# Patient Record
Sex: FEMALE | Race: WHITE | NOT HISPANIC OR LATINO | Employment: FULL TIME | URBAN - NONMETROPOLITAN AREA
[De-identification: names, ages, dates, MRNs, and addresses within clinical notes are randomized per-mention and may not be internally consistent; named-entity substitution may affect disease eponyms.]

---

## 2020-06-02 ENCOUNTER — TRANSCRIBE ORDERS (OUTPATIENT)
Dept: ADMINISTRATIVE | Facility: HOSPITAL | Age: 35
End: 2020-06-02

## 2020-06-02 DIAGNOSIS — Z01.818 PREOP TESTING: Primary | ICD-10-CM

## 2020-06-08 ENCOUNTER — LAB (OUTPATIENT)
Dept: LAB | Facility: HOSPITAL | Age: 35
End: 2020-06-08

## 2020-06-08 PROCEDURE — U0003 INFECTIOUS AGENT DETECTION BY NUCLEIC ACID (DNA OR RNA); SEVERE ACUTE RESPIRATORY SYNDROME CORONAVIRUS 2 (SARS-COV-2) (CORONAVIRUS DISEASE [COVID-19]), AMPLIFIED PROBE TECHNIQUE, MAKING USE OF HIGH THROUGHPUT TECHNOLOGIES AS DESCRIBED BY CMS-2020-01-R: HCPCS | Performed by: OTOLARYNGOLOGY

## 2020-06-09 LAB
COVID LABCORP PRIORITY: NORMAL
SARS-COV-2 RNA RESP QL NAA+PROBE: NOT DETECTED

## 2020-06-11 ENCOUNTER — OFFICE VISIT (OUTPATIENT)
Dept: OTOLARYNGOLOGY | Facility: CLINIC | Age: 35
End: 2020-06-11

## 2020-06-11 VITALS
TEMPERATURE: 98.1 F | HEART RATE: 101 BPM | WEIGHT: 134 LBS | SYSTOLIC BLOOD PRESSURE: 133 MMHG | DIASTOLIC BLOOD PRESSURE: 87 MMHG | HEIGHT: 66 IN | BODY MASS INDEX: 21.53 KG/M2

## 2020-06-11 DIAGNOSIS — J30.9 ALLERGIC RHINITIS, UNSPECIFIED SEASONALITY, UNSPECIFIED TRIGGER: ICD-10-CM

## 2020-06-11 DIAGNOSIS — K21.9 LARYNGOPHARYNGEAL REFLUX: ICD-10-CM

## 2020-06-11 DIAGNOSIS — J31.0 CHRONIC RHINITIS: ICD-10-CM

## 2020-06-11 DIAGNOSIS — J38.5 LARYNGEAL SPASM: ICD-10-CM

## 2020-06-11 DIAGNOSIS — R49.0 MUSCLE TENSION DYSPHONIA: ICD-10-CM

## 2020-06-11 DIAGNOSIS — J37.0 CHRONIC LARYNGITIS: Primary | ICD-10-CM

## 2020-06-11 PROCEDURE — 31575 DIAGNOSTIC LARYNGOSCOPY: CPT | Performed by: OTOLARYNGOLOGY

## 2020-06-11 PROCEDURE — 99204 OFFICE O/P NEW MOD 45 MIN: CPT | Performed by: OTOLARYNGOLOGY

## 2020-06-11 RX ORDER — CHOLESTYRAMINE 4 G/9G
4 POWDER, FOR SUSPENSION ORAL DAILY
COMMUNITY
End: 2021-06-17

## 2020-06-11 RX ORDER — FLUTICASONE PROPIONATE 50 MCG
2 SPRAY, SUSPENSION (ML) NASAL DAILY
Qty: 1 BOTTLE | Refills: 6 | Status: SHIPPED | OUTPATIENT
Start: 2020-06-11 | End: 2020-07-11

## 2020-06-11 RX ORDER — GUAIFENESIN 600 MG/1
1200 TABLET, EXTENDED RELEASE ORAL EVERY 12 HOURS SCHEDULED
Qty: 60 TABLET | Refills: 3 | Status: SHIPPED | OUTPATIENT
Start: 2020-06-11 | End: 2020-07-11

## 2020-06-11 RX ORDER — OMEPRAZOLE 40 MG/1
40 CAPSULE, DELAYED RELEASE ORAL DAILY
Qty: 30 CAPSULE | Refills: 3 | Status: SHIPPED | OUTPATIENT
Start: 2020-06-11 | End: 2020-07-11

## 2020-06-11 RX ORDER — ALPRAZOLAM 0.5 MG/1
0.05 TABLET ORAL 2 TIMES DAILY PRN
COMMUNITY
End: 2021-10-14

## 2020-06-11 NOTE — PROGRESS NOTES
Indra Rubio MD     Chief Complaint   Patient presents with   • Difficulty Swallowing     vocal cord dysfunction        HPI   I have reviewed the history as documented by the MA/REY/RN Indra Rubio MD  Mago Gamboa is a 34 y.o. female has had a chronic history of vocal cord dysfunction.  She has had an episode of severe throat pain like her larynx ripped when she was singing in the car.  This caused her to have chronic irritation.  She has seen several ENTs and has had speech therapy to try to improve her chronic laryngitis complaints.  She has seen a gastroenterologist who of performed EGD and ruled out King's esophagitis.  She has been on intermittent Valium to try to decrease vocal cord spasms.  She has not had recurring spasms.  She has not had ongoing hoarseness.  She does have a chronic globus sensation and a sensation that she is not swallowing well.  She has a good water intake and decreased caffeine intake.  She has had a lot of postnasal drip and chronic nasal congestion.    Review of Systems   I have reviewed the ROS as documented by the MA/REY/RN Indra Rubio MD      Past History:  Past Medical History:   Diagnosis Date   • Tachycardia      Past Surgical History:   Procedure Laterality Date   • ADENOIDECTOMY     • APPENDECTOMY     • CARDIAC SURGERY      loop recorder   • CHOLECYSTECTOMY     • ENDOMETRIAL ABLATION     • HYSTERECTOMY     • TONSILLECTOMY       History reviewed. No pertinent family history.  Social History     Tobacco Use   • Smoking status: Never Smoker   • Smokeless tobacco: Never Used   Substance Use Topics   • Alcohol use: Yes     Frequency: Monthly or less     Drinks per session: 1 or 2     Binge frequency: Never   • Drug use: Never     Outpatient Medications Marked as Taking for the 6/11/20 encounter (Office Visit) with Indra Rubio MD   Medication Sig Dispense Refill   • ALPRAZolam (XANAX) 0.5 MG tablet Take 0.05 mg by mouth 2 (Two) Times a Day  As Needed.     • cholestyramine (Questran) 4 g packet Take 4 g by mouth Daily.       Allergies:  Iodinated diagnostic agents and Sulfa antibiotics      Vital Signs:   Temp:  [98.1 °F (36.7 °C)] 98.1 °F (36.7 °C)  Heart Rate:  [101] 101  BP: (133)/(87) 133/87    Physical Exam  CONSTITUTIONAL: well nourished, well-developed, alert, oriented, in no acute distress   COMMUNICATION AND VOICE: able to communicate normally, normal voice quality  HEAD: normocephalic, no lesions, atraumatic, no tenderness, no masses   FACE: appearance normal, no lesions, no tenderness, no deformities, facial motion symmetric  SALIVARY GLANDS: parotid glands with no tenderness, no swelling, no masses, submandibular glands with normal size, nontender  EYES: ocular motility normal, eyelids normal, orbits normal, no proptosis, conjunctiva normal , pupils equal, round  HEARING: response to conversational voice normal bilaterally   EXTERNAL EARS: auricles without lesions  EXTERNAL EAR CANALS: normal ear canals without stenosis or significant cerumen  TYMPANIC MEMBRANES: tympanic membrane appearance normal, no lesions, no perforation, normal mobility, no fluid  EXTERNAL NOSE: structure normal, no tenderness on palpation, no nasal discharge, no lesions, no evidence of trauma, nostrils patent  INTRANASAL EXAM: nasal mucosa with mucosal congestion and erythema, nasal septum without overt anterior deviation  NASOPHARYNX: nasopharyngeal mucosa, adenoids within normal limits  LIPS: structure normal, no tenderness on palpation, no lesions, no evidence of trauma  TEETH: dentition within normal limits for age  GUMS: gingivae healthy  ORAL MUCOSA: oral mucosa normal, no mucosal lesions  FLOOR OF MOUTH: Warthin's duct patent, mucosa normal  TONGUE: lingual mucosa normal without lesions, normal tongue mobility  PALATE: soft and hard palates with normal mucosa and structure  OROPHARYNX: oropharyngeal mucosa normal, tonsil fossa normal in appearance  HYPOPHARYNX:  hypopharyngeal mucosa normal  LARYNX: diffuse inflammation and thick mucous present, epiglottis and arytenoid cartilage within normal limits, vocal cord with normal mobility   NECK: neck appearance normal, no masses or tenderness  THYROID: no overt thyromegaly, no tenderness, nodules or mass present on palpation, position midline   LYMPH NODES: no lymphadenopathy  CHEST/RESPIRATORY: respiratory effort normal, normal breath sounds  CARDIOVASCULAR: rate and rhythm normal, extremities without cyanosis or edema, no overt jugulovenous distension present  NEUROLOGIC/PSYCHIATRIC: oriented appropriately for age, mood normal, affect appropriate, cranial nerves intact grossly unless specifically mentioned above     Laryngoscopy  Date/Time: 6/11/2020 3:30 PM  Performed by: Indra Rubio MD  Authorized by: Indra Rubio MD   Comments: Procedure Note    Anesthesia: topical 4% tetracaine and oxymetazoline mix    Endoscopy Type: Flexible Laryngoscopy    Indications for Procedure: Patient unable to cooperate - preventing mirror examination  Hoarseness, dysphagia or aspiration - not able to be clearly evaluated by indirect laryngoscopy    Procedure Details:    The patient was placed in the sitting position.  The 4 mm laryngoscope was passed.  The nasal cavities, nasopharynx, oropharynx, hypopharynx, and larynx were all examined.  Vocal cords were examined during respiration and phonation.  The following findings were noted:    Findings: diffuse mucosal dryness and thick mucous present, diffuse mucosal inflammation present with posterior arytenoid edema and erythema present,    Condition:  Stable.  Patient tolerated procedure well.    Complications:  None            RESULTS REVIEW:    I have reviewed the patients old records in the chart.            Assessment:    1. Chronic laryngitis    2. Laryngopharyngeal reflux    3. Muscle tension dysphonia    4. Laryngeal spasm    5. Allergic rhinitis, unspecified seasonality,  unspecified trigger    6. Chronic rhinitis            Plan:    Patient Instructions    Call for problems, especially  hoarseness, voice change and trouble swallowing or noisy breathing.  Increase water/ non caffeinated drink intake to at least 6-8 glasses a day. Decrease caffeine intake. Plain Mucinex over the counter as needed to thin out secretions.  Limit voice use for 1 month. If talking is necessary, use a low volume voice rather than a whisper. Avoid coughing and throat clearing as much as possible.     New Medications Ordered This Visit   Medications   • guaiFENesin (MUCINEX) 600 MG 12 hr tablet     Sig: Take 2 tablets by mouth Every 12 (Twelve) Hours for 30 days.     Dispense:  60 tablet     Refill:  3   • omeprazole (priLOSEC) 40 MG capsule     Sig: Take 1 capsule by mouth Daily for 30 days. Take 40 mg by mouth 30 minute-1 hour before the last large meal you would eat before going to bed     Dispense:  30 capsule     Refill:  3   • fluticasone (Flonase) 50 MCG/ACT nasal spray     Si sprays into the nostril(s) as directed by provider Daily for 30 days. Administer 2 sprays in each nostril for each dose.     Dispense:  1 bottle     Refill:  6     Orders Placed This Encounter   Procedures   • Intradermal Allergy Testing   • Prick Testing (multi-Test)   • Laryngoscopy       Return in about 3 months (around 2020) for follow up with [facility].       Indra Rubio MD  20  15:33

## 2020-06-11 NOTE — PATIENT INSTRUCTIONS
Patient Instructions    Call for problems, especially  hoarseness, voice change and trouble swallowing or noisy breathing.  Increase water/ non caffeinated drink intake to at least 6-8 glasses a day. Decrease caffeine intake. Plain Mucinex over the counter as needed to thin out secretions.  Limit voice use for 1 month. If talking is necessary, use a low volume voice rather than a whisper. Avoid coughing and throat clearing as much as possible.      *IT IS THE RESPONSIBILITY OF THE PATIENT TO CONTACT YOUR INSURANCE COMPANY FOR INFORMATION REGARDING PRIOR AUTHORIZATION, PAYMENT PRACTICES, AND COVERAGES. *  (The following codes will help with questions when calling your insurance company)  05425 (Allergy Nurse/Tech Office Visit)  95004 x 23 units (Skin Prick Test)  95024 x 23 units (Intra-Dermal Test)   Diagnosis Code 477.9    INSTRUCTIONS FOR ALLERGY TESTING    If you take Beta Blockers (see attached list) you will not be able to have testing or any type of immunotherapy unless otherwise discussed with your prescribing physician and the physician ordering allergy testing.  Please discuss this with us beforehand.     We would like to review the follow rules for testing and instructions regarding any medications you may be taking.  Some medications are contraindicated when being tested and could potentially affect the results or pose an adverse effect.     *Please arrive 15 minutes prior to your scheduled testing appointment to fill out the required paper work relating to the testing.  If you are more than 15 minutes late for your scheduled testing time, you will be cancelled and rescheduled.  *Please wear a short sleeve shirt and no perfume, lotions, or cologne.  *Only the patient being testing will be allowed back to the testing area.  If the patient is under the age of 18, they must be accompanied by an adult.    Please do not take: Clarinex (Loratadine) , Claritin or Claritin-D for 7 days prior to testing.  Please  do not take these medications 7 days prior to testing:   *Antihistamines or anything containing one (see attached list)   *Muscle relaxers or tranquilizers   * Antidepressants (see attached list. Check with you prescribing                     physicians before stopping any medications such as these.)   *Sedatives   *Nasal Sprays (those containing Antihistamine: Please check list)   * Excedrin PM, or Rosa-Naknek   *Any supplement containing Vitamin C     Medications you may continue taking up until the time of testing:              *Asthma medications (try to avoid for 6 hours prior to testing)   *Tylenol (regular or extra strength)   *Birth Control Pills or Hormones   *Diuretics (water pills)   *Plain Decongestants (those without Antihistamines)   *Sinus Rinse     The testing will take approximately 1.5 to 2 hours.  Please eat before you come for testing. There is no fasting required.  If it is necessary to cancel your scheduled testing appointment, please do so within 24 hours at the phone numbers provided.   If we are not notified within this time period, there will be a $50.00 missed appointment charge.     Beta Blockers  If you are on a beta blocker medication, you will need to talk with your primary care physician about switching to another medication 6 weeks prior to testing or allergy immunotherapy.    Beta blockers are used to treat high blood pressure, heart disease, and headaches and are often used to treat glaucoma.  Beta blockers make it more difficult to reverse a systemic reaction to allergy injection and testing.  We do not test nor treat patients on beta blockers for this reason.    If you are scheduled for allergy testing or treatment and you have a change in medications, it is important that you inform the nurse prior to your appointment. Do not discontinue the use of your beta blocker medication on your own.  Your prescribing physician can evaluate your need for the beta blocker and can safely  discontinue your beta blocker or switch you to a different type of medication.  We require a written letter from your prescribing physician on his/her decision to discontinue your beta blocker before testing or treatment will be considered.     BETA BLOCKER MEDICATION LIST                      Brand Name                      Generic Name  Betapace Sotalol   Blocadren Timolol   Cartrol Carteolol   Coreg Carvedilol   Corgard Nadolol   Corzide Nadol/Bendroflunetazide   Inderal Propranolol   Inderide Propranolol/HCTZ   Kerlone Betaxolol   Lopressor Metoprolol   Normodyne Labetalol   Sectral Acebutolol   Tenoretic Atenolol/HCTZ   Tenormin Atenolol   Timolide Timolol/HCTZ   Toprol Metoprolol   Visken Pindolol   Zebeta Bisoprolol   Ziac Bisoprolol/HCTZ     EYE DROPS CONTAINING BETA BLOCKERS  Betagan Levobunolol   AK Beta Levobunolol   Betoptic Betaxolol   Optipranolol Metipranolol   Ocupress Carteolol   Timoptic Nadol/Bendroflunetazide         Antidepressants  The following antidepressants may sometimes affect the testing results and it is recommended that you discontinue those 3-4 days before testing:                              Brand Name                                                             Generic Name  Elavil Amitriptyline   Asendin Amoxapine   Anafranil Clomipramine   Norpramin Desipramine   Sinequan Doxepin   Tofranil Imipramine   Pamelor Nortriptyline   Vivactil Protriptyline   Surmontil Trimipramine   Maprotiline None Available   Remeron Mirtazapine     The following antidepressants have little or no affect on testing and you should be able to continue taking them up until the time of testing:  Desyrel Trazadone   Wellbutrin Bupropion   Effexor Venlafaxine   Serzone Nefazodone   Celexa Citalopram   Prozac Fluoxetine   Luvox Fluvoxamine   Paxil Paroxetine   Zoloft Sertraline   Nardil Phenelzine   Parnate Tranylcypromine     Antihistamines  Antihistamines should be discontinued 7 days prior to allergy  testing.  The more common antihistamines are listed below.  This is not a complete list of all medications containing antihistamines.  Many over-the-counter medications contain antihistamines.  If you are uncertain as to if the medication you are taking contains an antihistamine, please check with your physician or pharmacist.  Name Brand Antihistamines  Allegra/Allegra D Extendryl Rynatan Tylenol PM   Atarax Hycomine Compound Rynatuss Vistaril   Atrohist Kronofed Semprex Xyzal   Benedryl Nolamine Sinulin Zyrtec   Bromfed Nolohist Tavist/ Tavist D Midol PM   Clarinex Pataday Trinalin Ritalin   Claritin Patanase Tussionex Tagamet   Codimal DH Syrup Patanol Tylenol Allergy Zantac   Dimetane Periactin Tylenol Cold Antivert   Dura-Vent Phenergan Tylenol Flu Astelin/Astepro     Generic Antihistamines  Acrivastine Astemizole Meclizine   Azatadine Azelastine    Brompheniramine Cetirizine    Chlorpheniramine Cyproheptadine    Diphehydramine Fexofenadine    Hydroxyzine Loratidine    Methscopolamine Phenidamine    Promethazine Pyrilamine

## 2020-06-11 NOTE — PROGRESS NOTES
Fanny Langston LPN   Patient Intake Note  Mago Gamboa is a  34 y.o. female who complains of voice change and dysphagia. The symptoms are localized to the throat. The patient has had mild to moderate symptoms. The symptoms have been relatively constant for the last several years. The symptoms are aggravated by: no identifiable factors. The patient  reports that she has never smoked. She has never used smokeless tobacco.. She drinks 8 non caffeinated drinks a day and 3-4 caffeinated drinks a day. She has had a history of acid reflux symptoms. She is not being treated for acid reflux. The patient has been taking: none       Review of Systems  Review of Systems   Constitutional: Negative for chills and fever.   HENT: Positive for trouble swallowing and voice change.    Eyes: Negative for pain and redness.   Respiratory: Positive for shortness of breath. Negative for cough and choking.    Gastrointestinal: Negative for diarrhea, nausea and vomiting.   Musculoskeletal: Negative for neck pain and neck stiffness.   Neurological: Positive for headaches. Negative for dizziness and light-headedness.   Hematological: Does not bruise/bleed easily.   Psychiatric/Behavioral: Positive for sleep disturbance (wakes up gasping 2-3 times a week).   All other systems reviewed and are negative.      Tobacco Use: Screening and Cessation Intervention  Social History    Tobacco Use      Smoking status: Never Smoker      Smokeless tobacco: Never Used        Fanny Langston LPN  6/11/2020  14:46

## 2021-03-01 NOTE — PROGRESS NOTES
Chief Complaint   Patient presents with   • GI Problem     was ah[facility]erd in december got really sick went to [place] in [place] up       PCP: Maren Hsieh APRN  REFER: Maren Hsieh APRN    Subjective     HPI    Mago Gamboa presents to office with greater than 5 years difficulty with intermittent dysphagia, feelings of acid reflux, and nausea/vomitting.  No aggravating factors.  No abdominal pain.  Dec 2020 she developed worsening nausea/vomitting.  zofran provides relief.  No hematemesis.  No bright red blood per rectum, no melena.  She reports weight loss of 15-20 lbs with onset of nausea/vomitting.  Today she states her main complaint is her nausea/vomitting.   No ppi or H2 Blocker due to no improvement in symptoms in past.  She has undergone multiple EGD in past.  She has had negative Bravo, negative motility study in past.  She has undergone negative ENT evaluation.    She has been evaluated by ST whom has recommended physical therapy (appointment today) for evaluation of vagus nerve (per patient, no record to review).      Past Medical History:   Diagnosis Date   • Tachycardia        Past Surgical History:   Procedure Laterality Date   • ADENOIDECTOMY     • APPENDECTOMY     • CARDIAC SURGERY      loop recorder   • CHOLECYSTECTOMY     • ENDOMETRIAL ABLATION     • HYSTERECTOMY     • TONSILLECTOMY         Outpatient Medications Marked as Taking for the 3/3/21 encounter (Office Visit) with Kristopher Gandhi APRN   Medication Sig Dispense Refill   • ALPRAZolam (XANAX) 0.5 MG tablet Take 0.05 mg by mouth 2 (Two) Times a Day As Needed.     • ondansetron (ZOFRAN) 8 MG tablet Take  by mouth Every 8 (Eight) Hours As Needed for Nausea or Vomiting.         Allergies   Allergen Reactions   • Iodinated Diagnostic Agents Anaphylaxis   • Sulfa Antibiotics Angioedema, Other (See Comments) and Rash     breathing difficulty         Social History     Socioeconomic History   • Marital status:       "Spouse name: Not on file   • Number of children: Not on file   • Years of education: Not on file   • Highest education level: Not on file   Tobacco Use   • Smoking status: Never Smoker   • Smokeless tobacco: Never Used   Substance and Sexual Activity   • Alcohol use: Yes     Frequency: Monthly or less     Drinks per session: 1 or 2     Binge frequency: Never     Comment: rare   • Drug use: Never       Review of Systems   Constitutional: Positive for unexpected weight change.   Respiratory: Negative for shortness of breath.    Cardiovascular: Negative for chest pain.   Gastrointestinal: Positive for nausea and vomiting. Negative for abdominal pain and anal bleeding.       Objective     Vitals:    03/03/21 0833   BP: 130/84   Pulse: 105   Temp: 98.2 °F (36.8 °C)   SpO2: 99%   Weight: 57.2 kg (126 lb)   Height: 167.6 cm (66\")     Body mass index is 20.34 kg/m².    Physical Exam  Constitutional:       Appearance: Normal appearance. She is well-developed.   Eyes:      General: No scleral icterus.  Cardiovascular:      Rate and Rhythm: Regular rhythm.      Heart sounds: Normal heart sounds. No murmur.   Pulmonary:      Effort: Pulmonary effort is normal. No accessory muscle usage.      Breath sounds: Normal breath sounds.   Abdominal:      General: Bowel sounds are normal. There is no distension.      Palpations: Abdomen is soft. There is no mass.      Tenderness: There is no abdominal tenderness. There is no guarding or rebound.   Skin:     General: Skin is warm and dry.      Coloration: Skin is not jaundiced.   Neurological:      Mental Status: She is alert.   Psychiatric:         Behavior: Behavior is cooperative.         Imaging Results (Most Recent)     None          Body mass index is 20.34 kg/m².    Assessment/Plan     Diagnoses and all orders for this visit:    1. Nausea and vomiting, intractability of vomiting not specified, unspecified vomiting type (Primary)    2. Gastroesophageal reflux disease, unspecified " whether esophagitis present        * Surgery not found *     suggested EGD for further evaluation of nausea/vomitting.  Patient declined stating she has already had multiple EGD in past.    In regards to her periodic heartburn/dysphagia I recommend proceeding with physical therapy as recommended by ST.  If physical therapy does not provide relief further GI workup could be considered      Kristopher Gandhi, APRN  03/03/21          There are no Patient Instructions on file for this visit.

## 2021-03-03 ENCOUNTER — OFFICE VISIT (OUTPATIENT)
Dept: GASTROENTEROLOGY | Facility: CLINIC | Age: 36
End: 2021-03-03

## 2021-03-03 VITALS
HEIGHT: 66 IN | BODY MASS INDEX: 20.25 KG/M2 | OXYGEN SATURATION: 99 % | HEART RATE: 105 BPM | WEIGHT: 126 LBS | TEMPERATURE: 98.2 F | SYSTOLIC BLOOD PRESSURE: 130 MMHG | DIASTOLIC BLOOD PRESSURE: 84 MMHG

## 2021-03-03 DIAGNOSIS — R11.2 NAUSEA AND VOMITING, INTRACTABILITY OF VOMITING NOT SPECIFIED, UNSPECIFIED VOMITING TYPE: Primary | ICD-10-CM

## 2021-03-03 DIAGNOSIS — K21.9 GASTROESOPHAGEAL REFLUX DISEASE, UNSPECIFIED WHETHER ESOPHAGITIS PRESENT: ICD-10-CM

## 2021-03-03 PROCEDURE — 99203 OFFICE O/P NEW LOW 30 MIN: CPT | Performed by: NURSE PRACTITIONER

## 2021-03-03 RX ORDER — ONDANSETRON HYDROCHLORIDE 8 MG/1
TABLET, FILM COATED ORAL EVERY 8 HOURS PRN
COMMUNITY
End: 2021-09-03 | Stop reason: SDUPTHER

## 2021-06-15 PROBLEM — R11.2 INTRACTABLE NAUSEA AND VOMITING: Chronic | Status: ACTIVE | Noted: 2021-06-15

## 2021-06-15 PROBLEM — R09.89 GLOBUS SENSATION: Status: ACTIVE | Noted: 2021-06-15

## 2021-06-15 PROBLEM — R11.2 INTRACTABLE NAUSEA AND VOMITING: Status: ACTIVE | Noted: 2021-06-15

## 2021-06-15 PROBLEM — R09.A2 GLOBUS SENSATION: Chronic | Status: ACTIVE | Noted: 2021-06-15

## 2021-06-15 PROBLEM — J45.20 MILD INTERMITTENT ASTHMA WITHOUT COMPLICATION: Status: ACTIVE | Noted: 2021-06-15

## 2021-06-15 PROBLEM — R09.A2 GLOBUS SENSATION: Status: ACTIVE | Noted: 2021-06-15

## 2021-06-15 PROBLEM — J45.20 MILD INTERMITTENT ASTHMA WITHOUT COMPLICATION: Chronic | Status: ACTIVE | Noted: 2021-06-15

## 2021-06-15 PROBLEM — R09.89 GLOBUS SENSATION: Chronic | Status: ACTIVE | Noted: 2021-06-15

## 2021-06-15 NOTE — PROGRESS NOTES
Chief Complaint  Asthma and Shortness of Breath (started 5 years ago, been to speech, cardio, GI to see if those were the issues)    Subjective    History of Present Illness {CC  Problem List  Visit Diagnosis   Encounters  Notes  Medications  Labs  Result Review Imaging  Media     Mago Gamboa presents to [facility]ULMON[facility]IC[facility] for:    Management of shortness of breath and has been present for 5 years.  She has a lifelong history of asthma although it has been well controlled for many years.  She has never seen a pulmonologist.  She did have PFTs approximately 2 to 3 years ago at [facility].  She indicates her symptoms began very suddenly while riding in her car singing out loud 5 years ago.  She experienced a very severe pain in her right side of her throat followed by trouble swallowing and shortness of breath.  She is seen multiple providers throughout multiple specialties and multiple areas and states over the last 5 years.  She reports nothing is been beneficial.  She denies any family history to speak of.  No occupational risk factors.  She does have dogs in the home which she has had for over 13 years.  She reports she has tried Spiriva, Combivent, proair and Advair, none were beneficial.  She has had multiple EGDs, H. pylori testing, pH testing and a swallow study.  She reports all of these to be negative.  She reports she had a endoscopy a few years ago and was told she had ulcers.  She was treated for this and referred to a specialist in [place].  She reports she was told by them that she did not have ulcers and he thought she may have vocal cord dysfunction and recommended she see ENT.  She has since relocated from [facility] to our area.  It appears she was evaluated by ENT last year.  They did not report any vocal cord dysfunction although they did report some muscle dystonia and laryngeal spasms.  They also indicated notation of reflux and  rhinitis.  They recommended Mucinex, fluticasone and an H2 blocker.  She reports she has been off and on reflux therapy throughout the 5 years however none of it helped.  It appears she has tried Protonix, Carafate, Dexilant, Prilosec and Pepcid.  I also recommended allergy testing which she did not complete.  She was referred to speech for treatment of the vocal cord dysfunction.  She thinks that that may have helped some.  Speech recommended physical therapy.  She is still participating in physical therapy every 2 weeks.  She does not think that is benefiting her at all.  She has had her thyroid checked.  She has had a loop recorder in for 1.5 years from a cardiologist in [place].  She indicates she will be following with them ongoing.  She was diagnosed with Covid in December.  She reports her symptoms were worse during that time however they have returned to her baseline chronic state recently.  She reports over the last month waking up out of sleep with shortness of breath.  She has daytime symptoms that occur with no triggers other than they do occur sometimes with eating.  She has a rescue inhaler and a nebulizer available but has not tried that during these episodes.  She indicates she has gotten a worse over the last 5 years however she has not gotten any better either.  She indicates her shortness of breath specifically with inspiration resulting in pain in her throat and tightness in her chest.  No issues with expiration.  It is not worsened by activity       Prior to Admission medications    Medication Sig Start Date End Date Taking? Authorizing Provider   ALPRAZolam (XANAX) 0.5 MG tablet Take 0.05 mg by mouth 2 (Two) Times a Day As Needed.    ProviderAlbaro MD   cholestyramine (Questran) 4 g packet Take 4 g by mouth Daily.    Albaro Kenyon MD   diazePAM (VALIUM) 1 MG/ML solution Take 10 mL by mouth As Needed.    Albaro Kenyon MD   ondansetron (ZOFRAN) 8 MG tablet Take  by mouth  "Every 8 (Eight) Hours As Needed for Nausea or Vomiting.    Provider, Albaro, MD       Social History     Socioeconomic History   • Marital status:      Spouse name: Not on file   • Number of children: Not on file   • Years of education: Not on file   • Highest education level: Not on file   Tobacco Use   • Smoking status: Never Smoker   • Smokeless tobacco: Never Used   Substance and Sexual Activity   • Alcohol use: Yes     Comment: rare   • Drug use: Never   • Sexual activity: Yes     Partners: Male     Birth control/protection: Surgical       Objective   Vital Signs:   /78   Pulse 111   Ht 167.6 cm (66\")   Wt 57.2 kg (126 lb)   SpO2 99%   BMI 20.34 kg/m²     Physical Exam  Constitutional:       Interventions: Face mask in place.   Cardiovascular:      Rate and Rhythm: Normal rate and regular rhythm.      Heart sounds: No murmur heard.     Pulmonary:      Effort: Pulmonary effort is normal.      Breath sounds: Normal breath sounds.   Musculoskeletal:      Right lower leg: No edema.      Left lower leg: No edema.   Neurological:      Mental Status: She is alert and oriented to person, place, and time.        Result Review :{ Labs  Result Review  Imaging  Med Tab  Media :      No PFTs for this visit    No results found for this or any previous visit.      My interpretation of imaging: None for this visit    My interpretation of labs: None for this visit      Assessment and Plan {CC Problem List  Visit Diagnosis  ROS  Review (Popup)  Health Maintenance  Quality  BestPractice  Medications  SmartSets  SnapShot Encounters  Media      Diagnoses and all orders for this visit:    1. Mild intermittent asthma without complication (Primary)  Comments:  Recommend using albuterol during episodes of shortness of breath.  If it helps I recommended maintenance inhaler.  Will order updated PFTs as well    2. Globus sensation  Comments:  Ongoing speech and physical therapy treatments.  " Defer    3. Intractable nausea and vomiting  Comments:  GI recommended an EGD.  I recommend she pursue this.  She initially declined    4. Shortness of breath  Comments:  Chronic.  Uncertain of relation to vocal cord/swallowing issues.  Will order overnight oximetry since she is having nocturnal symptoms and PFTs  Orders:  -     Full Pulmonary Function Test With Bronchodilator; Future  -     Overnight Sleep Oximetry Study; Future        Patient's Body mass index is 20.34 kg/m². indicating that she is within normal range (BMI 18.5-24.9). No BMI management plan needed..      Have asked patient to sign records release to allow us to get PFTs and records from previous treating providers in [facility] and [place].  We also discussed possible referral to the voice center at [place].  Reassessment after tests completed and records reviewed.    Princess Rico, MCKAYLA  6/17/2021  16:19 CDT    Follow Up {Instructions Charge Capture  Follow-up Communications   Return in about 3 months (around 9/17/2021).    Patient was given instructions and counseling regarding her condition or for health maintenance advice. Please see specific information pulled into the AVS if appropriate.

## 2021-06-17 ENCOUNTER — OFFICE VISIT (OUTPATIENT)
Dept: PULMONOLOGY | Facility: CLINIC | Age: 36
End: 2021-06-17

## 2021-06-17 VITALS
HEART RATE: 111 BPM | SYSTOLIC BLOOD PRESSURE: 124 MMHG | HEIGHT: 66 IN | DIASTOLIC BLOOD PRESSURE: 78 MMHG | OXYGEN SATURATION: 99 % | BODY MASS INDEX: 20.25 KG/M2 | WEIGHT: 126 LBS

## 2021-06-17 DIAGNOSIS — J45.20 MILD INTERMITTENT ASTHMA WITHOUT COMPLICATION: Primary | Chronic | ICD-10-CM

## 2021-06-17 DIAGNOSIS — R09.89 GLOBUS SENSATION: Chronic | ICD-10-CM

## 2021-06-17 DIAGNOSIS — R06.02 SHORTNESS OF BREATH: ICD-10-CM

## 2021-06-17 DIAGNOSIS — R11.2 INTRACTABLE NAUSEA AND VOMITING: Chronic | ICD-10-CM

## 2021-06-17 PROCEDURE — 99214 OFFICE O/P EST MOD 30 MIN: CPT | Performed by: NURSE PRACTITIONER

## 2021-06-17 RX ORDER — ALBUTEROL SULFATE 90 UG/1
2 AEROSOL, METERED RESPIRATORY (INHALATION) EVERY 4 HOURS PRN
COMMUNITY
End: 2021-09-03 | Stop reason: SDUPTHER

## 2021-06-17 RX ORDER — ONDANSETRON 8 MG/1
TABLET, ORALLY DISINTEGRATING ORAL
COMMUNITY
Start: 2021-05-20 | End: 2021-12-02 | Stop reason: SDUPTHER

## 2021-06-17 RX ORDER — ALBUTEROL SULFATE 2.5 MG/3ML
SOLUTION RESPIRATORY (INHALATION)
COMMUNITY

## 2021-06-17 RX ORDER — MULTIPLE VITAMINS W/ MINERALS TAB 9MG-400MCG
1 TAB ORAL DAILY
COMMUNITY
End: 2021-11-30 | Stop reason: ALTCHOICE

## 2021-06-22 DIAGNOSIS — R06.02 SHORTNESS OF BREATH: ICD-10-CM

## 2021-07-07 ENCOUNTER — TELEPHONE (OUTPATIENT)
Dept: GASTROENTEROLOGY | Facility: CLINIC | Age: 36
End: 2021-07-07

## 2021-07-07 NOTE — TELEPHONE ENCOUNTER
03/03/2021 OV with Kristopher suggested EGD - patient declined at that time. Patient called back today and schedule EGD with Dr. Pope for 07/26/2021 at 6:30am     Need case request.     TY

## 2021-07-16 DIAGNOSIS — K21.9 GASTROESOPHAGEAL REFLUX DISEASE, UNSPECIFIED WHETHER ESOPHAGITIS PRESENT: Primary | ICD-10-CM

## 2021-07-19 ENCOUNTER — TRANSCRIBE ORDERS (OUTPATIENT)
Dept: LAB | Facility: HOSPITAL | Age: 36
End: 2021-07-19

## 2021-07-19 DIAGNOSIS — Z01.818 PREOPERATIVE TESTING: Primary | ICD-10-CM

## 2021-07-22 ENCOUNTER — OFFICE VISIT (OUTPATIENT)
Dept: INTERNAL MEDICINE | Facility: CLINIC | Age: 36
End: 2021-07-22

## 2021-07-22 VITALS
WEIGHT: 126 LBS | SYSTOLIC BLOOD PRESSURE: 129 MMHG | OXYGEN SATURATION: 99 % | BODY MASS INDEX: 20.25 KG/M2 | HEIGHT: 66 IN | RESPIRATION RATE: 18 BRPM | TEMPERATURE: 97.8 F | HEART RATE: 70 BPM | DIASTOLIC BLOOD PRESSURE: 76 MMHG

## 2021-07-22 DIAGNOSIS — R11.2 INTRACTABLE VOMITING WITH NAUSEA, UNSPECIFIED VOMITING TYPE: Primary | ICD-10-CM

## 2021-07-22 PROBLEM — R07.0 THROAT PAIN IN ADULT: Status: ACTIVE | Noted: 2017-06-06

## 2021-07-22 PROBLEM — R06.00 DYSPNEA: Status: ACTIVE | Noted: 2021-07-22

## 2021-07-22 PROBLEM — J38.3 VOCAL CORD DYSFUNCTION: Status: ACTIVE | Noted: 2020-05-04

## 2021-07-22 PROBLEM — N60.09 CYST OF BREAST: Status: ACTIVE | Noted: 2021-07-22

## 2021-07-22 PROBLEM — R49.0 HOARSE: Status: ACTIVE | Noted: 2021-07-22

## 2021-07-22 PROBLEM — G47.00 INSOMNIA: Status: ACTIVE | Noted: 2021-07-22

## 2021-07-22 PROBLEM — F32.A DEPRESSIVE DISORDER: Status: ACTIVE | Noted: 2021-07-22

## 2021-07-22 PROBLEM — J45.909 ASTHMA: Status: ACTIVE | Noted: 2017-07-20

## 2021-07-22 PROBLEM — M19.90 OSTEOARTHRITIS: Status: ACTIVE | Noted: 2021-07-22

## 2021-07-22 PROCEDURE — 99203 OFFICE O/P NEW LOW 30 MIN: CPT | Performed by: NURSE PRACTITIONER

## 2021-07-22 RX ORDER — CHOLESTYRAMINE 4 G/9G
1 POWDER, FOR SUSPENSION ORAL 3 TIMES DAILY
COMMUNITY
End: 2021-09-17

## 2021-07-22 NOTE — PROGRESS NOTES
Subjective     Chief Complaint   Patient presents with   • Anxiety       History of Present Illness  Pt comes in today to establish care. She carries a history of asthma, recurrent nausea/vomiting, allergic rhinitis. She has been having recurrent vomiting with associated weight loss. She has had multiple studies including esophageal manometry, Bravo capsule, EGD's. She is scheduled for EGD next week with Dr. Pope. She continues with epigastric pain. She is using Zofran in order to function. The nausea is worse in the am. She has not had a CT of the head or gastric emptying scan. She has already had COVID in December. States after she recovered from COVID, she started having nausea and vomiting. She does have a loop recorder for tachycardia.     Anxiety  She has had anxiety for years. She has taken Xanax for years. States she has agoraphobia. She last filled it 11/2020.     Review of Systems   Constitutional: Negative for fatigue.   Eyes: Negative for visual disturbance.   Respiratory: Negative for cough and shortness of breath.    Cardiovascular: Positive for palpitations. Negative for chest pain.   Gastrointestinal: Positive for constipation ( uses miralax).   Endocrine: Negative for polydipsia, polyphagia and polyuria.   Genitourinary: Negative for enuresis and frequency.   Musculoskeletal: Negative for joint swelling and neck pain.   Skin: Negative.    Neurological: Negative for seizures, light-headedness and headaches.   Hematological: Does not bruise/bleed easily.   Psychiatric/Behavioral: The patient is nervous/anxious.       Otherwise complete ROS reviewed and negative except as mentioned in the HPI.     Past Medical History:   Past Medical History:   Diagnosis Date   • Allergic 2005   • Anxiety 2010   • Asthma, extrinsic    • GERD (gastroesophageal reflux disease)    • Globus sensation 6/15/2021   • Headache 2002   • Intractable nausea and vomiting 6/15/2021   • Mild intermittent asthma without  complication 6/15/2021   • Tachycardia      Past Surgical History:  Past Surgical History:   Procedure Laterality Date   • ADENOIDECTOMY     • APPENDECTOMY     • CARDIAC SURGERY      loop recorder   • CHOLECYSTECTOMY     • COLONOSCOPY  2015   • ENDOMETRIAL ABLATION     • HYSTERECTOMY     • SUBTOTAL HYSTERECTOMY  2016   • TONSILLECTOMY       Social History:  reports that she has never smoked. She has never used smokeless tobacco. She reports current alcohol use. She reports that she does not use drugs.    Family History: family history includes COPD in her maternal grandmother; Cancer in her maternal grandfather; Heart disease in her maternal grandmother; Stroke in her maternal grandfather. She was adopted.       Allergies:  Allergies   Allergen Reactions   • Compazine [Prochlorperazine] Palpitations   • Iodinated Diagnostic Agents Anaphylaxis   • Sulfa Antibiotics Angioedema, Other (See Comments) and Rash     breathing difficulty       Medications:  Prior to Admission medications    Medication Sig Start Date End Date Taking? Authorizing Provider   albuterol (PROVENTIL) (2.5 MG/3ML) 0.083% nebulizer solution INHALE 3 ML PO Q 4 - 6 H PRM    Albaro Kenyon MD   albuterol sulfate  (90 Base) MCG/ACT inhaler Inhale 2 puffs Every 4 (Four) Hours As Needed for Wheezing.    Albaro Kenyon MD   ALPRAZolam (XANAX) 0.5 MG tablet Take 0.05 mg by mouth 2 (Two) Times a Day As Needed.    Albaro Kenyon MD   cholestyramine (QUESTRAN) 4 g packet Take 1 packet by mouth 3 (Three) Times a Day.    Albaro Kenyon MD   multivitamin with minerals tablet tablet Take 1 tablet by mouth Daily.    Albaro Kenyon MD   ondansetron (ZOFRAN) 8 MG tablet Take  by mouth Every 8 (Eight) Hours As Needed for Nausea or Vomiting.    Albaro Kenyon MD   ondansetron ODT (ZOFRAN-ODT) 8 MG disintegrating tablet Place 1 tablet every 8 hours by translingual route as needed. 5/20/21   Albaro Kenyon MD  "      Objective     Vital Signs: /76   Pulse 70   Temp 97.8 °F (36.6 °C) (Skin)   Resp 18   Ht 167.6 cm (66\")   Wt 57.2 kg (126 lb)   SpO2 99%   BMI 20.34 kg/m²   Physical Exam  Vitals reviewed.   Constitutional:       Appearance: She is well-developed.   HENT:      Head: Normocephalic and atraumatic.   Eyes:      Pupils: Pupils are equal, round, and reactive to light.   Neck:      Vascular: No JVD.   Cardiovascular:      Rate and Rhythm: Normal rate. Rhythm irregular.   Pulmonary:      Effort: Pulmonary effort is normal.      Breath sounds: Normal breath sounds.   Abdominal:      General: Bowel sounds are normal.      Palpations: Abdomen is soft.   Musculoskeletal:         General: No deformity.      Cervical back: Normal range of motion and neck supple.   Lymphadenopathy:      Cervical: No cervical adenopathy.   Skin:     General: Skin is warm and dry.   Neurological:      Mental Status: She is alert and oriented to person, place, and time.   Psychiatric:         Behavior: Behavior normal.         Thought Content: Thought content normal.         Judgment: Judgment normal.       Patient's Body mass index is 20.34 kg/m². indicating that she is within normal range (BMI 18.5-24.9). No BMI management plan needed..    Results Reviewed:  WBC   Date Value Ref Range Status   05/24/2018 5.3 4.0 - 10.5 10*3/uL Final     Hematocrit   Date Value Ref Range Status   05/24/2018 40.0 37.0 - 47.0 % Final     Platelets   Date Value Ref Range Status   05/24/2018 295 150 - 450 10*3/uL Final         Assessment / Plan     Assessment/Plan:  Diagnoses and all orders for this visit:    1. Intractable vomiting with nausea, unspecified vomiting type (Primary)  -     CBC w AUTO Differential  -     Comprehensive Metabolic Panel  -     Sedimentation Rate  -     Amylase  -     TSH  -     T4, free      Return in about 3 months (around 10/22/2021). unless patient needs to be seen sooner or acute issues arise.    Code Status: Full. "     I have discussed the patient results/orders and and plan/recommendation with them at today's visit.      Jesusita Patterson, APRN   07/22/2021

## 2021-07-23 ENCOUNTER — PATIENT ROUNDING (BHMG ONLY) (OUTPATIENT)
Dept: INTERNAL MEDICINE | Facility: CLINIC | Age: 36
End: 2021-07-23

## 2021-07-23 ENCOUNTER — LAB (OUTPATIENT)
Dept: LAB | Facility: HOSPITAL | Age: 36
End: 2021-07-23

## 2021-07-23 LAB — SARS-COV-2 ORF1AB RESP QL NAA+PROBE: NOT DETECTED

## 2021-07-23 PROCEDURE — U0004 COV-19 TEST NON-CDC HGH THRU: HCPCS | Performed by: INTERNAL MEDICINE

## 2021-07-23 PROCEDURE — C9803 HOPD COVID-19 SPEC COLLECT: HCPCS | Performed by: INTERNAL MEDICINE

## 2021-07-23 NOTE — PROGRESS NOTES
"July 23, 2021    Hello, may I speak with Mago Gamboa?    My name is Huong Borrgeo      I am  with [facility]  [address] [place] [address] [phone].    Before we get started may I verify your date of birth? 1985    I am calling to officially welcome you to our practice and ask about your recent visit. Is this a good time to talk? yes    Tell me about your visit with us. What things went well?  \"It went very well. I use [facility] for everything and now we have gotten my primary care switched over, everything is now with you guys. I really liked the physician I saw. She was very knowledgable and seemed like she really cared.\"        We're always looking for ways to make our patients' experiences even better. Do you have recommendations on ways we may improve?  no    Overall were you satisfied with your first visit to our practice? yes       I appreciate you taking the time to speak with me today. Is there anything else I can do for you? \"Yes - I am needing my medical records from my previous physician of 20+ years.\" - I mailed her the release form with return postage.      Thank you, and have a great day.      "

## 2021-07-24 LAB
ALBUMIN SERPL-MCNC: 4.9 G/DL (ref 3.8–4.8)
ALBUMIN/GLOB SERPL: 2 {RATIO} (ref 1.2–2.2)
ALP SERPL-CCNC: 52 IU/L (ref 48–121)
ALT SERPL-CCNC: 6 IU/L (ref 0–32)
AMYLASE SERPL-CCNC: 96 U/L (ref 31–110)
AST SERPL-CCNC: 8 IU/L (ref 0–40)
BASOPHILS # BLD AUTO: 0 X10E3/UL (ref 0–0.2)
BASOPHILS NFR BLD AUTO: 1 %
BILIRUB SERPL-MCNC: 0.7 MG/DL (ref 0–1.2)
BUN SERPL-MCNC: 6 MG/DL (ref 6–20)
BUN/CREAT SERPL: 8 (ref 9–23)
CALCIUM SERPL-MCNC: 9.6 MG/DL (ref 8.7–10.2)
CHLORIDE SERPL-SCNC: 102 MMOL/L (ref 96–106)
CO2 SERPL-SCNC: 24 MMOL/L (ref 20–29)
CREAT SERPL-MCNC: 0.76 MG/DL (ref 0.57–1)
EOSINOPHIL # BLD AUTO: 0.1 X10E3/UL (ref 0–0.4)
EOSINOPHIL NFR BLD AUTO: 3 %
ERYTHROCYTE [DISTWIDTH] IN BLOOD BY AUTOMATED COUNT: 12.4 % (ref 11.7–15.4)
ERYTHROCYTE [SEDIMENTATION RATE] IN BLOOD BY WESTERGREN METHOD: NORMAL MM/HR
GLOBULIN SER CALC-MCNC: 2.4 G/DL (ref 1.5–4.5)
GLUCOSE SERPL-MCNC: 96 MG/DL (ref 65–99)
HCT VFR BLD AUTO: 43.2 % (ref 34–46.6)
HGB BLD-MCNC: 13.5 G/DL (ref 11.1–15.9)
IMM GRANULOCYTES # BLD AUTO: 0 X10E3/UL (ref 0–0.1)
IMM GRANULOCYTES NFR BLD AUTO: 0 %
LYMPHOCYTES # BLD AUTO: 1.4 X10E3/UL (ref 0.7–3.1)
LYMPHOCYTES NFR BLD AUTO: 30 %
MCH RBC QN AUTO: 29.3 PG (ref 26.6–33)
MCHC RBC AUTO-ENTMCNC: 31.3 G/DL (ref 31.5–35.7)
MCV RBC AUTO: 94 FL (ref 79–97)
MONOCYTES # BLD AUTO: 0.4 X10E3/UL (ref 0.1–0.9)
MONOCYTES NFR BLD AUTO: 8 %
NEUTROPHILS # BLD AUTO: 2.8 X10E3/UL (ref 1.4–7)
NEUTROPHILS NFR BLD AUTO: 58 %
PLATELET # BLD AUTO: 309 X10E3/UL (ref 150–450)
POTASSIUM SERPL-SCNC: 4.5 MMOL/L (ref 3.5–5.2)
PROT SERPL-MCNC: 7.3 G/DL (ref 6–8.5)
RBC # BLD AUTO: 4.61 X10E6/UL (ref 3.77–5.28)
SODIUM SERPL-SCNC: 139 MMOL/L (ref 134–144)
SPECIMEN STATUS: NORMAL
T4 FREE SERPL-MCNC: 1.41 NG/DL (ref 0.82–1.77)
TSH SERPL DL<=0.005 MIU/L-ACNC: 1.6 UIU/ML (ref 0.45–4.5)
WBC # BLD AUTO: 4.7 X10E3/UL (ref 3.4–10.8)

## 2021-07-26 ENCOUNTER — HOSPITAL ENCOUNTER (OUTPATIENT)
Facility: HOSPITAL | Age: 36
Setting detail: HOSPITAL OUTPATIENT SURGERY
Discharge: HOME | End: 2021-07-26
Attending: INTERNAL MEDICINE | Admitting: INTERNAL MEDICINE

## 2021-07-26 ENCOUNTER — ANESTHESIA EVENT (OUTPATIENT)
Dept: GASTROENTEROLOGY | Facility: HOSPITAL | Age: 36
End: 2021-07-26

## 2021-07-26 ENCOUNTER — ANESTHESIA (OUTPATIENT)
Dept: GASTROENTEROLOGY | Facility: HOSPITAL | Age: 36
End: 2021-07-26

## 2021-07-26 VITALS
TEMPERATURE: 97.1 F | HEIGHT: 66 IN | DIASTOLIC BLOOD PRESSURE: 78 MMHG | WEIGHT: 117 LBS | OXYGEN SATURATION: 98 % | BODY MASS INDEX: 18.8 KG/M2 | HEART RATE: 92 BPM | SYSTOLIC BLOOD PRESSURE: 121 MMHG | RESPIRATION RATE: 18 BRPM

## 2021-07-26 DIAGNOSIS — K21.9 GASTROESOPHAGEAL REFLUX DISEASE, UNSPECIFIED WHETHER ESOPHAGITIS PRESENT: ICD-10-CM

## 2021-07-26 PROCEDURE — 87081 CULTURE SCREEN ONLY: CPT | Performed by: INTERNAL MEDICINE

## 2021-07-26 PROCEDURE — 43239 EGD BIOPSY SINGLE/MULTIPLE: CPT | Performed by: INTERNAL MEDICINE

## 2021-07-26 PROCEDURE — 25010000002 PROPOFOL 10 MG/ML EMULSION: Performed by: NURSE ANESTHETIST, CERTIFIED REGISTERED

## 2021-07-26 RX ORDER — MIDAZOLAM HYDROCHLORIDE 1 MG/ML
1 INJECTION INTRAMUSCULAR; INTRAVENOUS
Status: CANCELLED | OUTPATIENT
Start: 2021-07-26

## 2021-07-26 RX ORDER — SODIUM CHLORIDE 0.9 % (FLUSH) 0.9 %
10 SYRINGE (ML) INJECTION AS NEEDED
Status: DISCONTINUED | OUTPATIENT
Start: 2021-07-26 | End: 2021-07-26 | Stop reason: HOSPADM

## 2021-07-26 RX ORDER — SODIUM CHLORIDE 9 MG/ML
500 INJECTION, SOLUTION INTRAVENOUS CONTINUOUS PRN
Status: DISCONTINUED | OUTPATIENT
Start: 2021-07-26 | End: 2021-07-26 | Stop reason: HOSPADM

## 2021-07-26 RX ORDER — PROPOFOL 10 MG/ML
VIAL (ML) INTRAVENOUS AS NEEDED
Status: DISCONTINUED | OUTPATIENT
Start: 2021-07-26 | End: 2021-07-26 | Stop reason: SURG

## 2021-07-26 RX ORDER — SODIUM CHLORIDE 9 MG/ML
100 INJECTION, SOLUTION INTRAVENOUS CONTINUOUS
Status: CANCELLED | OUTPATIENT
Start: 2021-07-26

## 2021-07-26 RX ORDER — SODIUM CHLORIDE 0.9 % (FLUSH) 0.9 %
10 SYRINGE (ML) INJECTION AS NEEDED
Status: CANCELLED | OUTPATIENT
Start: 2021-07-26

## 2021-07-26 RX ORDER — LIDOCAINE HYDROCHLORIDE 20 MG/ML
INJECTION, SOLUTION EPIDURAL; INFILTRATION; INTRACAUDAL; PERINEURAL AS NEEDED
Status: DISCONTINUED | OUTPATIENT
Start: 2021-07-26 | End: 2021-07-26 | Stop reason: SURG

## 2021-07-26 RX ORDER — SODIUM CHLORIDE 0.9 % (FLUSH) 0.9 %
10 SYRINGE (ML) INJECTION EVERY 12 HOURS SCHEDULED
Status: CANCELLED | OUTPATIENT
Start: 2021-07-26

## 2021-07-26 RX ADMIN — PROPOFOL 160 MG: 10 INJECTION, EMULSION INTRAVENOUS at 08:02

## 2021-07-26 RX ADMIN — SODIUM CHLORIDE 500 ML: 9 INJECTION, SOLUTION INTRAVENOUS at 07:23

## 2021-07-26 RX ADMIN — LIDOCAINE HYDROCHLORIDE 100 MG: 20 INJECTION, SOLUTION EPIDURAL; INFILTRATION; INTRACAUDAL; PERINEURAL at 08:02

## 2021-07-26 NOTE — ANESTHESIA POSTPROCEDURE EVALUATION
"Patient: Mago Gamboa    Procedure Summary     Date: 07/26/21 Room / Location: [facility] ENDOSCOPY 5 / [facility] ENDOSCOPY    Anesthesia Start: 0757 Anesthesia Stop: 0812    Procedure: ESOPHAGOGASTRODUODENOSCOPY WITH ANESTHESIA (N/A ) Diagnosis:       Gastroesophageal reflux disease, unspecified whether esophagitis present      (Gastroesophageal reflux disease, unspecified whether esophagitis present [K21.9])    Surgeons: Keegan Pope DO Provider: Kristopher Sanchez CRNA    Anesthesia Type: MAC ASA Status: 2          Anesthesia Type: MAC    Vitals  Vitals Value Taken Time   /78 07/26/21 0820   Temp     Pulse 92 07/26/21 0820   Resp 18 07/26/21 0820   SpO2 98 % 07/26/21 0820           Post Anesthesia Care and Evaluation    Patient location during evaluation: PACU  Patient participation: complete - patient participated  Level of consciousness: awake and alert  Pain management: adequate  Airway patency: patent  Anesthetic complications: No anesthetic complications    Cardiovascular status: acceptable  Respiratory status: acceptable  Hydration status: acceptable    Comments: Blood pressure 121/78, pulse 92, temperature 97.1 °F (36.2 °C), temperature source Tympanic, resp. rate 18, height 167.6 cm (66\"), weight 53.1 kg (117 lb), SpO2 98 %, not currently breastfeeding.    Pt discharged from PACU based on romero score >8      "

## 2021-07-26 NOTE — ANESTHESIA PREPROCEDURE EVALUATION
Anesthesia Evaluation     Patient summary reviewed   no history of anesthetic complications:  NPO Solid Status: > 8 hours  NPO Liquid Status: > 8 hours           Airway   Mallampati: II  Neck ROM: full  No difficulty expected  Dental      Pulmonary    (+) asthma,  (-) not a smoker  Cardiovascular   Exercise tolerance: good (4-7 METS)    (+) dysrhythmias Tachycardia,       Neuro/Psych  (+) headaches, psychiatric history,     GI/Hepatic/Renal/Endo    (+)  GERD,    (-) liver disease, no renal disease, diabetes, no thyroid disorder    Musculoskeletal     Abdominal    Substance History   (+) alcohol use (social ),      OB/GYN negative ob/gyn ROS         Other   arthritis,        Other Comment: Chronic nausea--zofran taken regularly       Phys Exam Other: High anxiety                 Anesthesia Plan    ASA 2     MAC     intravenous induction     Anesthetic plan, all risks, benefits, and alternatives have been provided, discussed and informed consent has been obtained with: patient.

## 2021-07-26 NOTE — H&P
[facility]  Pre Procedure History & Physical    Chief Complaint:   N/V    Subjective     HPI:   N/V    Past Medical History:   Past Medical History:   Diagnosis Date   • Allergic 2005   • Anxiety 2010   • Asthma, extrinsic    • GERD (gastroesophageal reflux disease)    • Globus sensation 6/15/2021   • Headache 2002   • Intractable nausea and vomiting 6/15/2021   • Mild intermittent asthma without complication 6/15/2021   • Tachycardia        Past Surgical History:  Past Surgical History:   Procedure Laterality Date   • ADENOIDECTOMY     • APPENDECTOMY     • CARDIAC SURGERY      loop recorder   • CHOLECYSTECTOMY     • COLONOSCOPY  2015   • ENDOMETRIAL ABLATION     • HYSTERECTOMY     • SUBTOTAL HYSTERECTOMY  2016   • TONSILLECTOMY         Family History:  Family History   Adopted: Yes   Problem Relation Age of Onset   • COPD Maternal Grandmother    • Heart disease Maternal Grandmother    • Cancer Maternal Grandfather    • Stroke Maternal Grandfather    • Colon cancer Neg Hx    • Esophageal cancer Neg Hx        Social History:   reports that she has never smoked. She has never used smokeless tobacco. She reports current alcohol use. She reports that she does not use drugs.    Medications:   Prior to Admission medications    Medication Sig Start Date End Date Taking? Authorizing Provider   multivitamin with minerals tablet tablet Take 1 tablet by mouth Daily.   Yes Albaro Kenyon MD   ondansetron ODT (ZOFRAN-ODT) 8 MG disintegrating tablet Place 1 tablet every 8 hours by translingual route as needed. 5/20/21  Yes Albaro Kenyon MD   albuterol (PROVENTIL) (2.5 MG/3ML) 0.083% nebulizer solution INHALE 3 ML PO Q 4 - 6 H PRM    Albaro Kenyon MD   albuterol sulfate  (90 Base) MCG/ACT inhaler Inhale 2 puffs Every 4 (Four) Hours As Needed for Wheezing.    Albaro Kenyon MD   ALPRAZolam (XANAX) 0.5 MG tablet Take 0.05 mg by mouth 2 (Two) Times a Day As Needed.    Provider  "MD Albaro   cholestyramine (QUESTRAN) 4 g packet Take 1 packet by mouth 3 (Three) Times a Day.    Provider, MD Albaro   ondansetron (ZOFRAN) 8 MG tablet Take  by mouth Every 8 (Eight) Hours As Needed for Nausea or Vomiting.    Provider, MD Albaro       Allergies:  Compazine [prochlorperazine], Iodinated diagnostic agents, and Sulfa antibiotics    ROS:    General: Weight stable  Resp: No SOA  Cardiovascular: No CP    Objective     Blood pressure 128/80, pulse 99, temperature 97.1 °F (36.2 °C), temperature source Tympanic, resp. rate 18, height 167.6 cm (66\"), weight 53.1 kg (117 lb), SpO2 100 %, not currently breastfeeding.    Physical Exam   Constitutional: Pt is oriented to person, place, and in no distress.   HENT: Mouth/Throat: Oropharynx is clear.   Cardiovascular: Normal rate, regular rhythm.    Pulmonary/Chest: Effort normal. No respiratory distress. No  wheezes.   Abdominal: Soft. Non-distended.  Skin: Skin is warm and dry.   Psychiatric: Mood, memory, affect and judgment appear normal.     Assessment/Plan     Diagnosis:  N/V    Anticipated Surgical Procedure:  EGD    The risks, benefits, and alternatives of this procedure have been discussed with the patient or the responsible party- the patient understands and agrees to proceed.        "

## 2021-07-27 LAB — UREASE TISS QL: NEGATIVE

## 2021-08-13 ENCOUNTER — OFFICE VISIT (OUTPATIENT)
Dept: INTERNAL MEDICINE | Facility: CLINIC | Age: 36
End: 2021-08-13

## 2021-08-13 VITALS
BODY MASS INDEX: 18.32 KG/M2 | TEMPERATURE: 98.5 F | WEIGHT: 114 LBS | DIASTOLIC BLOOD PRESSURE: 70 MMHG | SYSTOLIC BLOOD PRESSURE: 110 MMHG | HEIGHT: 66 IN | OXYGEN SATURATION: 98 % | HEART RATE: 127 BPM | RESPIRATION RATE: 18 BRPM

## 2021-08-13 DIAGNOSIS — R11.2 INTRACTABLE VOMITING WITH NAUSEA: ICD-10-CM

## 2021-08-13 DIAGNOSIS — E86.0 DEHYDRATION: Primary | ICD-10-CM

## 2021-08-13 DIAGNOSIS — Z86.69 HISTORY OF MIGRAINE HEADACHES: ICD-10-CM

## 2021-08-13 PROCEDURE — 99214 OFFICE O/P EST MOD 30 MIN: CPT | Performed by: NURSE PRACTITIONER

## 2021-08-13 PROCEDURE — 96360 HYDRATION IV INFUSION INIT: CPT | Performed by: NURSE PRACTITIONER

## 2021-08-13 RX ORDER — SODIUM CHLORIDE 9 MG/ML
999 INJECTION, SOLUTION INTRAVENOUS ONCE
Status: COMPLETED | OUTPATIENT
Start: 2021-08-13 | End: 2021-08-13

## 2021-08-13 RX ORDER — DICYCLOMINE HYDROCHLORIDE 10 MG/1
10 CAPSULE ORAL
Qty: 120 CAPSULE | Refills: 1 | Status: SHIPPED | OUTPATIENT
Start: 2021-08-13 | End: 2021-09-17

## 2021-08-13 RX ADMIN — SODIUM CHLORIDE 999 ML/HR: 9 INJECTION, SOLUTION INTRAVENOUS at 12:03

## 2021-08-13 NOTE — PROGRESS NOTES
Subjective     Chief Complaint   Patient presents with   • Nausea   • Dry Heaving   • Fatigue       History of Present Illness  Pt comes in today with ongoing nausea, vomiting, fatigue. She underwent EGD which was normal. Urea negative. States she felt bad for a week but then got better for a week. She started having dry heaves on Tuesday, but over the past 24 hours, she has had constant dry heaves and nausea. Bowels were normal yesterday. She has lost another 3 pounds. No hematemesis or hemoptysis.     She does carry a history of migraine headaches. Will have nausea with them as well.     Review of Systems   Otherwise complete ROS reviewed    Past Medical History:   Past Medical History:   Diagnosis Date   • Allergic 2005   • Anxiety 2010   • Asthma, extrinsic    • GERD (gastroesophageal reflux disease)    • Globus sensation 6/15/2021   • Headache 2002   • Intractable nausea and vomiting 6/15/2021   • Mild intermittent asthma without complication 6/15/2021   • Tachycardia      Past Surgical History:  Past Surgical History:   Procedure Laterality Date   • ADENOIDECTOMY     • APPENDECTOMY     • CARDIAC SURGERY      loop recorder   • CHOLECYSTECTOMY     • COLONOSCOPY  2015   • ENDOMETRIAL ABLATION     • ENDOSCOPY N/A 7/26/2021    Procedure: ESOPHAGOGASTRODUODENOSCOPY WITH ANESTHESIA;  Surgeon: Keegan Pope DO;  Location: [facility] ENDOSCOPY;  Service: Gastroenterology;  Laterality: N/A;  pre GERD  post  jesus BLOCK   • HYSTERECTOMY     • SUBTOTAL HYSTERECTOMY  2016   • TONSILLECTOMY       Social History:  reports that she has never smoked. She has never used smokeless tobacco. She reports current alcohol use. She reports that she does not use drugs.    Family History: family history includes COPD in her maternal grandmother; Cancer in her maternal grandfather; Heart disease in her maternal grandmother; Stroke in her maternal grandfather. She was adopted.      Allergies:  Allergies   Allergen  "Reactions   • Compazine [Prochlorperazine] Palpitations   • Iodinated Diagnostic Agents Anaphylaxis   • Sulfa Antibiotics Angioedema, Other (See Comments) and Rash     breathing difficulty       Medications:  Prior to Admission medications    Medication Sig Start Date End Date Taking? Authorizing Provider   albuterol (PROVENTIL) (2.5 MG/3ML) 0.083% nebulizer solution INHALE 3 ML PO Q 4 - 6 H PRM   Yes Albaro Kenyon MD   albuterol sulfate  (90 Base) MCG/ACT inhaler Inhale 2 puffs Every 4 (Four) Hours As Needed for Wheezing.   Yes Albaro Kenyon MD   ALPRAZolam (XANAX) 0.5 MG tablet Take 0.05 mg by mouth 2 (Two) Times a Day As Needed.   Yes Albaro Kenyon MD   multivitamin with minerals tablet tablet Take 1 tablet by mouth Daily.   Yes Albaro Kenyon MD   ondansetron (ZOFRAN) 8 MG tablet Take  by mouth Every 8 (Eight) Hours As Needed for Nausea or Vomiting.   Yes Albaro Kenyon MD   ondansetron ODT (ZOFRAN-ODT) 8 MG disintegrating tablet Place 1 tablet every 8 hours by translingual route as needed. 5/20/21  Yes Albaro Kenyon MD   cholestyramine (QUESTRAN) 4 g packet Take 1 packet by mouth 3 (Three) Times a Day.    Albaro Kenyon MD       Objective     Vital Signs: /70 (BP Location: Right arm, Patient Position: Sitting, Cuff Size: Adult)   Pulse (!) 127   Temp 98.5 °F (36.9 °C) (Skin)   Resp 18   Ht 167.6 cm (66\")   Wt 51.7 kg (114 lb)   SpO2 98%   BMI 18.40 kg/m²   Physical Exam  Vitals reviewed.   Constitutional:       Appearance: She is well-developed.      Comments: thin   HENT:      Head: Normocephalic and atraumatic.   Eyes:      Pupils: Pupils are equal, round, and reactive to light.   Neck:      Vascular: No JVD.   Cardiovascular:      Rate and Rhythm: Regular rhythm. Tachycardia present.   Pulmonary:      Effort: Pulmonary effort is normal.      Breath sounds: Normal breath sounds.   Abdominal:      General: Bowel sounds are normal.      " Palpations: Abdomen is soft.   Musculoskeletal:         General: No deformity.      Cervical back: Normal range of motion and neck supple.   Lymphadenopathy:      Cervical: No cervical adenopathy.   Skin:     General: Skin is warm and dry.   Neurological:      Mental Status: She is alert and oriented to person, place, and time.   Psychiatric:         Behavior: Behavior normal.         Thought Content: Thought content normal.         Judgment: Judgment normal.       Results Reviewed:  BUN   Date Value Ref Range Status   07/22/2021 6 6 - 20 mg/dL Final     Creatinine   Date Value Ref Range Status   07/22/2021 0.76 0.57 - 1.00 mg/dL Final     Sodium   Date Value Ref Range Status   07/22/2021 139 134 - 144 mmol/L Final     Potassium   Date Value Ref Range Status   07/22/2021 4.5 3.5 - 5.2 mmol/L Final     Chloride   Date Value Ref Range Status   07/22/2021 102 96 - 106 mmol/L Final     Total CO2   Date Value Ref Range Status   07/22/2021 24 20 - 29 mmol/L Final     Calcium   Date Value Ref Range Status   07/22/2021 9.6 8.7 - 10.2 mg/dL Final     ALT (SGPT)   Date Value Ref Range Status   07/22/2021 6 0 - 32 IU/L Final     AST (SGOT)   Date Value Ref Range Status   07/22/2021 8 0 - 40 IU/L Final     WBC   Date Value Ref Range Status   07/22/2021 4.7 3.4 - 10.8 x10E3/uL Final   05/24/2018 5.3 4.0 - 10.5 10*3/uL Final     Hematocrit   Date Value Ref Range Status   07/22/2021 43.2 34.0 - 46.6 % Final   05/24/2018 40.0 37.0 - 47.0 % Final     Platelets   Date Value Ref Range Status   07/22/2021 309 150 - 450 x10E3/uL Final   05/24/2018 295 150 - 450 10*3/uL Final         Assessment / Plan     Assessment/Plan:  Diagnoses and all orders for this visit:    1. Dehydration (Primary)  -     sodium chloride 0.9 % infusion    2. Intractable vomiting with nausea  -     dicyclomine (Bentyl) 10 MG capsule; Take 1 capsule by mouth 4 (Four) Times a Day Before Meals & at Bedtime.  Dispense: 120 capsule; Refill: 1  -     sodium chloride  0.9 % infusion    3. History of migraine headaches  -     CT Head Without Contrast; Future    we have infused 1 liter of saline as a bolus.   She is feeling some better after fluids.   Will see how she responds to bentyl. She is allergic to Compazine.       Return [facility] appointment in August.. unless patient needs to be seen sooner or acute issues arise.    Code Status: full.     I have discussed the patient results/orders and and plan/recommendation with them at today's visit.      Jesusita Patterson, MCKAYLA   08/13/2021

## 2021-08-13 NOTE — PROGRESS NOTES
Placement Date: 8/13/21  Placement Time:1150  Hand Hygiene Completed: Yes  Size (Gauge): 22 G  Orientation: R  Location: AC  Site Prep: Chloraprep  Local Anesthetic: No  Technique: Anatomical Landmarks  Inserted by: Rosemarie Borja RN  Total insertion attempts: 1  Patient tolerance: Tolerated well  Removal Date: 8/13/21  Removal Time: 1315    IV removed, tip intact. Site without signs and symptoms of complications. Dressing and pressure applied.     Intravenous fluids were administered 999 ML's  Start time:1155  End Time:1312    Patient monitored during and after fluid administration by RN. No signs and symptoms of complication noted.

## 2021-08-19 ENCOUNTER — OFFICE VISIT (OUTPATIENT)
Dept: INTERNAL MEDICINE | Facility: CLINIC | Age: 36
End: 2021-08-19

## 2021-08-19 VITALS
RESPIRATION RATE: 16 BRPM | HEART RATE: 104 BPM | DIASTOLIC BLOOD PRESSURE: 60 MMHG | BODY MASS INDEX: 18.32 KG/M2 | SYSTOLIC BLOOD PRESSURE: 106 MMHG | TEMPERATURE: 98.4 F | OXYGEN SATURATION: 98 % | WEIGHT: 114 LBS | HEIGHT: 66 IN

## 2021-08-19 DIAGNOSIS — R05.9 COUGH: Primary | ICD-10-CM

## 2021-08-19 PROCEDURE — U0004 COV-19 TEST NON-CDC HGH THRU: HCPCS | Performed by: NURSE PRACTITIONER

## 2021-08-19 PROCEDURE — 99213 OFFICE O/P EST LOW 20 MIN: CPT | Performed by: NURSE PRACTITIONER

## 2021-08-20 ENCOUNTER — HOSPITAL ENCOUNTER (OUTPATIENT)
Dept: CT IMAGING | Facility: HOSPITAL | Age: 36
Discharge: HOME | End: 2021-08-20
Admitting: NURSE PRACTITIONER

## 2021-08-20 DIAGNOSIS — Z86.69 HISTORY OF MIGRAINE HEADACHES: ICD-10-CM

## 2021-08-20 LAB — SARS-COV-2 ORF1AB RESP QL NAA+PROBE: NOT DETECTED

## 2021-08-20 PROCEDURE — 70450 CT HEAD/BRAIN W/O DYE: CPT

## 2021-08-20 NOTE — PROGRESS NOTES
Subjective     Chief Complaint   Patient presents with   • Cough       History of Present Illness  Pt co mes in today with complaints of a cough. Has been for a week. Non-productive. No fevers. No loss of taste or smell. Has been taking at home COVID tests for the past week and they have all been negative. Has not taken anything. Is not vaccinated against COVID. Son has similar symptoms.  No other aggravating or alleviating factors.      Review of Systems   Otherwise complete ROS reviewed and negative.    Past Medical History:   Past Medical History:   Diagnosis Date   • Allergic 2005   • Anxiety 2010   • Asthma, extrinsic    • GERD (gastroesophageal reflux disease)    • Globus sensation 6/15/2021   • Headache 2002   • Intractable nausea and vomiting 6/15/2021   • Mild intermittent asthma without complication 6/15/2021   • Tachycardia      Past Surgical History:  Past Surgical History:   Procedure Laterality Date   • ADENOIDECTOMY     • APPENDECTOMY     • CARDIAC SURGERY      loop recorder   • CHOLECYSTECTOMY     • COLONOSCOPY  2015   • ENDOMETRIAL ABLATION     • ENDOSCOPY N/A 7/26/2021    Procedure: ESOPHAGOGASTRODUODENOSCOPY WITH ANESTHESIA;  Surgeon: Keegan Pope DO;  Location: [facility] ENDOSCOPY;  Service: Gastroenterology;  Laterality: N/A;  pre GERD  post  jesus varela APRN   • HYSTERECTOMY     • SUBTOTAL HYSTERECTOMY  2016   • TONSILLECTOMY       Social History:  reports that she has never smoked. She has never used smokeless tobacco. She reports current alcohol use. She reports that she does not use drugs.    Family History: family history includes COPD in her maternal grandmother; Cancer in her maternal grandfather; Heart disease in her maternal grandmother; Stroke in her maternal grandfather. She was adopted.       Allergies:  Allergies   Allergen Reactions   • Compazine [Prochlorperazine] Palpitations   • Iodinated Diagnostic Agents Anaphylaxis   • Sulfa Antibiotics Angioedema, Other (See  "Comments) and Rash     breathing difficulty       Medications:  Prior to Admission medications    Medication Sig Start Date End Date Taking? Authorizing Provider   albuterol (PROVENTIL) (2.5 MG/3ML) 0.083% nebulizer solution INHALE 3 ML PO Q 4 - 6 H PRM   Yes Albaro Kenyon MD   albuterol sulfate  (90 Base) MCG/ACT inhaler Inhale 2 puffs Every 4 (Four) Hours As Needed for Wheezing.   Yes Albaro Kenyon MD   ALPRAZolam (XANAX) 0.5 MG tablet Take 0.05 mg by mouth 2 (Two) Times a Day As Needed.   Yes Albaro Kenyon MD   cholestyramine (QUESTRAN) 4 g packet Take 1 packet by mouth 3 (Three) Times a Day.   Yes Albaro Kenyon MD   dicyclomine (Bentyl) 10 MG capsule Take 1 capsule by mouth 4 (Four) Times a Day Before Meals & at Bedtime. 8/13/21  Yes Jesusita Patterson APRN   multivitamin with minerals tablet tablet Take 1 tablet by mouth Daily.   Yes Albaro Kenyon MD   ondansetron (ZOFRAN) 8 MG tablet Take  by mouth Every 8 (Eight) Hours As Needed for Nausea or Vomiting.   Yes Albaro Kenyon MD   ondansetron ODT (ZOFRAN-ODT) 8 MG disintegrating tablet Place 1 tablet every 8 hours by translingual route as needed. 5/20/21  Yes Albaro Kenyon MD       Objective     Vital Signs: /60   Pulse 104   Temp 98.4 °F (36.9 °C)   Resp 16   Ht 167.6 cm (66\")   Wt 51.7 kg (114 lb)   SpO2 98%   BMI 18.40 kg/m²   Physical Exam  Vitals reviewed.   Constitutional:       Appearance: She is well-developed.   HENT:      Head: Normocephalic and atraumatic.   Eyes:      Pupils: Pupils are equal, round, and reactive to light.   Neck:      Vascular: No JVD.   Cardiovascular:      Rate and Rhythm: Normal rate and regular rhythm.   Pulmonary:      Effort: Pulmonary effort is normal.      Breath sounds: Normal breath sounds.   Abdominal:      General: Bowel sounds are normal.      Palpations: Abdomen is soft.   Musculoskeletal:         General: No deformity.      Cervical back: " Normal range of motion and neck supple.   Lymphadenopathy:      Cervical: No cervical adenopathy.   Skin:     General: Skin is warm and dry.   Neurological:      Mental Status: She is alert and oriented to person, place, and time.   Psychiatric:         Behavior: Behavior normal.         Thought Content: Thought content normal.         Judgment: Judgment normal.       Results Reviewed:  BUN   Date Value Ref Range Status   07/22/2021 6 6 - 20 mg/dL Final     Creatinine   Date Value Ref Range Status   07/22/2021 0.76 0.57 - 1.00 mg/dL Final     Sodium   Date Value Ref Range Status   07/22/2021 139 134 - 144 mmol/L Final     Potassium   Date Value Ref Range Status   07/22/2021 4.5 3.5 - 5.2 mmol/L Final     Chloride   Date Value Ref Range Status   07/22/2021 102 96 - 106 mmol/L Final     Total CO2   Date Value Ref Range Status   07/22/2021 24 20 - 29 mmol/L Final     Calcium   Date Value Ref Range Status   07/22/2021 9.6 8.7 - 10.2 mg/dL Final     ALT (SGPT)   Date Value Ref Range Status   07/22/2021 6 0 - 32 IU/L Final     AST (SGOT)   Date Value Ref Range Status   07/22/2021 8 0 - 40 IU/L Final     WBC   Date Value Ref Range Status   07/22/2021 4.7 3.4 - 10.8 x10E3/uL Final   05/24/2018 5.3 4.0 - 10.5 10*3/uL Final     Hematocrit   Date Value Ref Range Status   07/22/2021 43.2 34.0 - 46.6 % Final   05/24/2018 40.0 37.0 - 47.0 % Final     Platelets   Date Value Ref Range Status   07/22/2021 309 150 - 450 x10E3/uL Final   05/24/2018 295 150 - 450 10*3/uL Final         Assessment / Plan     Assessment/Plan:  Diagnoses and all orders for this visit:    1. Cough (Primary)  -     COVID-19,APTIMA PANTHER,PAD IN-HOUSE,NP/OP/NASAL SWAB IN UTM/VTM/SALINE/LIQUID AMIES TRANSPORT MEDIA/NP WASH OR ASPIRATE, 24 HR TAT - Swab, Nasal Cavity      No follow-ups on file. unless patient needs to be seen sooner or acute issues arise.    Code Status: Full.     I have discussed the patient results/orders and and plan/recommendation with  them at today's visit.      Jesusita Patterson, APRN   08/19/2021

## 2021-09-02 ENCOUNTER — TELEPHONE (OUTPATIENT)
Dept: INTERNAL MEDICINE | Facility: CLINIC | Age: 36
End: 2021-09-02

## 2021-09-02 NOTE — TELEPHONE ENCOUNTER
Can you call this pt and get her an apt with Pinky and put UDS on note please? Thank you! Apt would be for medication refill

## 2021-09-02 NOTE — TELEPHONE ENCOUNTER
Incoming Refill Request      Medication requested (name and dose): ALPRAZolam (XANAX) 0.5 MG tablet  ondansetron ODT (ZOFRAN-ODT) 8 MG disintegrating tablet    Pharmacy where request should be sent: [facility]    Additional details provided by patient: Pt stated that Jesusita MCKAYLA Patterson has not prescribed the xanax before. She is wanting to know if she needs an office visit first.    Best call back number: [phone]    Does the patient have less than a 3 day supply:  [x] Yes  [] No    Nichelle Magallon, Reanna Rep  09/02/21, 07:24 CDT             Patient picked up: prescription.   Identity was verified: Yes.

## 2021-09-03 ENCOUNTER — OFFICE VISIT (OUTPATIENT)
Dept: INTERNAL MEDICINE | Facility: CLINIC | Age: 36
End: 2021-09-03

## 2021-09-03 VITALS
OXYGEN SATURATION: 98 % | RESPIRATION RATE: 17 BRPM | SYSTOLIC BLOOD PRESSURE: 120 MMHG | TEMPERATURE: 97.8 F | HEART RATE: 115 BPM | HEIGHT: 66 IN | DIASTOLIC BLOOD PRESSURE: 68 MMHG | WEIGHT: 116.2 LBS | BODY MASS INDEX: 18.68 KG/M2

## 2021-09-03 DIAGNOSIS — Z79.899 CHRONICALLY ON BENZODIAZEPINE THERAPY: ICD-10-CM

## 2021-09-03 DIAGNOSIS — F41.9 ANXIETY: Primary | ICD-10-CM

## 2021-09-03 DIAGNOSIS — R11.2 INTRACTABLE VOMITING WITH NAUSEA: ICD-10-CM

## 2021-09-03 DIAGNOSIS — R07.89 CHEST PRESSURE: ICD-10-CM

## 2021-09-03 DIAGNOSIS — R05.9 COUGH: ICD-10-CM

## 2021-09-03 PROCEDURE — 99214 OFFICE O/P EST MOD 30 MIN: CPT | Performed by: NURSE PRACTITIONER

## 2021-09-03 RX ORDER — ALBUTEROL SULFATE 90 UG/1
2 AEROSOL, METERED RESPIRATORY (INHALATION) EVERY 4 HOURS PRN
Qty: 18 G | Refills: 3 | Status: SHIPPED | OUTPATIENT
Start: 2021-09-03

## 2021-09-03 RX ORDER — ONDANSETRON HYDROCHLORIDE 8 MG/1
8 TABLET, FILM COATED ORAL EVERY 8 HOURS PRN
Qty: 30 TABLET | Refills: 6 | Status: SHIPPED | OUTPATIENT
Start: 2021-09-03 | End: 2021-09-17

## 2021-09-03 RX ORDER — ALBUTEROL SULFATE 2.5 MG/3ML
SOLUTION RESPIRATORY (INHALATION)
Status: CANCELLED | OUTPATIENT
Start: 2021-09-03

## 2021-09-03 RX ORDER — ALPRAZOLAM 0.5 MG/1
0.5 TABLET ORAL 2 TIMES DAILY PRN
Qty: 60 TABLET | Refills: 0 | Status: SHIPPED | OUTPATIENT
Start: 2021-09-03 | End: 2021-09-17

## 2021-09-03 NOTE — PROGRESS NOTES
Subjective     Chief Complaint   Patient presents with   • Post vaccine issues     Chest pressure. Appt scheduled with cardiologist on the 9th.    • Med Refill       History of Present Illness  Patient is here for medication management. She states she received her first Covid vaccine last week. Since having the vaccine she complains of chest pressure.  The patient states she is scheduled to see her Cardiologist 9/9.  She states she feels pressure but no pain.  States the episodes last an hour or less.She did an EKG at her office and was OK.  Is using less zofran since COVID vaccine injection #1.  Denies increased shortness of breath. She states she uses her albuterol inhaler twice a week.   Patient states she takes xanax twice daily as needed. Has been on it for the past 10 years.     Review of Systems   Constitutional: Negative for chills and fever.   HENT: Negative for congestion.    Respiratory: Positive for chest tightness. Negative for cough and shortness of breath.    Gastrointestinal: Positive for constipation and nausea. Negative for abdominal pain, diarrhea and vomiting.   Genitourinary: Negative for difficulty urinating.   Neurological: Negative for dizziness and headaches.      Otherwise complete ROS reviewed and negative except as mentioned in the HPI.    Past Medical History:   Past Medical History:   Diagnosis Date   • Allergic 2005   • Anxiety 2010   • Asthma, extrinsic    • GERD (gastroesophageal reflux disease)    • Globus sensation 6/15/2021   • Headache 2002   • Intractable nausea and vomiting 6/15/2021   • Mild intermittent asthma without complication 6/15/2021   • Tachycardia      Past Surgical History:  Past Surgical History:   Procedure Laterality Date   • ADENOIDECTOMY     • APPENDECTOMY     • CARDIAC SURGERY      loop recorder   • CHOLECYSTECTOMY     • COLONOSCOPY  2015   • ENDOMETRIAL ABLATION     • ENDOSCOPY N/A 7/26/2021    Procedure: ESOPHAGOGASTRODUODENOSCOPY WITH ANESTHESIA;   Surgeon: Keegan Pope DO;  Location: [facility] ENDOSCOPY;  Service: Gastroenterology;  Laterality: N/A;  pre GERD  post  jesusita BLOCK   • HYSTERECTOMY     • SUBTOTAL HYSTERECTOMY  2016   • TONSILLECTOMY       Social History:  reports that she has never smoked. She has never used smokeless tobacco. She reports current alcohol use. She reports that she does not use drugs.    Family History: family history includes COPD in her maternal grandmother; Cancer in her maternal grandfather; Heart disease in her maternal grandmother; No Known Problems in her father and mother; Stroke in her maternal grandfather. She was adopted.       Allergies:  Allergies   Allergen Reactions   • Compazine [Prochlorperazine] Palpitations   • Iodinated Diagnostic Agents Anaphylaxis   • Sulfa Antibiotics Angioedema, Other (See Comments) and Rash     breathing difficulty       Medications:  Prior to Admission medications    Medication Sig Start Date End Date Taking? Authorizing Provider   albuterol (PROVENTIL) (2.5 MG/3ML) 0.083% nebulizer solution INHALE 3 ML PO Q 4 - 6 H PRM   Yes Albaro Kenyon MD   albuterol sulfate  (90 Base) MCG/ACT inhaler Inhale 2 puffs Every 4 (Four) Hours As Needed for Wheezing.   Yes Albaro Kenyon MD   ALPRAZolam (XANAX) 0.5 MG tablet Take 0.05 mg by mouth 2 (Two) Times a Day As Needed.   Yes Albaro Kenyon MD   cholestyramine (QUESTRAN) 4 g packet Take 1 packet by mouth 3 (Three) Times a Day.   Yes Albaro Kenyon MD   dicyclomine (Bentyl) 10 MG capsule Take 1 capsule by mouth 4 (Four) Times a Day Before Meals & at Bedtime. 8/13/21  Yes Jesusita Patterson APRN   multivitamin with minerals tablet tablet Take 1 tablet by mouth Daily.   Yes Albaro Kenyon MD   ondansetron (ZOFRAN) 8 MG tablet Take  by mouth Every 8 (Eight) Hours As Needed for Nausea or Vomiting.   Yes Albaro Kenyon MD   ondansetron ODT (ZOFRAN-ODT) 8 MG disintegrating tablet Place 1  "tablet every 8 hours by translingual route as needed. 5/20/21  Yes Provider, MD Albaro       Objective     Vital Signs: /68 (BP Location: Left arm, Patient Position: Sitting, Cuff Size: Adult)   Pulse 115   Temp 97.8 °F (36.6 °C) (Skin)   Resp 17   Ht 167.6 cm (66\")   Wt 52.7 kg (116 lb 3.2 oz)   SpO2 98%   BMI 18.76 kg/m²   Physical Exam  Vitals reviewed.   Constitutional:       Appearance: She is well-developed.   HENT:      Head: Normocephalic and atraumatic.   Eyes:      Pupils: Pupils are equal, round, and reactive to light.   Neck:      Vascular: No JVD.   Cardiovascular:      Rate and Rhythm: Regular rhythm. Tachycardia present.      Pulses: Normal pulses.      Heart sounds: Normal heart sounds.   Pulmonary:      Effort: Pulmonary effort is normal.      Breath sounds: Normal breath sounds.   Musculoskeletal:         General: No deformity.      Cervical back: Normal range of motion and neck supple.   Lymphadenopathy:      Cervical: No cervical adenopathy.   Skin:     General: Skin is warm and dry.   Neurological:      Mental Status: She is alert and oriented to person, place, and time.   Psychiatric:         Behavior: Behavior normal.         Thought Content: Thought content normal.         Judgment: Judgment normal.         Patient's Body mass index is 18.76 kg/m². indicating that she is within normal range (BMI 18.5-24.9). No BMI management plan needed..      Results Reviewed:  BUN   Date Value Ref Range Status   07/22/2021 6 6 - 20 mg/dL Final     Creatinine   Date Value Ref Range Status   07/22/2021 0.76 0.57 - 1.00 mg/dL Final     Sodium   Date Value Ref Range Status   07/22/2021 139 134 - 144 mmol/L Final     Potassium   Date Value Ref Range Status   07/22/2021 4.5 3.5 - 5.2 mmol/L Final     Chloride   Date Value Ref Range Status   07/22/2021 102 96 - 106 mmol/L Final     Total CO2   Date Value Ref Range Status   07/22/2021 24 20 - 29 mmol/L Final     Calcium   Date Value Ref Range " Status   07/22/2021 9.6 8.7 - 10.2 mg/dL Final     ALT (SGPT)   Date Value Ref Range Status   07/22/2021 6 0 - 32 IU/L Final     AST (SGOT)   Date Value Ref Range Status   07/22/2021 8 0 - 40 IU/L Final     WBC   Date Value Ref Range Status   07/22/2021 4.7 3.4 - 10.8 x10E3/uL Final   05/24/2018 5.3 4.0 - 10.5 10*3/uL Final     Hematocrit   Date Value Ref Range Status   07/22/2021 43.2 34.0 - 46.6 % Final   05/24/2018 40.0 37.0 - 47.0 % Final     Platelets   Date Value Ref Range Status   07/22/2021 309 150 - 450 x10E3/uL Final   05/24/2018 295 150 - 450 10*3/uL Final         Assessment / Plan     Assessment/Plan:  Diagnoses and all orders for this visit:    1. Anxiety (Primary)  -     ALPRAZolam (Xanax) 0.5 MG tablet; Take 1 tablet by mouth 2 (Two) Times a Day As Needed for Anxiety.  Dispense: 60 tablet; Refill: 0    2. Chest pressure   -Follow up with Cardiology as scheduled. Go to an ER for new or worsening symptoms.  3.. Chronically on benzodiazepine therapy  -     ToxASSURE Select 13 (MW) - Urine, Clean Catch    3. Intractable vomiting with nausea  -     ondansetron (ZOFRAN) 8 MG tablet; Take 1 tablet by mouth Every 8 (Eight) Hours As Needed for Nausea or Vomiting.  Dispense: 30 tablet; Refill: 6    4. Cough  -     albuterol sulfate  (90 Base) MCG/ACT inhaler; Inhale 2 puffs Every 4 (Four) Hours As Needed for Wheezing.  Dispense: 18 g; Refill: 3      Return in about 3 months (around 12/3/2021). unless patient needs to be seen sooner or acute issues arise.    Code Status: full    I have discussed the patient results/orders and and plan/recommendation with them at today's visit.      Jesusita Patterson, APRN   09/03/2021

## 2021-09-11 LAB — DRUGS UR: NORMAL

## 2021-09-15 PROBLEM — J45.909 ASTHMA: Status: RESOLVED | Noted: 2017-07-20 | Resolved: 2021-09-15

## 2021-09-15 PROBLEM — R49.0 HOARSE: Chronic | Status: ACTIVE | Noted: 2021-07-22

## 2021-09-17 ENCOUNTER — HOSPITAL ENCOUNTER (EMERGENCY)
Facility: HOSPITAL | Age: 36
Discharge: HOME | End: 2021-09-17
Admitting: FAMILY MEDICINE

## 2021-09-17 ENCOUNTER — APPOINTMENT (OUTPATIENT)
Dept: GENERAL RADIOLOGY | Facility: HOSPITAL | Age: 36
End: 2021-09-17

## 2021-09-17 VITALS
BODY MASS INDEX: 17.27 KG/M2 | DIASTOLIC BLOOD PRESSURE: 81 MMHG | HEIGHT: 67 IN | WEIGHT: 110 LBS | OXYGEN SATURATION: 100 % | RESPIRATION RATE: 16 BRPM | TEMPERATURE: 98.4 F | HEART RATE: 79 BPM | SYSTOLIC BLOOD PRESSURE: 136 MMHG

## 2021-09-17 DIAGNOSIS — J45.20 MILD INTERMITTENT ASTHMA WITHOUT COMPLICATION: Primary | Chronic | ICD-10-CM

## 2021-09-17 DIAGNOSIS — R00.2 PALPITATIONS: ICD-10-CM

## 2021-09-17 LAB
ALBUMIN SERPL-MCNC: 4.6 G/DL (ref 3.5–5.2)
ALBUMIN/GLOB SERPL: 1.6 G/DL
ALP SERPL-CCNC: 66 U/L (ref 39–117)
ALT SERPL W P-5'-P-CCNC: 32 U/L (ref 1–33)
ANION GAP SERPL CALCULATED.3IONS-SCNC: 10 MMOL/L (ref 5–15)
AST SERPL-CCNC: 30 U/L (ref 1–32)
BASOPHILS # BLD AUTO: 0.03 10*3/MM3 (ref 0–0.2)
BASOPHILS NFR BLD AUTO: 0.7 % (ref 0–1.5)
BILIRUB SERPL-MCNC: 0.7 MG/DL (ref 0–1.2)
BUN SERPL-MCNC: 5 MG/DL (ref 6–20)
BUN/CREAT SERPL: 8.9 (ref 7–25)
CALCIUM SPEC-SCNC: 9.3 MG/DL (ref 8.6–10.5)
CHLORIDE SERPL-SCNC: 102 MMOL/L (ref 98–107)
CO2 SERPL-SCNC: 27 MMOL/L (ref 22–29)
CREAT SERPL-MCNC: 0.56 MG/DL (ref 0.57–1)
D DIMER PPP FEU-MCNC: 0.39 MG/L (FEU) (ref 0–0.5)
D-LACTATE SERPL-SCNC: 1 MMOL/L (ref 0.5–2)
DEPRECATED RDW RBC AUTO: 39.5 FL (ref 37–54)
EOSINOPHIL # BLD AUTO: 0.02 10*3/MM3 (ref 0–0.4)
EOSINOPHIL NFR BLD AUTO: 0.5 % (ref 0.3–6.2)
ERYTHROCYTE [DISTWIDTH] IN BLOOD BY AUTOMATED COUNT: 12.2 % (ref 12.3–15.4)
GFR SERPL CREATININE-BSD FRML MDRD: 122 ML/MIN/1.73
GLOBULIN UR ELPH-MCNC: 2.8 GM/DL
GLUCOSE SERPL-MCNC: 96 MG/DL (ref 65–99)
HCT VFR BLD AUTO: 41.6 % (ref 34–46.6)
HGB BLD-MCNC: 14 G/DL (ref 12–15.9)
IMM GRANULOCYTES # BLD AUTO: 0.01 10*3/MM3 (ref 0–0.05)
IMM GRANULOCYTES NFR BLD AUTO: 0.2 % (ref 0–0.5)
LYMPHOCYTES # BLD AUTO: 0.85 10*3/MM3 (ref 0.7–3.1)
LYMPHOCYTES NFR BLD AUTO: 19.1 % (ref 19.6–45.3)
MCH RBC QN AUTO: 29.9 PG (ref 26.6–33)
MCHC RBC AUTO-ENTMCNC: 33.7 G/DL (ref 31.5–35.7)
MCV RBC AUTO: 88.7 FL (ref 79–97)
MONOCYTES # BLD AUTO: 0.28 10*3/MM3 (ref 0.1–0.9)
MONOCYTES NFR BLD AUTO: 6.3 % (ref 5–12)
NEUTROPHILS NFR BLD AUTO: 3.25 10*3/MM3 (ref 1.7–7)
NEUTROPHILS NFR BLD AUTO: 73.2 % (ref 42.7–76)
NRBC BLD AUTO-RTO: 0 /100 WBC (ref 0–0.2)
PLATELET # BLD AUTO: 255 10*3/MM3 (ref 140–450)
PMV BLD AUTO: 10.5 FL (ref 6–12)
POTASSIUM SERPL-SCNC: 3.9 MMOL/L (ref 3.5–5.2)
PROCALCITONIN SERPL-MCNC: 0.07 NG/ML (ref 0–0.25)
PROT SERPL-MCNC: 7.4 G/DL (ref 6–8.5)
RBC # BLD AUTO: 4.69 10*6/MM3 (ref 3.77–5.28)
SARS-COV-2 RNA PNL SPEC NAA+PROBE: NOT DETECTED
SODIUM SERPL-SCNC: 139 MMOL/L (ref 136–145)
TROPONIN T SERPL-MCNC: <0.01 NG/ML (ref 0–0.03)
TSH SERPL DL<=0.05 MIU/L-ACNC: 0.71 UIU/ML (ref 0.27–4.2)
WBC # BLD AUTO: 4.44 10*3/MM3 (ref 3.4–10.8)

## 2021-09-17 PROCEDURE — 84145 PROCALCITONIN (PCT): CPT | Performed by: NURSE PRACTITIONER

## 2021-09-17 PROCEDURE — 84443 ASSAY THYROID STIM HORMONE: CPT | Performed by: NURSE PRACTITIONER

## 2021-09-17 PROCEDURE — 93005 ELECTROCARDIOGRAM TRACING: CPT | Performed by: NURSE PRACTITIONER

## 2021-09-17 PROCEDURE — 93010 ELECTROCARDIOGRAM REPORT: CPT | Performed by: INTERNAL MEDICINE

## 2021-09-17 PROCEDURE — 85379 FIBRIN DEGRADATION QUANT: CPT | Performed by: NURSE PRACTITIONER

## 2021-09-17 PROCEDURE — 87635 SARS-COV-2 COVID-19 AMP PRB: CPT | Performed by: NURSE PRACTITIONER

## 2021-09-17 PROCEDURE — 85025 COMPLETE CBC W/AUTO DIFF WBC: CPT | Performed by: NURSE PRACTITIONER

## 2021-09-17 PROCEDURE — 99283 EMERGENCY DEPT VISIT LOW MDM: CPT

## 2021-09-17 PROCEDURE — 71045 X-RAY EXAM CHEST 1 VIEW: CPT

## 2021-09-17 PROCEDURE — 87040 BLOOD CULTURE FOR BACTERIA: CPT | Performed by: NURSE PRACTITIONER

## 2021-09-17 PROCEDURE — 84484 ASSAY OF TROPONIN QUANT: CPT | Performed by: NURSE PRACTITIONER

## 2021-09-17 PROCEDURE — 83605 ASSAY OF LACTIC ACID: CPT | Performed by: NURSE PRACTITIONER

## 2021-09-17 PROCEDURE — 80053 COMPREHEN METABOLIC PANEL: CPT | Performed by: NURSE PRACTITIONER

## 2021-09-17 RX ORDER — PREDNISONE 10 MG/1
10 TABLET ORAL 2 TIMES DAILY
Qty: 10 TABLET | Refills: 0 | Status: SHIPPED | OUTPATIENT
Start: 2021-09-17 | End: 2021-09-23 | Stop reason: ALTCHOICE

## 2021-09-17 RX ORDER — SODIUM CHLORIDE 0.9 % (FLUSH) 0.9 %
10 SYRINGE (ML) INJECTION AS NEEDED
Status: DISCONTINUED | OUTPATIENT
Start: 2021-09-17 | End: 2021-09-17 | Stop reason: HOSPADM

## 2021-09-17 RX ADMIN — SODIUM CHLORIDE, POTASSIUM CHLORIDE, SODIUM LACTATE AND CALCIUM CHLORIDE 1000 ML: 600; 310; 30; 20 INJECTION, SOLUTION INTRAVENOUS at 10:10

## 2021-09-18 LAB
QT INTERVAL: 364 MS
QTC INTERVAL: 438 MS

## 2021-09-22 LAB
BACTERIA SPEC AEROBE CULT: NORMAL
BACTERIA SPEC AEROBE CULT: NORMAL

## 2021-09-23 ENCOUNTER — OFFICE VISIT (OUTPATIENT)
Dept: PULMONOLOGY | Facility: CLINIC | Age: 36
End: 2021-09-23

## 2021-09-23 VITALS
OXYGEN SATURATION: 99 % | SYSTOLIC BLOOD PRESSURE: 110 MMHG | DIASTOLIC BLOOD PRESSURE: 60 MMHG | WEIGHT: 118 LBS | HEART RATE: 86 BPM | BODY MASS INDEX: 18.52 KG/M2 | HEIGHT: 67 IN

## 2021-09-23 DIAGNOSIS — R09.89 GLOBUS SENSATION: Chronic | ICD-10-CM

## 2021-09-23 DIAGNOSIS — K21.00 GASTROESOPHAGEAL REFLUX DISEASE WITH ESOPHAGITIS WITHOUT HEMORRHAGE: Chronic | ICD-10-CM

## 2021-09-23 DIAGNOSIS — J45.20 MILD INTERMITTENT ASTHMA WITHOUT COMPLICATION: Primary | Chronic | ICD-10-CM

## 2021-09-23 DIAGNOSIS — J30.1 SEASONAL ALLERGIC RHINITIS DUE TO POLLEN: Chronic | ICD-10-CM

## 2021-09-23 PROCEDURE — 99214 OFFICE O/P EST MOD 30 MIN: CPT | Performed by: NURSE PRACTITIONER

## 2021-09-23 PROCEDURE — 36415 COLL VENOUS BLD VENIPUNCTURE: CPT | Performed by: NURSE PRACTITIONER

## 2021-09-23 RX ORDER — AZELASTINE 1 MG/ML
2 SPRAY, METERED NASAL 2 TIMES DAILY
Qty: 1 EACH | Refills: 5 | Status: SHIPPED | OUTPATIENT
Start: 2021-09-23 | End: 2021-10-23

## 2021-09-28 LAB
A ALTERNATA IGE QN: <0.1 KU/L
A FUMIGATUS IGE QN: <0.1 KU/L
BERMUDA GRASS IGE QN: <0.1 KU/L
BOXELDER IGE QN: <0.1 KU/L
C HERBARUM IGE QN: <0.1 KU/L
CALIF WALNUT POLN IGE QN: <0.1 KU/L
CAT DANDER IGE QN: <0.1 KU/L
CMN PIGWEED IGE QN: <0.1 KU/L
COMMON RAGWEED IGE QN: <0.1 KU/L
CONV CLASS DESCRIPTION: NORMAL
COTTONWOOD IGE QN: <0.1 KU/L
D FARINAE IGE QN: <0.1 KU/L
D PTERONYSS IGE QN: <0.1 KU/L
DOG DANDER IGE QN: <0.1 KU/L
IGE SERPL-ACNC: 8 IU/ML (ref 6–495)
LONDON PLANE IGE QN: <0.1 KU/L
MOUSE URINE PROT IGE QN: <0.1 KU/L
MT JUNIPER IGE QN: <0.1 KU/L
P NOTATUM IGE QN: <0.1 KU/L
PECAN/HICK TREE IGE QN: <0.1 KU/L
ROACH IGE QN: <0.1 KU/L
SALTWORT IGE QN: <0.1 KU/L
SHEEP SORREL IGE QN: <0.1 KU/L
SILVER BIRCH IGE QN: <0.1 KU/L
TIMOTHY IGE QN: <0.1 KU/L
WHITE ASH IGE QN: <0.1 KU/L
WHITE ELM IGE QN: <0.1 KU/L
WHITE MULBERRY IGE QN: <0.1 KU/L
WHITE OAK IGE QN: <0.1 KU/L

## 2021-10-07 RX ORDER — PREDNISONE 20 MG/1
TABLET ORAL
Qty: 11 TABLET | Refills: 0 | Status: SHIPPED | OUTPATIENT
Start: 2021-10-07 | End: 2021-11-30

## 2021-10-13 DIAGNOSIS — F41.9 ANXIETY: Primary | ICD-10-CM

## 2021-10-13 NOTE — TELEPHONE ENCOUNTER
Rx Refill Note  Requested Prescriptions     Pending Prescriptions Disp Refills   • ALPRAZolam (XANAX) 0.5 MG tablet [Pharmacy Med Name: ALPRAZolam 0.5 MG Oral Tablet] 60 tablet 0     Sig: Take 1 tablet by mouth twice daily as needed for anxiety     Med last filled:  Last filled by historical provider.   Last office visit with prescribing clinician: 9/3/2021      Next office visit with prescribing clinician: 12/2/2021     ToxASSURE Select 13 (MW) - Urine, Clean Catch (09/03/2021 15:44)               Rosemarie Borja RN  10/13/21, 14:10 CDT

## 2021-10-14 RX ORDER — ALPRAZOLAM 0.5 MG/1
TABLET ORAL
Qty: 60 TABLET | Refills: 0 | Status: SHIPPED | OUTPATIENT
Start: 2021-10-14 | End: 2021-12-02 | Stop reason: SDUPTHER

## 2021-11-23 DIAGNOSIS — R00.0 TACHYCARDIA: Primary | ICD-10-CM

## 2021-11-30 ENCOUNTER — OFFICE VISIT (OUTPATIENT)
Dept: CARDIOLOGY | Facility: CLINIC | Age: 36
End: 2021-11-30

## 2021-11-30 VITALS
HEIGHT: 67 IN | OXYGEN SATURATION: 99 % | WEIGHT: 120 LBS | HEART RATE: 87 BPM | DIASTOLIC BLOOD PRESSURE: 65 MMHG | BODY MASS INDEX: 18.83 KG/M2 | SYSTOLIC BLOOD PRESSURE: 108 MMHG

## 2021-11-30 DIAGNOSIS — I47.1 PSVT (PAROXYSMAL SUPRAVENTRICULAR TACHYCARDIA) (HCC): ICD-10-CM

## 2021-11-30 DIAGNOSIS — R07.89 CHEST PRESSURE: ICD-10-CM

## 2021-11-30 DIAGNOSIS — R00.2 PALPITATIONS: ICD-10-CM

## 2021-11-30 DIAGNOSIS — R00.0 TACHYCARDIA: Primary | ICD-10-CM

## 2021-11-30 PROBLEM — I47.10 PSVT (PAROXYSMAL SUPRAVENTRICULAR TACHYCARDIA): Status: ACTIVE | Noted: 2021-11-30

## 2021-11-30 PROCEDURE — 93000 ELECTROCARDIOGRAM COMPLETE: CPT | Performed by: INTERNAL MEDICINE

## 2021-11-30 PROCEDURE — 99204 OFFICE O/P NEW MOD 45 MIN: CPT | Performed by: INTERNAL MEDICINE

## 2021-11-30 RX ORDER — ACETAMINOPHEN 160 MG
2000 TABLET,DISINTEGRATING ORAL DAILY
COMMUNITY

## 2021-11-30 NOTE — PROGRESS NOTES
"    [facility] - CARDIOLOGY  New Patient Initial Outpatient Evaluation    Primary Care Physician: Jesusita Patterson APRN    Subjective     Chief Complaint: Chest pressure    History of Present Illness    Ms. Gamboa is a 35-year-old female referred to me by MCKAYLA Roberts, for chest pressure. The note with Ms. Patterson from 09/03/2021 mentions the patient has been feeling chest pressure, but no pain intermittently since receiving her first COVID vaccine the last week of 08/2021. She denied any change in breathing, and reported she had been taking Xanax twice a day as needed for the past 10 years. She also previously followed with a cardiologist in [place], [place], (Dr. Bentley). I did review his last visit note with her, which was a telemedicine visit from 09/13/2021, in which he describes the same chest pressure mentioned in the Pinky Patterson' note. He also mentions that the patient was followed by him for palpitations with a Holter monitor that was \"noncontributory,\" and also then had a [employer] loop recorder implanted in 08/2019. He mentioned in his plan that the loop recorder had simply shown sinus tachycardia with occasional PVCs, and that the battery in the loop recorder was dead, so was going to discuss potential explant in the future. There were no other specific mentions of any plans regarding her chest pressure.    Ms. Gamboa reports that her symptoms started with fast racing of her heart in which she could feel the palpitations. She had been wearing a Holter monitor, and after it was removed, a few days later stating she felt like she was having a heart attack with a fast heart rate of 140 to 150, accompanied with diaphoresis, and nausea. This episode lasted about 10 minutes. Once arriving to the hospital, she was informed that everything was fine, and her heart rate had normalized by then (she figured as much, as her symptoms had resolved prior to arrival).    She tells me that she was told by her " prior cardiologist that her loop recorder was showing short bursts of SVT. She denies ever having another episode like the one that took her to the emergency room in terms of severity, but did continue to have shorter lived episodes of palpitations that were otherwise similar. She reports that she has had episodes of chest pressure, palpitations, and not feeling well. She adds that she had episodes of diaphoresis waking up in the middle of the night with her heart pounding. She then would hit the monitor button, send it, and then call the office the next day, and would be informed that they did not see anything during that period of time on the monitor. These symptoms that she was experiencing were similar to the symptoms that brought her to the ER.     She adds that she went back for her regular checkup a couple of months ago, and the week or two prior, she had been having chest discomfort. She informed her physician who said he was not worried about it. He instructed her to come back to have the recorder removed, leave it out for 6 months, put another one back in, and watch her for another couple of years.     She adds that this past week, she has had chest discomfort, and it feels like an achy burning with 1 episode of sharp, shooting pain in the center of her chest that does not radiate. She states that the chest discomfort lasts for minutes, and then she rests, and sometimes it resolves. She adds that the chest pain is intermittent, and is not predictable. The chest pain occurs when she is active or at rest. The episode of SVT that brought her to the ER lasted about 10 minutes. She endorses that the chest pain has been ongoing since her COVID-19 vaccine in 08/2021 until now. The chest discomfort occurs about 4 days per week, and is becoming more frequent. The episodes themselves are not worsening or lasting longer. These symptoms are not associated with nausea. She adds that she has had shortness of breath  before the episodes began. She reports that she has underlying GI issues with GERD. Her chest pain symptoms, and palpitations do not occur within an hour of eating.     The last visit with her previous cardiologist he informed her that she was having episodes of SVT, and he did not know why she was having these episodes.    She has not been started on any medication for the SVT, and is not interested in starting a medication.    Her last echocardiogram was 2 years ago, so she estimates.    Review of Systems   Constitutional: Negative for malaise/fatigue.   Cardiovascular: Positive for palpitations. Negative for chest pain, claudication, dyspnea on exertion, leg swelling, near-syncope, orthopnea, paroxysmal nocturnal dyspnea and syncope.        Chest pressure.   Respiratory: Positive for shortness of breath.    Hematologic/Lymphatic: Does not bruise/bleed easily.        Otherwise complete ROS reviewed and negative except as mentioned in the HPI.      Past Medical History:   Past Medical History:   Diagnosis Date   • Abnormal ECG    • Allergic 2005   • Anxiety 2010   • Arrhythmia    • Asthma, extrinsic    • GERD (gastroesophageal reflux disease)    • Globus sensation 6/15/2021   • Headache 2002   • Intractable nausea and vomiting 6/15/2021   • Mild intermittent asthma without complication 6/15/2021   • Tachycardia        Past Surgical History:  Past Surgical History:   Procedure Laterality Date   • ADENOIDECTOMY     • APPENDECTOMY     • CARDIAC SURGERY      loop recorder   • CHOLECYSTECTOMY     • COLONOSCOPY  2015   • ENDOMETRIAL ABLATION     • ENDOSCOPY N/A 7/26/2021    Procedure: ESOPHAGOGASTRODUODENOSCOPY WITH ANESTHESIA;  Surgeon: Keegan Pope DO;  Location: [facility] ENDOSCOPY;  Service: Gastroenterology;  Laterality: N/A;  pre GERD  post  jesus BLOCK   • HYSTERECTOMY     • SUBTOTAL HYSTERECTOMY  2016   • TONSILLECTOMY         Family History: family history includes COPD in her maternal grandmother;  Cancer in her maternal grandfather; Heart disease in her maternal grandmother; No Known Problems in her father and mother; Stroke in her maternal grandfather. She was adopted.    Social History:  reports that she has never smoked. She has never used smokeless tobacco. She reports current alcohol use. She reports that she does not use drugs.    Medications:  Prior to Admission medications    Medication Sig Start Date End Date Taking? Authorizing Provider   albuterol (PROVENTIL) (2.5 MG/3ML) 0.083% nebulizer solution INHALE 3 ML PO Q 4 - 6 H PRM   Yes Albaro Kenyon MD   albuterol sulfate  (90 Base) MCG/ACT inhaler Inhale 2 puffs Every 4 (Four) Hours As Needed for Wheezing. 9/3/21  Yes Jesusita Patterson APRN   ALPRAZolam (XANAX) 0.5 MG tablet Take 1 tablet by mouth twice daily as needed for anxiety 10/14/21  Yes Jesusita Patterson APRN   Ascorbic Acid (DEANN-C PO) Take  by mouth.   Yes Albaro Kenyon MD   Cholecalciferol (Vitamin D3) 50 MCG (2000 UT) capsule Take 2,000 Units by mouth Daily.   Yes Albaro Kenyon MD   fluticasone-salmeterol (ADVAIR HFA) 115-21 MCG/ACT inhaler Inhale 2 puffs 2 (Two) Times a Day for 30 days. 9/23/21 11/30/21 Yes Princess Rico APRN   Multiple Vitamins-Minerals (ZINC PO) Take  by mouth.   Yes ProviderAlbaro MD   ondansetron ODT (ZOFRAN-ODT) 8 MG disintegrating tablet Place 1 tablet every 8 hours by translingual route as needed. 5/20/21  Yes Albaro Kenyon MD   multivitamin with minerals tablet tablet Take 1 tablet by mouth Daily.  11/30/21 Yes Albaro Kenyon MD   predniSONE (DELTASONE) 20 MG tablet 2 pills daily for 3 days then 1 pill daily for 3 days then 1/2 pill daily for 4 days then stop. 10/7/21 11/30/21  Jesusita Patterson APRN     Allergies:  Allergies   Allergen Reactions   • Compazine [Prochlorperazine] Palpitations   • Iodinated Diagnostic Agents Anaphylaxis   • Sulfa Antibiotics Angioedema, Other (See  "Comments) and Rash     breathing difficulty         Objective     Vital Signs: /65   Pulse 87   Ht 170.2 cm (67\")   Wt 54.4 kg (120 lb)   SpO2 99%   BMI 18.79 kg/m²     Vitals and nursing note reviewed.   Constitutional:       General: Not in acute distress.     Appearance: Not in distress.   Neck:      Vascular: No JVD or JVR. JVD normal.   Pulmonary:      Effort: Pulmonary effort is normal.      Breath sounds: Normal breath sounds.   Cardiovascular:      Normal rate. Regular rhythm.      Murmurs: There is no murmur.      No gallop. No rub.   Pulses:     Intact distal pulses.   Skin:     General: Skin is warm and dry.   Neurological:      Mental Status: Alert, oriented to person, place, and time and oriented to person, place and time.         Results Reviewed:      ECG 12 Lead    Date/Time: 11/30/2021 3:33 PM  Performed by: Roel Fall MD  Authorized by: Roel Fall MD   Comparison: compared with previous ECG from 9/17/2021  Comparison to previous ECG: PVC is no longer noted.  Rhythm: sinus rhythm  BPM: 87    Clinical impression: normal ECG              External records reviewed:  -Progress note from last visit with Dr. Bentley on 9/13/2021, in which I noted the findings reported above in HPI (namely, that he labeled her as having sinus tachycardia and occasional PVCs), but there is no mention of SVT.      Assessment / Plan        Problem List Items Addressed This Visit        Cardiac and Vasculature    Tachycardia - Primary    Overview     Sinus tachycardia and occasional PVCs noted on loop records implanted by cardiologist in [place], [place], Aug '19         Relevant Orders    Adult Transthoracic Echo Complete W/ Cont if Necessary Per Protocol    PSVT (paroxysmal supraventricular tachycardia) (HCC)       Symptoms and Signs    Chest pressure    Relevant Orders    Treadmill Stress Test      Other Visit Diagnoses     Palpitations        Relevant Orders    Adult Transthoracic Echo Complete W/ Cont if " Necessary Per Protocol        1. Palpitations: The patient reports a long-standing history of intermittent palpitations for which she has previously been evaluated with a Holter monitor, and then a loop monitor. Full records are not available today for my review, but the patient does use the term SVT, and says that her prior cardiologist had told her that this was what was shown on her monitor (though the one note I have from him today does not reflect that). She certainly does describe symptoms that would be compatible with this. However, she was never offered any sort of suppressive or abortive therapies.  - I did review the pathophysiology of SVT, and treatment strategies.  - I provided her educational materials, and specifically emphasized vagal maneuvers.  - We discussed daily beta-blocker suppressive therapy, EP referral for ablation, and conservative measures. For now, we will continue to employ conservative measures as she is certainly not interested in a daily suppressive therapy like a beta-blocker. However, if symptoms continue in a lifestyle-limiting way, then may consider EP referral of an ablation.  - We will also need to check an echocardiogram to ensure the heart is structurally normal.  -There are no obvious identified triggers, but did discuss trigger avoidance in general    2. Chest pressure. The patient is low risk for obstructive coronary disease, but is having chest pressure more days than not over the last several weeks that seems to be worsening.  - Baseline 12 lead EKG is completely normal today, so we will perform a treadmill stress test for further investigation, and risk factor stratification.    3. Presence of implantable loop recorder: She has a [employer] Confirm Rx that has no battery life remaining, and requests this be removed. I told her we would address this once we have her other tests back.     Follow-up to be determined after the above.     Thank you for the  referral.      Transcribed from ambient dictation for Roel Fall MD by Lorelei Kilgore.  11/30/21   16:44 CST    Patient verbalized consent to the visit recording.   I Roel Fall MD have personally performed the services described in this document as scribed by the above individual, and it is both accurate and complete.   I have edited each component as needed.    Roel Fall MD  11/30/2021  20:33 CST    Addendum: By mail, we received a large stack of papers from [facility] including serial transmissions received from her [employer] confirm loop monitor.  Salient findings per my independent review of this data outlined below:  -On October 16, 2020 there was a 10-second episode of tachycardia that appears to be a regular narrow complex tachycardia with a rate of approximately 150 bpm.  -The monitor reported an episode that it labeled his atrial fibrillation for 8 minutes on October 16, 2020 but there are clearly P waves so this was not actually atrial fibrillation  -Transmission from September 3, 2020 shows 13 occurrences of tachycardia since the prior transmission.  These ranged in duration from 3 seconds up to 36 seconds, with heart rate usually always right around 160 bpm.  -Transition from August 21, 2020 showed 7 episodes of short-lived tachycardia, up to 37 seconds, again with rates usually in the 160s-170s.  -Similar findings with 3 occurrences on the July 26, 2020 reading.  Symptoms were reported then and a tracing was provided that appears to have regular narrow complex tachycardia with a rate of approximate 160 bpm.  -Most of these tachycardia episodes do appear to be sinus tachycardia, though I cannot exclude an atrial tachycardia as a source for some as the monitor is not sensitive enough to detect changes in morphology of P waves    Roel Fall MD  15:18 CST  12/23/21

## 2021-11-30 NOTE — PATIENT INSTRUCTIONS
Supraventricular Tachycardia, Adult  Supraventricular tachycardia (SVT) is a type of abnormal heart rhythm. It causes the heart to beat very quickly and then return to a normal speed.  A normal resting heart rate is  beats per minute. During an episode of SVT, your heart rate may be higher than 150 beats per minute. Episodes of SVT can be frightening, but they are usually not dangerous. However, if episodes happen several times per day or last longer than a few seconds, they may lead to heart failure.  What are the causes?    Usually, a normal heartbeat starts when an area called the sinoatrial node releases an electrical signal. In SVT, other areas of the heart send out electrical signals that interfere with the signal from the sinoatrial node. It is not known why some people get SVT and others do not.  What increases the risk?  You are more likely to develop this condition if you are:  · 12-30 years old.  · A woman.  The following factors may make you more likely to develop this condition:  · Stress.  · Tiredness.  · Smoking.  · Stimulant drugs, such as cocaine and methamphetamine.  · Alcohol.  · Caffeine.  · Pregnancy.  · Anxiety.  What are the signs or symptoms?  Symptoms of this condition include:  · A pounding heart.  · A feeling that the heart is skipping beats (palpitations).  · Weakness.  · Shortness of breath.  · Tightness or pain in your chest.  · Light-headedness.  · Anxiety.  · Dizziness.  · Sweating.  · Nausea.  · Fainting.  · Fatigue or tiredness.  A mild episode may not cause symptoms.  How is this diagnosed?  This condition may be diagnosed based on:  · Your symptoms.  · A physical exam.  ? If you have an episode of SVT during the exam, the health care provider may be able to diagnose SVT by listening to your heart and feeling your pulse.  · Tests. These may include:  ? An electrocardiogram (ECG). This test is done to check for problems with electrical activity in the heart.  ? A Holter  monitor or event monitor test. This test involves wearing a portable device that monitors your heart rate over time.  ? An echocardiogram. This test involves taking an image of your heart using sound waves. It is done to rule out other causes of a fast heart rate.  ? Blood tests.  How is this treated?  This condition may be treated with:  · Vagal nerve stimulation. The treatment involves stimulating your vagus nerve, which slows down the heart. It is often the first and only treatment that is needed for this condition. Work with your health care provider to find which one works best for you. Ways to do this treatment include:  ? Holding your breath and pushing, as though you are having a bowel movement.  ? Massaging an area on one side of your neck, below your jaw. Do not try this yourself. Only a health care provider should do this. If done the wrong way, it can lead to a stroke.  ? Bending forward with your head between your legs.  ? Coughing while bending forward with your head between your legs.  ? Closing your eyes and massaging your eyeballs. A health care provider should guide you through this method before you try it on your own.  · Medicines that prevent attacks.  · Medicine to stop an attack. The medicine is given through an IV at the hospital.  · A small electric shock (cardioversion) that stops an attack. Before you get the shock, you will get medicine to make you fall asleep.  · Radiofrequency ablation. In this procedure, a small, thin tube (catheter) is used to send radiofrequency energy to the area of tissue that is causing the rapid heartbeats. The energy kills the cells and helps your heart keep a normal rhythm. You may have this treatment if you have symptoms of SVT often.  If you do not have symptoms, you may not need treatment.  Follow these instructions at home:  Stress  · Avoid stressful situations when possible.  · Find healthy ways of managing stress that work for you. Some healthy ways to  manage stress include:  ? Taking part in relaxing activities, such as yoga, meditation, or being out in nature.  ? Listening to relaxing music.  ? Practicing relaxation techniques, such as deep breathing.  ? Leading a healthy lifestyle. This involves getting plenty of sleep, exercising, and eating a balanced diet.  ? Attending counseling or talk therapy with a mental health professional.  Lifestyle    · Try to get at least 7 hours of sleep each night.  · Do not use any products that contain nicotine or tobacco, such as cigarettes, e-cigarettes, and chewing tobacco. If you need help quitting, ask your health care provider.  · Be aware of how alcohol affects your condition. If alcohol:  ? Triggers episodes of SVT, do not drink alcohol.  ? Does not seem to trigger episodes, limit alcohol intake to no more than 1 drink a day for nonpregnant women and 2 drinks a day for men. Be aware of how much alcohol is in your drink. In the [place] one drink equals one 12 oz bottle of beer (355 mL), one 5 oz glass of wine (148 mL), or one 1½ oz glass of hard liquor (44 mL).  · Be aware of how caffeine affects your condition. If caffeine:  ? Triggers episodes of SVT, do not eat, drink, or use anything with caffeine in it.  ? Does not seem to trigger episodes, consume caffeine in moderation.  · Do not use stimulant drugs. If you need help quitting, talk with your health care provider.    General instructions  · Maintain a healthy weight.  · Exercise regularly. Ask your health care provider to suggest some good activities for you. Aim for one or a combination of the following:  ? 150 minutes per week of moderate exercise, such as walking or yoga.  ? 75 minutes per week of vigorous exercise, such as running or swimming.  · Perform vagus nerve stimulation as directed by your health care provider.  · Take over-the-counter and prescription medicines only as told by your health care provider.  · Keep all follow-up visits as told by your health  care provider. This is important.  Contact a health care provider if:  · You have episodes of SVT more often than before.  · Episodes of SVT last longer than before.  · Vagus nerve stimulation is no longer helping.  · You have new symptoms.  Get help right away if:  · You have chest pain.  · Your symptoms get worse.  · You have trouble breathing.  · You have an episode of SVT that lasts longer than 20 minutes.  · You faint.  These symptoms may represent a serious problem that is an emergency. Do not wait to see if the symptoms will go away. Get medical help right away. Call your local emergency services (911 in the [place] Do not drive yourself to the hospital.  Summary  · Supraventricular tachycardia (SVT) is a type of abnormal heart rhythm.  · During an episode of SVT, your heart rate may be higher than 150 beats per minute.  · Treatment depends on frequency of occurrence and symptoms experienced.  This information is not intended to replace advice given to you by your health care provider. Make sure you discuss any questions you have with your health care provider.  Document Revised: 11/05/2019 Document Reviewed: 11/05/2019  [employer] Patient Education © 2021 [employer]

## 2021-12-02 ENCOUNTER — OFFICE VISIT (OUTPATIENT)
Dept: INTERNAL MEDICINE | Facility: CLINIC | Age: 36
End: 2021-12-02

## 2021-12-02 ENCOUNTER — TELEPHONE (OUTPATIENT)
Dept: GASTROENTEROLOGY | Facility: CLINIC | Age: 36
End: 2021-12-02

## 2021-12-02 VITALS
WEIGHT: 117 LBS | SYSTOLIC BLOOD PRESSURE: 111 MMHG | RESPIRATION RATE: 18 BRPM | HEIGHT: 67 IN | BODY MASS INDEX: 18.36 KG/M2 | DIASTOLIC BLOOD PRESSURE: 77 MMHG | TEMPERATURE: 97.6 F | OXYGEN SATURATION: 99 % | HEART RATE: 92 BPM

## 2021-12-02 DIAGNOSIS — Z00.00 ANNUAL PHYSICAL EXAM: Primary | ICD-10-CM

## 2021-12-02 DIAGNOSIS — R13.14 PHARYNGOESOPHAGEAL DYSPHAGIA: ICD-10-CM

## 2021-12-02 DIAGNOSIS — F41.9 ANXIETY: ICD-10-CM

## 2021-12-02 DIAGNOSIS — Z11.59 ENCOUNTER FOR HEPATITIS C SCREENING TEST FOR LOW RISK PATIENT: ICD-10-CM

## 2021-12-02 DIAGNOSIS — R11.2 INTRACTABLE VOMITING WITH NAUSEA: ICD-10-CM

## 2021-12-02 DIAGNOSIS — R07.89 CHEST PRESSURE: ICD-10-CM

## 2021-12-02 PROCEDURE — 99395 PREV VISIT EST AGE 18-39: CPT | Performed by: NURSE PRACTITIONER

## 2021-12-02 RX ORDER — ALPRAZOLAM 0.5 MG/1
0.5 TABLET ORAL 2 TIMES DAILY PRN
Qty: 60 TABLET | Refills: 0 | Status: SHIPPED | OUTPATIENT
Start: 2021-12-02 | End: 2022-03-03 | Stop reason: SDUPTHER

## 2021-12-02 RX ORDER — ONDANSETRON 8 MG/1
8 TABLET, ORALLY DISINTEGRATING ORAL EVERY 8 HOURS PRN
Qty: 90 TABLET | Refills: 5 | Status: SHIPPED | OUTPATIENT
Start: 2021-12-02

## 2021-12-02 NOTE — PROGRESS NOTES
Answers for HPI/ROS submitted by the patient on 12/2/2021  Please describe your symptoms.: Follow up appt  Have you had these symptoms before?: Yes  How long have you been having these symptoms?: Greater than 2 weeks  What is the primary reason for your visit?: Other            Subjective     Chief Complaint   Patient presents with   • Annual Exam       History of Present Illness  Pt comes in today for her annual exam. States she is still having some GI issues. She continues with dysphagia and has had an extensive workup at [facility] including esophageal manometry and EGD. She has had an EGD with Dr. Pope recently. States she has been having dysphagia for the past 5-6 years. She states she feels like she is suffocating with eating. She continues to lose weight. She continues to vomit post prandially. No diarrhea. She takes Zofran frequently. She has tried Reglan in the past without improvement. Extensive record review shows she was seen by Dr. Kwon in 2017 at [facility]. 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 He actually recommended Effexor for the visceral hypersensitivity, but she states it did not help. Reviewed esophageal manometry as well.     She has been seen by Dr. Fall for tachycardia. His notes were reviewed. She is having a stress test Monday.     Review of Systems   Constitutional: Positive for appetite change and fatigue.   Eyes: Negative for visual disturbance.   Respiratory: Positive for cough.    Cardiovascular: Positive for chest pain and palpitations.   Gastrointestinal: Positive for abdominal pain and constipation.   Neurological: Negative for headaches.      Otherwise complete ROS reviewed and negative except as mentioned in the HPI.    Past Medical History:   Past Medical History:   Diagnosis Date   • Abnormal ECG    • Allergic 2005   • Anxiety 2010   • Arrhythmia    • Asthma, extrinsic    • GERD (gastroesophageal reflux  disease)    • Globus sensation 6/15/2021   • Headache 2002   • Intractable nausea and vomiting 6/15/2021   • Mild intermittent asthma without complication 6/15/2021   • Tachycardia      Past Surgical History:  Past Surgical History:   Procedure Laterality Date   • ADENOIDECTOMY     • APPENDECTOMY     • CARDIAC SURGERY      loop recorder   • CHOLECYSTECTOMY     • COLONOSCOPY  2015   • ENDOMETRIAL ABLATION     • ENDOSCOPY N/A 7/26/2021    Procedure: ESOPHAGOGASTRODUODENOSCOPY WITH ANESTHESIA;  Surgeon: Keegan Pope DO;  Location: [facility] ENDOSCOPY;  Service: Gastroenterology;  Laterality: N/A;  pre GERD  post  jesusita BLOCK   • HYSTERECTOMY     • SUBTOTAL HYSTERECTOMY  2016   • TONSILLECTOMY       Social History:  reports that she has never smoked. She has never used smokeless tobacco. She reports current alcohol use. She reports that she does not use drugs.    Family History: family history includes COPD in her maternal grandmother; Cancer in her maternal grandfather; Heart disease in her maternal grandmother; No Known Problems in her father and mother; Stroke in her maternal grandfather. She was adopted.       Allergies:  Allergies   Allergen Reactions   • Compazine [Prochlorperazine] Palpitations   • Iodinated Diagnostic Agents Anaphylaxis   • Sulfa Antibiotics Angioedema, Other (See Comments) and Rash     breathing difficulty       Medications:  Prior to Admission medications    Medication Sig Start Date End Date Taking? Authorizing Provider   albuterol (PROVENTIL) (2.5 MG/3ML) 0.083% nebulizer solution INHALE 3 ML PO Q 4 - 6 H PRM    Provider, MD Albaro   albuterol sulfate  (90 Base) MCG/ACT inhaler Inhale 2 puffs Every 4 (Four) Hours As Needed for Wheezing. 9/3/21   Jesusita Patterson APRN   ALPRAZolam (XANAX) 0.5 MG tablet Take 1 tablet by mouth twice daily as needed for anxiety 10/14/21   Jesusita Patterson APRN   Ascorbic Acid (DEANN-C PO) Take  by mouth.    Provider,  "MD Albaro   Cholecalciferol (Vitamin D3) 50 MCG (2000 UT) capsule Take 2,000 Units by mouth Daily.    Provider, MD Albaro   fluticasone-salmeterol (ADVAIR HFA) 115-21 MCG/ACT inhaler Inhale 2 puffs 2 (Two) Times a Day for 30 days. 9/23/21 11/30/21  Princess Rico APRN   Multiple Vitamins-Minerals (ZINC PO) Take  by mouth.    Provider, MD Albaro   ondansetron ODT (ZOFRAN-ODT) 8 MG disintegrating tablet Place 1 tablet every 8 hours by translingual route as needed. 5/20/21   ProviderAlbaro MD       Objective     Vital Signs: /77   Pulse 92   Temp 97.6 °F (36.4 °C) (Skin)   Resp 18   Ht 170.2 cm (67\")   Wt 53.1 kg (117 lb)   SpO2 99%   BMI 18.32 kg/m²   Physical Exam  Vitals reviewed.   Constitutional:       Appearance: She is well-developed.      Comments: thin   HENT:      Head: Normocephalic and atraumatic.   Eyes:      Pupils: Pupils are equal, round, and reactive to light.   Neck:      Vascular: No JVD.   Cardiovascular:      Rate and Rhythm: Normal rate and regular rhythm.   Pulmonary:      Effort: Pulmonary effort is normal.      Breath sounds: Normal breath sounds.   Abdominal:      General: Bowel sounds are normal.      Palpations: Abdomen is soft.      Tenderness: There is abdominal tenderness ( epigastric).   Musculoskeletal:         General: No deformity.      Cervical back: Normal range of motion and neck supple.   Lymphadenopathy:      Cervical: No cervical adenopathy.   Skin:     General: Skin is warm and dry.   Neurological:      Mental Status: She is alert and oriented to person, place, and time.   Psychiatric:         Behavior: Behavior normal.         Thought Content: Thought content normal.         Judgment: Judgment normal.         Patient's Body mass index is 18.32 kg/m². indicating that she is within normal range (BMI 18.5-24.9). No BMI management plan needed..      Results Reviewed:  Glucose   Date Value Ref Range Status   09/17/2021 96 65 - 99 mg/dL Final "     BUN   Date Value Ref Range Status   09/17/2021 5 (L) 6 - 20 mg/dL Final     Creatinine   Date Value Ref Range Status   09/17/2021 0.56 (L) 0.57 - 1.00 mg/dL Final     Sodium   Date Value Ref Range Status   09/17/2021 139 136 - 145 mmol/L Final     Potassium   Date Value Ref Range Status   09/17/2021 3.9 3.5 - 5.2 mmol/L Final     Chloride   Date Value Ref Range Status   09/17/2021 102 98 - 107 mmol/L Final     CO2   Date Value Ref Range Status   09/17/2021 27.0 22.0 - 29.0 mmol/L Final     Calcium   Date Value Ref Range Status   09/17/2021 9.3 8.6 - 10.5 mg/dL Final     ALT (SGPT)   Date Value Ref Range Status   09/17/2021 32 1 - 33 U/L Final     AST (SGOT)   Date Value Ref Range Status   09/17/2021 30 1 - 32 U/L Final     WBC   Date Value Ref Range Status   09/17/2021 4.44 3.40 - 10.80 10*3/mm3 Final   07/22/2021 4.7 3.4 - 10.8 x10E3/uL Final   05/24/2018 5.3 4.0 - 10.5 10*3/uL Final     Hematocrit   Date Value Ref Range Status   09/17/2021 41.6 34.0 - 46.6 % Final   05/24/2018 40.0 37.0 - 47.0 % Final     Platelets   Date Value Ref Range Status   09/17/2021 255 140 - 450 10*3/mm3 Final   05/24/2018 295 150 - 450 10*3/uL Final     Triglycerides   Date Value Ref Range Status   12/02/2021 57 0 - 149 mg/dL Final     HDL Cholesterol   Date Value Ref Range Status   12/02/2021 55 >39 mg/dL Final     LDL Chol Calc (NIH)   Date Value Ref Range Status   12/02/2021 70 0 - 99 mg/dL Final         Assessment / Plan     Assessment/Plan:  Diagnoses and all orders for this visit:    1. Annual physical exam (Primary)  -     Lipid Panel    2. Intractable vomiting with nausea  -     FL Upper GI With Air Contrast & KUB; Future  -     Vitamin D 25 Hydroxy  -     ondansetron ODT (ZOFRAN-ODT) 8 MG disintegrating tablet; Place 1 tablet on the tongue Every 8 (Eight) Hours As Needed for Nausea or Vomiting.  Dispense: 90 tablet; Refill: 5    3. Chest pressure  -     FL Upper GI With Air Contrast & KUB; Future  -     Vitamin D 25  Hydroxy    4. Pharyngoesophageal dysphagia  -     FL Upper GI With Air Contrast & KUB; Future    5. Encounter for hepatitis C screening test for low risk patient  -     Hepatitis C Antibody    6. Anxiety  -     ALPRAZolam (XANAX) 0.5 MG tablet; Take 1 tablet by mouth 2 (Two) Times a Day As Needed for Anxiety. for anxiety  Dispense: 60 tablet; Refill: 0    Dicussed with DR. Pope. He will investigate further.       Return in about 3 months (around 3/2/2022). unless patient needs to be seen sooner or acute issues arise.    Code Status: Full.    I have discussed the patient results/orders and and plan/recommendation with them at today's visit.      Jesusita Patterson, APRN   12/02/2021

## 2021-12-04 LAB
25(OH)D3+25(OH)D2 SERPL-MCNC: 36.1 NG/ML (ref 30–100)
CHOLEST SERPL-MCNC: 137 MG/DL (ref 100–199)
HCV AB S/CO SERPL IA: <0.1 S/CO RATIO (ref 0–0.9)
HDLC SERPL-MCNC: 55 MG/DL
LDLC SERPL CALC-MCNC: 70 MG/DL (ref 0–99)
TRIGL SERPL-MCNC: 57 MG/DL (ref 0–149)
VLDLC SERPL CALC-MCNC: 12 MG/DL (ref 5–40)

## 2021-12-06 ENCOUNTER — HOSPITAL ENCOUNTER (OUTPATIENT)
Dept: CARDIOLOGY | Facility: HOSPITAL | Age: 36
Discharge: HOME | End: 2021-12-06
Admitting: INTERNAL MEDICINE

## 2021-12-06 VITALS
WEIGHT: 117.06 LBS | HEART RATE: 111 BPM | SYSTOLIC BLOOD PRESSURE: 103 MMHG | DIASTOLIC BLOOD PRESSURE: 75 MMHG | BODY MASS INDEX: 18.37 KG/M2 | HEIGHT: 67 IN

## 2021-12-06 DIAGNOSIS — R07.89 CHEST PRESSURE: ICD-10-CM

## 2021-12-06 LAB
BH CV STRESS BP STAGE 1: NORMAL
BH CV STRESS BP STAGE 2: NORMAL
BH CV STRESS BP STAGE 3: NORMAL
BH CV STRESS BP STAGE 4: NORMAL
BH CV STRESS DURATION MIN STAGE 1: 3
BH CV STRESS DURATION MIN STAGE 2: 3
BH CV STRESS DURATION MIN STAGE 3: 3
BH CV STRESS DURATION MIN STAGE 4: 0
BH CV STRESS DURATION SEC STAGE 1: 0
BH CV STRESS DURATION SEC STAGE 2: 0
BH CV STRESS DURATION SEC STAGE 3: 0
BH CV STRESS DURATION SEC STAGE 4: 25
BH CV STRESS GRADE STAGE 1: 10
BH CV STRESS GRADE STAGE 2: 12
BH CV STRESS GRADE STAGE 3: 14
BH CV STRESS GRADE STAGE 4: 16
BH CV STRESS HR STAGE 1: 141
BH CV STRESS HR STAGE 2: 166
BH CV STRESS HR STAGE 3: 176
BH CV STRESS HR STAGE 4: 181
BH CV STRESS METS STAGE 1: 5
BH CV STRESS METS STAGE 2: 7.5
BH CV STRESS METS STAGE 3: 10
BH CV STRESS METS STAGE 4: 13.5
BH CV STRESS PROTOCOL 1: NORMAL
BH CV STRESS RECOVERY BP: NORMAL MMHG
BH CV STRESS RECOVERY HR: 118 BPM
BH CV STRESS SPEED STAGE 1: 1.7
BH CV STRESS SPEED STAGE 2: 2.5
BH CV STRESS SPEED STAGE 3: 3.4
BH CV STRESS SPEED STAGE 4: 4.2
BH CV STRESS STAGE 1: 1
BH CV STRESS STAGE 2: 2
BH CV STRESS STAGE 3: 3
BH CV STRESS STAGE 4: 4
MAXIMAL PREDICTED HEART RATE: 183 BPM
PERCENT MAX PREDICTED HR: 98.91 %
STRESS BASELINE BP: NORMAL MMHG
STRESS BASELINE HR: 111 BPM
STRESS PERCENT HR: 116 %
STRESS POST ESTIMATED WORKLOAD: 13.5 METS
STRESS POST EXERCISE DUR MIN: 9 MIN
STRESS POST EXERCISE DUR SEC: 25 SEC
STRESS POST PEAK BP: NORMAL MMHG
STRESS POST PEAK HR: 181 BPM
STRESS TARGET HR: 156 BPM

## 2021-12-06 PROCEDURE — 93018 CV STRESS TEST I&R ONLY: CPT | Performed by: INTERNAL MEDICINE

## 2021-12-06 PROCEDURE — 93017 CV STRESS TEST TRACING ONLY: CPT

## 2021-12-08 PROBLEM — K21.9 GASTROESOPHAGEAL REFLUX DISEASE: Chronic | Status: ACTIVE | Noted: 2021-07-16

## 2021-12-08 NOTE — PROGRESS NOTES
Chief Complaint  MILD INTERMITTENT ASTHMA and Shortness of Breath    Subjective    History of Present Illness {CC  Problem List  Visit Diagnosis   Encounters  Notes  Medications  Labs  Result Review Imaging  Media     Mago Gamboa presents to [facility] for:    Management of asthma.  She is a never smoker.  She has dogs in the home.  PFTs in March 2020 normal.  She has frequent shortness of breath.  She has tried Spiriva, Combivent, ProAir and Advair.  Most recently she is on Advair 115.  She did not think that was helping much either.  She reduced it to once a day and then ultimately off.  She cannot tell any difference in her breathing symptoms since discontinuing it.  She keeps an albuterol in her nebulizer available to use although she does not think those helped much either. She requested overnight oximetry for some nocturnal complaints.  It was essentially normal.      She had some elevated heart rate issues noted.  This is been ongoing issue for her.  She [place] specialist [place].  She is followed by cardiology locally for this as well.  She was seen by Dr. Fall most recently.  He offered a beta-blocker which she declined.  She indicates he is awaiting records from her previous cardiologist before removing her loop recorder.  She had a stress test which was normal.  She is awaiting an echo.  She indicates they are scheduling is out until January.  She said she anticipates if the echo is okay they will remove the current loop recorder and will not place an additional recorder unless symptoms warrant.  She indicates there was some discussion about seeing an electrophysiologist although she is not certain about that.  Symptoms have been a little bit better.      She has allergy issues.  Last office visit she was taking fluticasone and Zyrtec for these with persistent complaints.  I added azelastine.  I ordered an allergy  "panel.  She requested a referral to Dr. Emmanuel which I ordered as well.  Allergen panel was negative.  She has not seen him yet.  She is no longer taking any of the allergy medications.  Currently she is having none of the previous allergy complaints.      She has been evaluated by ENT in the past for globulus sensation and pain in her throat for greater than 5 years.  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 They recommended treating reflux, fluticasone, Mucinex and assistance from speech and physical therapy.  She indicates the speech therapist indicated she thought she may have a problem with her vagus nerve which could have been injured during her accident.  She has not gone back to see them yet.  She has had multiple EGDs, H. pylori testing, pH testing and a swallow study.  She reports all of these to be negative in the past.  Old records obtained and reviewed actually identified an endoscopy pathology to report acute esophagitis as well as signs of chronic gastritis.  That provider recommended she take a PPI twice a day indefinitely.  At her most recent appointment she was not taking this due to cost and lack of improvement.  Since her evaluation with me she was seen by Dr. Pope.  She underwent an EGD and was told \"it was normal\"  They recommended she resume the PPI twice per day and MiraLAX she reported only having a bowel movement every 2 to 3 days.  They also recommended she discontinue the Zofran as this could be affecting her movements.  She followed up with her primary care provider who also ordered a barium swallow.  Barium swallow identified esophagitis with reflux as well as gastroparesis.  She is uncertain what to do about the inconsistencies.  Currently she is taking a PPI once a day in the morning and an H2 blocker at bedtime.  She did discontinue the Zofran.  She started taking the MiraLAX.  Unfortunately she had too numerous to count bowel movements and discontinued it.  "     She believes her primary issue right now is her GI complaints.  She has been waking up in the night with burning in her throat and vomiting bile.  Following this she has breathing complications which lasts a couple of days.  It is not improved with inhalers.  She does not believe her breathing is related to her asthma.  She is having a hoarse voice as well.  She is considering requesting a referral to a tertiary care facility that can offer treatment for gastroparesis or testing of her vagus nerve.  She is going to discuss this with her PCP.         Prior to Admission medications    Medication Sig Start Date End Date Taking? Authorizing Provider   albuterol (PROVENTIL) (2.5 MG/3ML) 0.083% nebulizer solution INHALE 3 ML PO Q 4 - 6 H PRM    ProviderAlbaro MD   albuterol sulfate  (90 Base) MCG/ACT inhaler Inhale 2 puffs Every 4 (Four) Hours As Needed for Wheezing. 9/3/21   Jesusita Patterson APRN   ALPRAZolam (XANAX) 0.5 MG tablet Take 1 tablet by mouth 2 (Two) Times a Day As Needed for Anxiety. for anxiety 12/2/21   Jesusita Patterson APRN   Ascorbic Acid (DEANN-C PO) Take  by mouth.    ProviderAlbaro MD   Cholecalciferol (Vitamin D3) 50 MCG (2000 UT) capsule Take 2,000 Units by mouth Daily.    ProviderAlbaro MD   fluticasone-salmeterol (ADVAIR HFA) 115-21 MCG/ACT inhaler Inhale 2 puffs 2 (Two) Times a Day for 30 days. 9/23/21 11/30/21  Princess Rico APRN   Multiple Vitamins-Minerals (ZINC PO) Take  by mouth.    ProviderAlbaro MD   ondansetron ODT (ZOFRAN-ODT) 8 MG disintegrating tablet Place 1 tablet on the tongue Every 8 (Eight) Hours As Needed for Nausea or Vomiting. 12/2/21   Jesusita Patterson APRN       Social History     Socioeconomic History   • Marital status:    Tobacco Use   • Smoking status: Never Smoker   • Smokeless tobacco: Never Used   Vaping Use   • Vaping Use: Never used   Substance and Sexual Activity   • Alcohol use: Yes      "Alcohol/week: 0.0 standard drinks     Comment: occasionally   • Drug use: Never   • Sexual activity: Yes     Partners: Male     Birth control/protection: Surgical       Objective   Vital Signs:   /60   Pulse 97   Ht 170.2 cm (67\")   Wt 54.7 kg (120 lb 9.6 oz)   SpO2 99% Comment: ra  BMI 18.89 kg/m²     Physical Exam  Constitutional:       Interventions: Face mask in place.   Cardiovascular:      Rate and Rhythm: Normal rate and regular rhythm.      Heart sounds: No murmur heard.      Pulmonary:      Effort: Pulmonary effort is normal.      Breath sounds: Normal breath sounds.   Musculoskeletal:      Right lower leg: No edema.      Left lower leg: No edema.   Neurological:      Mental Status: She is alert and oriented to person, place, and time.        Result Review :{ Labs  Result Review  Imaging  Med Tab  Media :      My interpretation of the PFT: none for this visit    No results found for this or any previous visit.      My interpretation of imaging:  None for this visit     My interpretation of labs: none for this visit      Assessment and Plan {CC Problem List  Visit Diagnosis  ROS  Review (Popup)  Health Maintenance  Quality  BestPractice  Medications  SmartSets  SnapShot Encounters  Media      Diagnoses and all orders for this visit:    1. Mild intermittent asthma without complication (Primary)  Comments:  Better.  Currently taking no bronchodilators.  Believes her current breathing complaints are secondary to reflux esophagitis    2. Tachycardia  Comments:  Improved.  Awaiting echocardiogram testing.  Keep follow-up with cardiology    3. Gastroesophageal reflux disease without esophagitis  Comments:  Continue PPI in the morning and H2 blocker at bedtime.  If no better or worse increase PPI to twice daily as recommended by GI    4. Gastroparesis  Comments:  Did not like side effect profile of Reglan.  Considering going to tertiary care facility.  Will defer to PCP.  Education " material provided        Patient's Body mass index is 18.89 kg/m². indicating that she is within normal range (BMI 18.5-24.9). No BMI management plan needed..    Princess Rico, APRN  12/23/2021  16:13 CST    Follow Up {Instructions Charge Capture  Follow-up Communications   Return in about 6 months (around 6/23/2022) for FVL with diffusion.    Patient was given instructions and counseling regarding her condition or for health maintenance advice. Please see specific information pulled into the AVS if appropriate.

## 2021-12-10 ENCOUNTER — HOSPITAL ENCOUNTER (OUTPATIENT)
Dept: GENERAL RADIOLOGY | Facility: HOSPITAL | Age: 36
Discharge: HOME | End: 2021-12-10
Admitting: NURSE PRACTITIONER

## 2021-12-10 DIAGNOSIS — R13.14 PHARYNGOESOPHAGEAL DYSPHAGIA: ICD-10-CM

## 2021-12-10 DIAGNOSIS — R07.89 CHEST PRESSURE: ICD-10-CM

## 2021-12-10 DIAGNOSIS — R11.2 INTRACTABLE VOMITING WITH NAUSEA: ICD-10-CM

## 2021-12-10 PROCEDURE — 74246 X-RAY XM UPR GI TRC 2CNTRST: CPT

## 2021-12-10 RX ADMIN — BARIUM SULFATE 240 ML: 960 POWDER, FOR SUSPENSION ORAL at 08:24

## 2021-12-10 RX ADMIN — ANTACID/ANTIFLATULENT 1 PACKET: 380; 550; 10; 10 GRANULE, EFFERVESCENT ORAL at 08:24

## 2021-12-10 RX ADMIN — BARIUM SULFATE 700 MG: 700 TABLET ORAL at 08:24

## 2021-12-10 RX ADMIN — BARIUM SULFATE 120 ML: 980 POWDER, FOR SUSPENSION ORAL at 08:24

## 2021-12-23 ENCOUNTER — OFFICE VISIT (OUTPATIENT)
Dept: PULMONOLOGY | Facility: CLINIC | Age: 36
End: 2021-12-23

## 2021-12-23 VITALS
HEART RATE: 97 BPM | SYSTOLIC BLOOD PRESSURE: 104 MMHG | DIASTOLIC BLOOD PRESSURE: 60 MMHG | OXYGEN SATURATION: 99 % | HEIGHT: 67 IN | WEIGHT: 120.6 LBS | BODY MASS INDEX: 18.93 KG/M2

## 2021-12-23 DIAGNOSIS — K21.9 GASTROESOPHAGEAL REFLUX DISEASE WITHOUT ESOPHAGITIS: Chronic | ICD-10-CM

## 2021-12-23 DIAGNOSIS — K31.84 GASTROPARESIS: ICD-10-CM

## 2021-12-23 DIAGNOSIS — J45.20 MILD INTERMITTENT ASTHMA WITHOUT COMPLICATION: Primary | Chronic | ICD-10-CM

## 2021-12-23 DIAGNOSIS — R00.0 TACHYCARDIA: Chronic | ICD-10-CM

## 2021-12-23 PROCEDURE — 99214 OFFICE O/P EST MOD 30 MIN: CPT | Performed by: NURSE PRACTITIONER

## 2021-12-23 RX ORDER — FAMOTIDINE 20 MG/1
20 TABLET, FILM COATED ORAL NIGHTLY PRN
COMMUNITY
End: 2022-06-23 | Stop reason: ALTCHOICE

## 2021-12-23 NOTE — PATIENT INSTRUCTIONS
Gastric Electrical Stimulation  Gastric electrical stimulation (GES) is a treatment for a condition that affects the muscles in the stomach (gastroparesis). In gastroparesis, the muscles move food from the stomach to the small intestine too slowly or not at all. GES may be needed if medicines and diet changes have not relieved symptoms, especially if nausea and vomiting are severe or happen again and again.  For GES, a small device called a stimulator is implanted through surgery beneath the skin over your abdomen. The device sends out high-frequency, low-energy electrical pulses to stimulate the muscles of your stomach wall. This is done to help improve stomach emptying and reduce your symptoms. For this procedure, the device is implanted through small incisions using a thin telescope (laparoscopic surgery). Two insulated wires from the device (leads) are placed into the muscle wall of your stomach. The ends of the wires, also called electrodes, send the electrical pulses from the device to your muscles. Most people do not feel these pulses.  You may still need to follow a special diet and take medicines after you have a GES device implanted. The device is not implanted forever. It can be removed if it is not working or replaced when the battery wears out in about 5-10 years. Your health care provider will check the device at regular visits and adjust the settings as needed.  Tell your health care provider about:  · Any allergies you have.  · All medicines you are taking, including vitamins, herbs, eye drops, creams, and over-the-counter medicines.  · Any problems you or family members have had with anesthetic medicines.  · Any blood disorders you have.  · Any surgeries you have had.  · Any medical conditions you have.  · Whether you are pregnant or may be pregnant.  What are the risks?  Generally, this is a safe procedure. However, problems may occur, including:  · Infection. The device may need to be  removed.  · Bleeding or a blood clot.  · Allergic reactions to medicines.  · Damage to the stomach.  · Damage to nearby structures or organs.  · Problems from the device, such as:  ? Electrode movement into the stomach. The electrodes may have to be moved.  ? Skin breakdown over the device. The device may have to be removed.  ? Movement of the device beneath the skin. The device may need to be removed or moved.  ? Breakdown of the wires in the device, which may cause electric shock or muscle cramps. The device may need to be removed.  · Failure of the device to reduce your symptoms.  What happens before the procedure?  You may need imaging studies of your stomach to confirm gastroparesis.  Staying hydrated  Follow instructions from your health care provider about hydration, which may include:  · Up to 2 hours before the procedure - you may continue to drink clear liquids, such as water, clear fruit juice, black coffee, and plain tea.    Eating and drinking restrictions  Follow instructions from your health care provider about eating and drinking, which may include:  · 8 hours before the procedure - stop eating heavy meals or foods, such as meat, fried foods, or fatty foods.  · 6 hours before the procedure - stop eating light meals or foods, such as toast or cereal.  · 6 hours before the procedure - stop drinking milk or drinks that contain milk.  · 2 hours before the procedure - stop drinking clear liquids.  Medicines  Ask your health care provider about:  · Changing or stopping your regular medicines. This is especially important if you are taking diabetes medicines or blood thinners.  · Taking medicines such as aspirin and ibuprofen. These medicines can thin your blood. Do not take these medicines unless your health care provider tells you to take them.  · Taking over-the-counter medicines, vitamins, herbs, and supplements.  General instructions  · Do not use any products that contain nicotine or tobacco for at  least 4 weeks before the procedure. These products include cigarettes, e-cigarettes, and chewing tobacco. If you need help quitting, ask your health care provider.  · Plan to have a responsible adult take you home from the hospital or clinic.  · Plan to have a responsible adult care for you for the time you are told after you leave the hospital or clinic. This is important.  · Ask your health care provider what steps will be taken to help prevent infection. These may include:  ? Removing hair at the surgery site.  ? Washing skin with a germ-killing soap.  ? Receiving antibiotic medicine.  What happens during the procedure?    · An IV will be inserted into one of your veins.  · You will be given one or more of the following:  ? A medicine to help you relax (sedative).  ? A medicine to numb the area (local anesthetic).  ? A medicine to make you fall asleep (general anesthetic).  · Your surgeon will make a few small incisions near your stomach. The electrodes will be placed into the stomach wall using the telescope and instruments.  · Your surgeon may place a long, thin, lighted tube with a camera on it down your throat and into your stomach (endoscopy). This is done to check that the electrodes have not gone through your stomach.  · When the electrodes are placed, the leads will be tunneled under the skin of your abdomen to the spot where the stimulator will be implanted.  · A small incision will be made through the skin and soft tissues of your abdomen, and a pocket will be made for the device.  · The device will be inserted into the pocket, and the leads will be attached.  · The device will be stitched (sutured) to the muscles of the wall of your abdomen to prevent movement after surgery.  · Your surgeon will check the device to make sure it is working properly.  · The incision in your abdomen will be closed with sutures or staples.  · A bandage (dressing) will be placed over each incision.  The procedure may vary  among health care providers and hospitals.  What happens after the procedure?  · Your blood pressure, heart rate, breathing rate, and blood oxygen level will be monitored until you leave the hospital or clinic.  · During your hospital stay, the GES device will be checked to make sure it is working and properly placed.  · You will get fluids through your IV until you can slowly return to your normal diet.  · When it is safe to go home, you will be sent home with home care instructions.  Summary  · Gastric electrical stimulation (GES) is a treatment for gastroparesis.  · You may need GES if medicines and diet changes have not relieved symptoms.  · During the procedure, your surgeon will place a small device called a stimulator beneath the skin over your abdomen.  · The device sends high-frequency, low-energy electrical pulses to help improve stomach emptying and reduce your symptoms.  · After the procedure, you will stay in the hospital while the GES device is checked and you return to your normal diet.  This information is not intended to replace advice given to you by your health care provider. Make sure you discuss any questions you have with your health care provider.  Document Revised: 04/26/2021 Document Reviewed: 04/26/2021  [employer] Patient Education © 2021 [employer]  Gastroparesis    Gastroparesis is a condition in which food takes longer than normal to empty from the stomach. This condition is also known as delayed gastric emptying. It is usually a long-term (chronic) condition.  There is no cure, but there are treatments and things that you can do at home to help relieve symptoms. Treating the underlying condition that causes gastroparesis can also help relieve symptoms.  What are the causes?  In many cases, the cause of this condition is not known. Possible causes include:  · A hormone (endocrine) disorder, such as hypothyroidism or diabetes.  · A nervous system disease, such as Parkinson's disease or  multiple sclerosis.  · Cancer, infection, or surgery that affects the stomach or vagus nerve. The vagus nerve runs from your chest, through your neck, and to the lower part of your brain.  · A connective tissue disorder, such as scleroderma.  · Certain medicines.  What increases the risk?  You are more likely to develop this condition if:  · You have certain disorders or diseases. These may include:  ? An endocrine disorder.  ? An eating disorder.  ? Amyloidosis.  ? Scleroderma.  ? Parkinson's disease.  ? Multiple sclerosis.  ? Cancer or infection of the stomach or the vagus nerve.  · You have had surgery on your stomach or vagus nerve.  · You take certain medicines.  · You are female.  What are the signs or symptoms?  Symptoms of this condition include:  · Feeling full after eating very little or a loss of appetite.  · Nausea, vomiting, or heartburn.  · Bloating of your abdomen.  · Inconsistent blood sugar (glucose) levels on blood tests.  · Unexplained weight loss.  · Acid from the stomach coming up into the esophagus (gastroesophageal reflux).  · Sudden tightening (spasm) of the stomach, which can be painful.  Symptoms may come and go. Some people may not notice any symptoms.  How is this diagnosed?  This condition is diagnosed with tests, such as:  · Tests that check how long it takes food to move through the stomach and intestines. These tests include:  ? Upper gastrointestinal (GI) series. For this test, you drink a liquid that shows up well on X-rays, and then X-rays are taken of your intestines.  ? Gastric emptying scintigraphy. For this test, you eat food that contains a small amount of radioactive material, and then scans are taken.  ? Wireless capsule GI monitoring system. For this test, you swallow a pill (capsule) that records information about how foods and fluid move through your stomach.  · Gastric manometry. For this test, a tube is passed down your throat and into your stomach to measure electrical  and muscular activity.  · Endoscopy. For this test, a long, thin tube with a camera and light on the end is passed down your throat and into your stomach to check for problems in your stomach lining.  · Ultrasound. This test uses sound waves to create images of the inside of your body. This can help rule out gallbladder disease or pancreatitis as a cause of your symptoms.  How is this treated?  There is no cure for this condition, but treatment and home care may relieve symptoms. Treatment may include:  · Treating the underlying cause.  · Managing your symptoms by making changes to your diet and exercise habits.  · Taking medicines to control nausea and vomiting and to stimulate stomach muscles.  · Getting food through a feeding tube in the hospital. This may be done in severe cases.  · Having surgery to insert a device called a gastric electrical stimulator into your body. This device helps improve stomach emptying and control nausea and vomiting.  Follow these instructions at home:  · Take over-the-counter and prescription medicines only as told by your health care provider.  · Follow instructions from your health care provider about eating or drinking restrictions. Your health care provider may recommend that you:  ? Eat smaller meals more often.  ? Eat low-fat foods.  ? Eat low-fiber forms of high-fiber foods. For example, eat cooked vegetables instead of raw vegetables.  ? Have only liquid foods instead of solid foods. Liquid foods are easier to digest.  · Drink enough fluid to keep your urine pale yellow.  · Exercise as often as told by your health care provider.  · Keep all follow-up visits. This is important.  Contact a health care provider if you:  · Notice that your symptoms do not improve with treatment.  · Have new symptoms.  Get help right away if you:  · Have severe pain in your abdomen that does not improve with treatment.  · Have nausea that is severe or does not go away.  · Vomit every time you  drink fluids.  Summary  · Gastroparesis is a long-term (chronic) condition in which food takes longer than normal to empty from the stomach.  · Symptoms include nausea, vomiting, heartburn, bloating of your abdomen, and loss of appetite.  · Eating smaller portions, low-fat foods, and low-fiber forms of high-fiber foods may help you manage your symptoms.  · Get help right away if you have severe pain in your abdomen.  This information is not intended to replace advice given to you by your health care provider. Make sure you discuss any questions you have with your health care provider.  Document Revised: 04/26/2021 Document Reviewed: 04/26/2021  [employer] Patient Education © 2021 [employer]

## 2021-12-29 ENCOUNTER — OFFICE VISIT (OUTPATIENT)
Dept: INTERNAL MEDICINE | Facility: CLINIC | Age: 36
End: 2021-12-29

## 2021-12-29 VITALS
WEIGHT: 121 LBS | RESPIRATION RATE: 18 BRPM | SYSTOLIC BLOOD PRESSURE: 110 MMHG | TEMPERATURE: 97.4 F | OXYGEN SATURATION: 100 % | HEART RATE: 81 BPM | HEIGHT: 67 IN | BODY MASS INDEX: 18.99 KG/M2 | DIASTOLIC BLOOD PRESSURE: 79 MMHG

## 2021-12-29 DIAGNOSIS — R23.8 SKIN PIMPLE: ICD-10-CM

## 2021-12-29 DIAGNOSIS — I88.9 LYMPHADENITIS: Primary | ICD-10-CM

## 2021-12-29 PROCEDURE — 99213 OFFICE O/P EST LOW 20 MIN: CPT | Performed by: FAMILY MEDICINE

## 2022-01-03 DIAGNOSIS — R13.14 PHARYNGOESOPHAGEAL DYSPHAGIA: ICD-10-CM

## 2022-01-03 DIAGNOSIS — R11.2 INTRACTABLE VOMITING WITH NAUSEA: Primary | ICD-10-CM

## 2022-01-03 RX ORDER — ESOMEPRAZOLE MAGNESIUM 40 MG/1
40 CAPSULE, DELAYED RELEASE ORAL 2 TIMES DAILY
Qty: 60 CAPSULE | Refills: 3 | Status: SHIPPED | OUTPATIENT
Start: 2022-01-03 | End: 2022-06-23

## 2022-01-17 ENCOUNTER — HOSPITAL ENCOUNTER (OUTPATIENT)
Dept: CARDIOLOGY | Facility: HOSPITAL | Age: 37
Discharge: HOME | End: 2022-01-17
Admitting: INTERNAL MEDICINE

## 2022-01-17 VITALS
WEIGHT: 121 LBS | HEIGHT: 67 IN | SYSTOLIC BLOOD PRESSURE: 124 MMHG | DIASTOLIC BLOOD PRESSURE: 82 MMHG | BODY MASS INDEX: 18.99 KG/M2

## 2022-01-17 DIAGNOSIS — R00.0 TACHYCARDIA: ICD-10-CM

## 2022-01-17 DIAGNOSIS — R00.2 PALPITATIONS: ICD-10-CM

## 2022-01-17 PROCEDURE — 93306 TTE W/DOPPLER COMPLETE: CPT | Performed by: INTERNAL MEDICINE

## 2022-01-17 PROCEDURE — 93306 TTE W/DOPPLER COMPLETE: CPT

## 2022-01-18 LAB
BH CV ECHO MEAS - AO MAX PG (FULL): 1.2 MMHG
BH CV ECHO MEAS - AO MAX PG: 3.8 MMHG
BH CV ECHO MEAS - AO MEAN PG (FULL): 1 MMHG
BH CV ECHO MEAS - AO MEAN PG: 2 MMHG
BH CV ECHO MEAS - AO ROOT AREA (BSA CORRECTED): 1.7
BH CV ECHO MEAS - AO ROOT AREA: 5.7 CM^2
BH CV ECHO MEAS - AO ROOT DIAM: 2.7 CM
BH CV ECHO MEAS - AO V2 MAX: 97.6 CM/SEC
BH CV ECHO MEAS - AO V2 MEAN: 67.5 CM/SEC
BH CV ECHO MEAS - AO V2 VTI: 19.8 CM
BH CV ECHO MEAS - AVA(I,A): 3 CM^2
BH CV ECHO MEAS - AVA(I,D): 3 CM^2
BH CV ECHO MEAS - AVA(V,A): 2.9 CM^2
BH CV ECHO MEAS - AVA(V,D): 2.9 CM^2
BH CV ECHO MEAS - BSA(HAYCOCK): 1.6 M^2
BH CV ECHO MEAS - BSA: 1.6 M^2
BH CV ECHO MEAS - BZI_BMI: 19 KILOGRAMS/M^2
BH CV ECHO MEAS - BZI_METRIC_HEIGHT: 170.2 CM
BH CV ECHO MEAS - BZI_METRIC_WEIGHT: 54.9 KG
BH CV ECHO MEAS - EDV(CUBED): 71.5 ML
BH CV ECHO MEAS - EDV(MOD-SP4): 60 ML
BH CV ECHO MEAS - EDV(TEICH): 76.4 ML
BH CV ECHO MEAS - EF(CUBED): 82.8 %
BH CV ECHO MEAS - EF(MOD-SP4): 59.2 %
BH CV ECHO MEAS - EF(TEICH): 76 %
BH CV ECHO MEAS - ESV(CUBED): 12.3 ML
BH CV ECHO MEAS - ESV(MOD-SP4): 24.5 ML
BH CV ECHO MEAS - ESV(TEICH): 18.3 ML
BH CV ECHO MEAS - FS: 44.3 %
BH CV ECHO MEAS - IVS/LVPW: 0.9
BH CV ECHO MEAS - IVSD: 0.7 CM
BH CV ECHO MEAS - LA DIMENSION: 2.5 CM
BH CV ECHO MEAS - LA/AO: 0.93
BH CV ECHO MEAS - LAT PEAK E' VEL: 15.8 CM/SEC
BH CV ECHO MEAS - LV DIASTOLIC VOL/BSA (35-75): 36.7 ML/M^2
BH CV ECHO MEAS - LV MASS(C)D: 88.7 GRAMS
BH CV ECHO MEAS - LV MASS(C)DI: 54.3 GRAMS/M^2
BH CV ECHO MEAS - LV MAX PG: 2.6 MMHG
BH CV ECHO MEAS - LV MEAN PG: 1 MMHG
BH CV ECHO MEAS - LV SYSTOLIC VOL/BSA (12-30): 15 ML/M^2
BH CV ECHO MEAS - LV V1 MAX: 80.5 CM/SEC
BH CV ECHO MEAS - LV V1 MEAN: 54.8 CM/SEC
BH CV ECHO MEAS - LV V1 VTI: 17.3 CM
BH CV ECHO MEAS - LVIDD: 4.2 CM
BH CV ECHO MEAS - LVIDS: 2.3 CM
BH CV ECHO MEAS - LVLD AP4: 8.3 CM
BH CV ECHO MEAS - LVLS AP4: 6.7 CM
BH CV ECHO MEAS - LVOT AREA (M): 3.5 CM^2
BH CV ECHO MEAS - LVOT AREA: 3.5 CM^2
BH CV ECHO MEAS - LVOT DIAM: 2.1 CM
BH CV ECHO MEAS - LVPWD: 0.77 CM
BH CV ECHO MEAS - MED PEAK E' VEL: 11.2 CM/SEC
BH CV ECHO MEAS - MV A MAX VEL: 54.8 CM/SEC
BH CV ECHO MEAS - MV DEC TIME: 0.2 SEC
BH CV ECHO MEAS - MV E MAX VEL: 67.9 CM/SEC
BH CV ECHO MEAS - MV E/A: 1.2
BH CV ECHO MEAS - RAP SYSTOLE: 5 MMHG
BH CV ECHO MEAS - RVSP: 20.2 MMHG
BH CV ECHO MEAS - SI(AO): 69.4 ML/M^2
BH CV ECHO MEAS - SI(CUBED): 36.2 ML/M^2
BH CV ECHO MEAS - SI(LVOT): 36.7 ML/M^2
BH CV ECHO MEAS - SI(MOD-SP4): 21.7 ML/M^2
BH CV ECHO MEAS - SI(TEICH): 35.6 ML/M^2
BH CV ECHO MEAS - SV(AO): 113.4 ML
BH CV ECHO MEAS - SV(CUBED): 59.1 ML
BH CV ECHO MEAS - SV(LVOT): 59.9 ML
BH CV ECHO MEAS - SV(MOD-SP4): 35.5 ML
BH CV ECHO MEAS - SV(TEICH): 58.1 ML
BH CV ECHO MEAS - TR MAX VEL: 195 CM/SEC
BH CV ECHO MEASUREMENTS AVERAGE E/E' RATIO: 5.03
LEFT ATRIUM VOLUME INDEX: 18.8 ML/M2
LEFT ATRIUM VOLUME: 30.6 CM3
MAXIMAL PREDICTED HEART RATE: 182 BPM
STRESS TARGET HR: 155 BPM

## 2022-01-20 ENCOUNTER — TELEPHONE (OUTPATIENT)
Dept: CARDIOLOGY | Facility: CLINIC | Age: 37
End: 2022-01-20

## 2022-01-20 NOTE — TELEPHONE ENCOUNTER
Patient is coming for follow up visit on 3/3 and would like to discuss removal of loop recorder. Mohini Her MA

## 2022-01-21 NOTE — PROGRESS NOTES
Subjective     Chief Complaint   Patient presents with   • Mass     On back of neck. Noticed yesterday.        History of Present Illness  Patient presents with mass on back of neck.  New onset   Noted yesterday.  Sore.  Patient's PMR from outside medical facility reviewed and noted.    Review of Systems     Otherwise complete ROS reviewed and negative except as mentioned in the HPI.    Past Medical History:   Past Medical History:   Diagnosis Date   • Abnormal ECG    • Allergic 2005   • Anxiety 2010   • Arrhythmia    • Asthma, extrinsic    • Gastroparesis 12/23/2021   • GERD (gastroesophageal reflux disease)    • Globus sensation 6/15/2021   • Headache 2002   • Intractable nausea and vomiting 6/15/2021   • Mild intermittent asthma without complication 6/15/2021   • Tachycardia      Past Surgical History:  Past Surgical History:   Procedure Laterality Date   • ADENOIDECTOMY     • APPENDECTOMY     • CARDIAC SURGERY      loop recorder   • CHOLECYSTECTOMY     • COLONOSCOPY  2015   • ENDOMETRIAL ABLATION     • ENDOSCOPY N/A 7/26/2021    Procedure: ESOPHAGOGASTRODUODENOSCOPY WITH ANESTHESIA;  Surgeon: Keegan Pope DO;  Location: [facility] ENDOSCOPY;  Service: Gastroenterology;  Laterality: N/A;  pre GERD  post  jesus BLOCK   • HYSTERECTOMY     • SUBTOTAL HYSTERECTOMY  2016   • TONSILLECTOMY       Social History:  reports that she has never smoked. She has never used smokeless tobacco. She reports current alcohol use. She reports that she does not use drugs.    Family History: family history includes COPD in her maternal grandmother; Cancer in her maternal grandfather; Heart disease in her maternal grandmother; No Known Problems in her father and mother; Stroke in her maternal grandfather. She was adopted.       Allergies:  Allergies   Allergen Reactions   • Compazine [Prochlorperazine] Palpitations   • Iodinated Diagnostic Agents Anaphylaxis   • Sulfa Antibiotics Angioedema, Other (See Comments) and  "Rash     breathing difficulty       Medications:  Prior to Admission medications    Medication Sig Start Date End Date Taking? Authorizing Provider   albuterol (PROVENTIL) (2.5 MG/3ML) 0.083% nebulizer solution INHALE 3 ML PO Q 4 - 6 H PRM   Yes Albaro Kenyon MD   albuterol sulfate  (90 Base) MCG/ACT inhaler Inhale 2 puffs Every 4 (Four) Hours As Needed for Wheezing. 9/3/21  Yes Jesusita Patterson APRN   ALPRAZolam (XANAX) 0.5 MG tablet Take 1 tablet by mouth 2 (Two) Times a Day As Needed for Anxiety. for anxiety 12/2/21  Yes Jesusita Patterson APRN   Ascorbic Acid (DEANN-C PO) Take  by mouth.   Yes ProviderAlbaro MD   Cholecalciferol (Vitamin D3) 50 MCG (2000 UT) capsule Take 2,000 Units by mouth Daily.   Yes ProviderAlbaro MD   famotidine (PEPCID) 20 MG tablet Take 20 mg by mouth At Night As Needed for Heartburn.   Yes ProviderAlbaro MD   Multiple Vitamins-Minerals (ZINC PO) Take  by mouth.   Yes ProviderAlbaro MD   ondansetron ODT (ZOFRAN-ODT) 8 MG disintegrating tablet Place 1 tablet on the tongue Every 8 (Eight) Hours As Needed for Nausea or Vomiting. 12/2/21  Yes Jesusita Patterson APRN   esomeprazole (nexIUM) 40 MG capsule Take 1 capsule by mouth 2 (Two) Times a Day. 1/3/22   Jesusita Patterson APRN   fluticasone-salmeterol (ADVAIR HFA) 115-21 MCG/ACT inhaler Inhale 2 puffs 2 (Two) Times a Day for 30 days. 9/23/21 11/30/21  Princess Rico APRN   mupirocin (BACTROBAN) 2 % ointment Apply 1 application topically to the appropriate area as directed 3 (Three) Times a Day. 12/29/21   Leonarda Schafer DO       Objective     Vital Signs: /79 (BP Location: Left arm, Patient Position: Sitting, Cuff Size: Adult)   Pulse 81   Temp 97.4 °F (36.3 °C) (Skin)   Resp 18   Ht 170.2 cm (67\")   Wt 54.9 kg (121 lb)   SpO2 100%   BMI 18.95 kg/m²   Physical Exam  Vitals and nursing note reviewed.   Constitutional:       Appearance: She is " well-developed.   HENT:      Head: Normocephalic and atraumatic.      Right Ear: External ear normal.      Left Ear: External ear normal.      Nose: Nose normal.   Eyes:      General: No scleral icterus.        Right eye: No discharge.         Left eye: No discharge.      Conjunctiva/sclera: Conjunctivae normal.      Pupils: Pupils are equal, round, and reactive to light.   Neck:      Thyroid: No thyromegaly.      Vascular: No JVD.      Trachea: No tracheal deviation.   Cardiovascular:      Rate and Rhythm: Normal rate and regular rhythm.      Heart sounds: Normal heart sounds. No murmur heard.  No friction rub. No gallop.    Pulmonary:      Effort: Pulmonary effort is normal.      Breath sounds: Normal breath sounds.   Abdominal:      General: Bowel sounds are normal. There is no distension.      Palpations: Abdomen is soft.      Tenderness: There is no abdominal tenderness.   Musculoskeletal:         General: Normal range of motion.      Cervical back: Normal range of motion and neck supple.   Lymphadenopathy:      Cervical: No cervical adenopathy.   Skin:     General: Skin is warm and dry.      Capillary Refill: Capillary refill takes less than 2 seconds.      Comments: Upper back with red lesion   Tender to palp.  The lymphnodes adjacent are enlarged.    Neurological:      Mental Status: She is alert and oriented to person, place, and time.      Cranial Nerves: No cranial nerve deficit.      Coordination: Coordination normal.   Psychiatric:         Mood and Affect: Mood normal.         Behavior: Behavior normal.         Patient's Body mass index is 18.95 kg/m². indicating that she is within normal range (BMI 18.5-24.9). No BMI management plan needed..      Results Reviewed:  Glucose   Date Value Ref Range Status   09/17/2021 96 65 - 99 mg/dL Final     BUN   Date Value Ref Range Status   09/17/2021 5 (L) 6 - 20 mg/dL Final     Creatinine   Date Value Ref Range Status   09/17/2021 0.56 (L) 0.57 - 1.00 mg/dL Final      Sodium   Date Value Ref Range Status   09/17/2021 139 136 - 145 mmol/L Final     Potassium   Date Value Ref Range Status   09/17/2021 3.9 3.5 - 5.2 mmol/L Final     Chloride   Date Value Ref Range Status   09/17/2021 102 98 - 107 mmol/L Final     CO2   Date Value Ref Range Status   09/17/2021 27.0 22.0 - 29.0 mmol/L Final     Calcium   Date Value Ref Range Status   09/17/2021 9.3 8.6 - 10.5 mg/dL Final     ALT (SGPT)   Date Value Ref Range Status   09/17/2021 32 1 - 33 U/L Final     AST (SGOT)   Date Value Ref Range Status   09/17/2021 30 1 - 32 U/L Final     WBC   Date Value Ref Range Status   09/17/2021 4.44 3.40 - 10.80 10*3/mm3 Final   07/22/2021 4.7 3.4 - 10.8 x10E3/uL Final   05/24/2018 5.3 4.0 - 10.5 10*3/uL Final     Hematocrit   Date Value Ref Range Status   09/17/2021 41.6 34.0 - 46.6 % Final   05/24/2018 40.0 37.0 - 47.0 % Final     Platelets   Date Value Ref Range Status   09/17/2021 255 140 - 450 10*3/mm3 Final   05/24/2018 295 150 - 450 10*3/uL Final     Triglycerides   Date Value Ref Range Status   12/02/2021 57 0 - 149 mg/dL Final     HDL Cholesterol   Date Value Ref Range Status   12/02/2021 55 >39 mg/dL Final     LDL Chol Calc (NIH)   Date Value Ref Range Status   12/02/2021 70 0 - 99 mg/dL Final         Assessment / Plan     Assessment/Plan:  1. Lymphadenitis  Monitor if does not resolve in 3-4 days then return to clinic    2. Skin pimple    Use murpirocin on little skin wounds/lesion      Return if symptoms worsen or fail to improve. unless patient needs to be seen sooner or acute issues arise.        I have discussed the patient results/orders and and plan/recommendation with them at today's visit.      Leonarda Schafer,    12/29/2021

## 2022-03-03 ENCOUNTER — OFFICE VISIT (OUTPATIENT)
Dept: INTERNAL MEDICINE | Facility: CLINIC | Age: 37
End: 2022-03-03

## 2022-03-03 ENCOUNTER — TELEMEDICINE (OUTPATIENT)
Dept: CARDIOLOGY | Facility: CLINIC | Age: 37
End: 2022-03-03

## 2022-03-03 VITALS
TEMPERATURE: 98.2 F | WEIGHT: 117 LBS | DIASTOLIC BLOOD PRESSURE: 74 MMHG | BODY MASS INDEX: 18.36 KG/M2 | SYSTOLIC BLOOD PRESSURE: 128 MMHG | OXYGEN SATURATION: 100 % | HEIGHT: 67 IN | HEART RATE: 98 BPM | RESPIRATION RATE: 16 BRPM

## 2022-03-03 VITALS
DIASTOLIC BLOOD PRESSURE: 71 MMHG | SYSTOLIC BLOOD PRESSURE: 129 MMHG | HEIGHT: 67 IN | WEIGHT: 115 LBS | BODY MASS INDEX: 18.05 KG/M2 | HEART RATE: 83 BPM | OXYGEN SATURATION: 97 %

## 2022-03-03 DIAGNOSIS — Z79.899 LONG TERM USE OF DRUG: ICD-10-CM

## 2022-03-03 DIAGNOSIS — I47.1 PSVT (PAROXYSMAL SUPRAVENTRICULAR TACHYCARDIA): Primary | ICD-10-CM

## 2022-03-03 DIAGNOSIS — I88.9 LYMPHADENITIS: Primary | ICD-10-CM

## 2022-03-03 DIAGNOSIS — R07.89 CHEST PRESSURE: ICD-10-CM

## 2022-03-03 DIAGNOSIS — F41.9 ANXIETY: ICD-10-CM

## 2022-03-03 PROCEDURE — 99213 OFFICE O/P EST LOW 20 MIN: CPT | Performed by: NURSE PRACTITIONER

## 2022-03-03 PROCEDURE — 99213 OFFICE O/P EST LOW 20 MIN: CPT | Performed by: INTERNAL MEDICINE

## 2022-03-03 RX ORDER — ALPRAZOLAM 0.5 MG/1
0.5 TABLET ORAL 2 TIMES DAILY PRN
Qty: 60 TABLET | Refills: 0 | Status: SHIPPED | OUTPATIENT
Start: 2022-03-03 | End: 2022-05-10 | Stop reason: SDUPTHER

## 2022-03-03 RX ORDER — TRAZODONE HYDROCHLORIDE 50 MG/1
50 TABLET ORAL DAILY
COMMUNITY
Start: 2022-03-01 | End: 2022-05-10

## 2022-03-03 NOTE — PROGRESS NOTES
Subjective:     Encounter Date:03/03/2022    You have chosen to receive care through a telehealth visit.  Do you consent to use a video/audio connection for your medical care today? Yes        Patient ID: Mago Gamboa is a 36 y.o. female.    Chief Complaint: Follow-up palpitations, likely SVT  History of Present Illness  36-year-old initially referred to me November 30, 2021, with a longstanding history of intermittent rotations and been fully evaluated the past with a Holter monitor and subsequently placement of implantable loop monitor.  I suspect there is a high history she provided that her previous episodes were consistent with SVT.  We discussed suppressive daily beta-blocker therapy, EP referral for ablation, or conservative measures.  She preferred to continue conservative measures for now and definitely did not want to start a beta-blocker.  Subsequent echocardiogram confirmed a structurally normal heart.  She was complaining of some chest pressure so I did also have her do a treadmill stress test.  Lastly, we discussed in potentially removing her loop recorder at a later date.  She has recently called back now interested in doing this.      I did obtain extensive amount of records from her prior cardiology group after our first visit, which I summarized as an addendum to the initial encounter visit note, copied below:      Addendum: By mail, we received a large stack of papers from the [facility] in [facility] including serial transmissions received from her [place] confirm loop monitor.  Salient findings per my independent review of this data outlined below:  -On October 16, 2020 there was a 10-second episode of tachycardia that appears to be a regular narrow complex tachycardia with a rate of approximately 150 bpm.  -The monitor reported an episode that it labeled his atrial fibrillation for 8 minutes on October 16, 2020 but there are clearly P waves so this was not  actually atrial fibrillation  -Transmission from September 3, 2020 shows 13 occurrences of tachycardia since the prior transmission.  These ranged in duration from 3 seconds up to 36 seconds, with heart rate usually always right around 160 bpm.  -Transition from August 21, 2020 showed 7 episodes of short-lived tachycardia, up to 37 seconds, again with rates usually in the 160s-170s.  -Similar findings with 3 occurrences on the July 26, 2020 reading.  Symptoms were reported then and a tracing was provided that appears to have regular narrow complex tachycardia with a rate of approximate 160 bpm.  -Most of these tachycardia episodes do appear to be sinus tachycardia, though I cannot exclude an atrial tachycardia as a source for some as the monitor is not sensitive enough to detect changes in morphology of P waves  --------------------------------------------------------------------------------------------------------------------------------------  She returns today by way of a video visit for follow-up.  She reports that her symptoms of palpitations are stable - self-resolving without vagal maneuvers.  She is eager to get out her loop recorder.  It is not causing her pain per se, but does state that she does not like its presence    The following portions of the patient's history were reviewed and updated as appropriate: allergies, current medications, past family history, past medical history, past social history, past surgical history and problem list.    Review of Systems   Constitutional: Negative for malaise/fatigue.   Cardiovascular: Negative for chest pain, claudication, dyspnea on exertion, leg swelling, near-syncope, orthopnea, palpitations, paroxysmal nocturnal dyspnea and syncope.   Respiratory: Negative for shortness of breath.    Hematologic/Lymphatic: Does not bruise/bleed easily.           Current Outpatient Medications:   •  albuterol (PROVENTIL) (2.5 MG/3ML) 0.083% nebulizer solution, INHALE 3 ML PO Q  4 - 6 H PRM, Disp: , Rfl:   •  albuterol sulfate  (90 Base) MCG/ACT inhaler, Inhale 2 puffs Every 4 (Four) Hours As Needed for Wheezing., Disp: 18 g, Rfl: 3  •  ALPRAZolam (XANAX) 0.5 MG tablet, Take 1 tablet by mouth 2 (Two) Times a Day As Needed for Anxiety. for anxiety, Disp: 60 tablet, Rfl: 0  •  Ascorbic Acid (DEANN-C PO), Take  by mouth., Disp: , Rfl:   •  Cholecalciferol (Vitamin D3) 50 MCG (2000 UT) capsule, Take 2,000 Units by mouth Daily., Disp: , Rfl:   •  esomeprazole (nexIUM) 40 MG capsule, Take 1 capsule by mouth 2 (Two) Times a Day. (Patient taking differently: Take 40 mg by mouth Daily.), Disp: 60 capsule, Rfl: 3  •  famotidine (PEPCID) 20 MG tablet, Take 20 mg by mouth At Night As Needed for Heartburn., Disp: , Rfl:   •  Multiple Vitamins-Minerals (ZINC PO), Take  by mouth., Disp: , Rfl:   •  fluticasone-salmeterol (ADVAIR HFA) 115-21 MCG/ACT inhaler, Inhale 2 puffs 2 (Two) Times a Day for 30 days., Disp: 1 each, Rfl: 11  •  mupirocin (BACTROBAN) 2 % ointment, Apply 1 application topically to the appropriate area as directed 3 (Three) Times a Day., Disp: 30 g, Rfl: 1  •  ondansetron ODT (ZOFRAN-ODT) 8 MG disintegrating tablet, Place 1 tablet on the tongue Every 8 (Eight) Hours As Needed for Nausea or Vomiting., Disp: 90 tablet, Rfl: 5  •  traZODone (DESYREL) 50 MG tablet, Take 50 mg by mouth Daily., Disp: , Rfl:        Objective:      Vitals:    03/03/22 1234   BP: 129/71   Pulse: 83   SpO2: 97%     Vitals and nursing note reviewed.   Constitutional:       Appearance: Healthy appearance. Not in distress.   Pulmonary:      Effort: Pulmonary effort is normal.   Neurological:      Mental Status: Alert.         Lab Review:       Procedures    Results for orders placed during the hospital encounter of 01/17/22    Adult Transthoracic Echo Complete W/ Cont if Necessary Per Protocol    Interpretation Summary  · Left ventricular ejection fraction appears to be 56 - 60%. Left ventricular systolic  function is normal.  · Left ventricular diastolic function was normal.  · Estimated right ventricular systolic pressure from tricuspid regurgitation is normal (<35 mmHg).  · Normal size and function of the right ventricle.  · No significant valvular pathology.       Assessment/Plan:     Problem List Items Addressed This Visit        Cardiac and Vasculature    PSVT (paroxysmal supraventricular tachycardia) (HCC) - Primary: stable       Symptoms and Signs    Chest pressure: non-ischemic (normal TST 12/6/21); stable        Recommendations/plans:    Continue surveillance of SVT, possible atrial tachycardia (based on prior data from loop recorder, now that is dormant).     Schedule loop explant at patient's convenience - she will call us back with some dates she'd like to have this done.  I advised her to bring a  with her in case we need to administer sedative medications.    12 minutes on video visit    Roel Fall MD  03/03/2022  13:03 CST

## 2022-03-03 NOTE — PROGRESS NOTES
"        Subjective     Chief Complaint   Patient presents with   • Anxiety     3 mo fu       History of Present Illness  Pt comes in today with complaints of anxiety. She has been to [place] for GI issues and was told she has a sensitive stomach. She was started on Amitriptyline and about 3-4 days in, she developed a rash. I instructed her to stop the medication. The rash has dissipated. States she did notice that she didn't wake up sick when she was taking it. She has now been switched to Trazodone per Dr. Cunningham. She continues to have some intermittent nausea and vomiting. Still with anxiety and has palpitations.     She was seen back in December with \"knots' on the back of her head. Was treated with bactroban and told to follow up if it doesn't go away. It has not dissipated.     Review of Systems   Otherwise complete ROS reviewed.     Past Medical History:   Past Medical History:   Diagnosis Date   • Abnormal ECG    • Allergic 2005   • Anxiety 2010   • Arrhythmia    • Asthma, extrinsic    • Gastroparesis 12/23/2021   • GERD (gastroesophageal reflux disease)    • Globus sensation 6/15/2021   • Headache 2002   • Intractable nausea and vomiting 6/15/2021   • Mild intermittent asthma without complication 6/15/2021   • Tachycardia      Past Surgical History:  Past Surgical History:   Procedure Laterality Date   • ADENOIDECTOMY     • APPENDECTOMY     • CARDIAC SURGERY      loop recorder   • CHOLECYSTECTOMY     • COLONOSCOPY  2015   • ENDOMETRIAL ABLATION     • ENDOSCOPY N/A 7/26/2021    Procedure: ESOPHAGOGASTRODUODENOSCOPY WITH ANESTHESIA;  Surgeon: Keegan Pope DO;  Location: [facility] ENDOSCOPY;  Service: Gastroenterology;  Laterality: N/A;  pre GERD  post  jesus BLOCK   • HYSTERECTOMY     • SUBTOTAL HYSTERECTOMY  2016   • TONSILLECTOMY       Social History:  reports that she has never smoked. She has never used smokeless tobacco. She reports previous alcohol use. She reports that she does not use " drugs.    Family History: family history includes COPD in her maternal grandmother; Cancer in her maternal grandfather; Heart disease in her maternal grandmother; No Known Problems in her father and mother; Stroke in her maternal grandfather. She was adopted.       Allergies:  Allergies   Allergen Reactions   • Compazine [Prochlorperazine] Palpitations   • Iodinated Diagnostic Agents Anaphylaxis   • Nortriptyline Hcl Rash   • Sulfa Antibiotics Angioedema, Other (See Comments) and Rash     breathing difficulty       Medications:  Prior to Admission medications    Medication Sig Start Date End Date Taking? Authorizing Provider   albuterol (PROVENTIL) (2.5 MG/3ML) 0.083% nebulizer solution INHALE 3 ML PO Q 4 - 6 H PRM   Yes Albaro Kenyon MD   albuterol sulfate  (90 Base) MCG/ACT inhaler Inhale 2 puffs Every 4 (Four) Hours As Needed for Wheezing. 9/3/21  Yes Jesusita Patterson APRN   ALPRAZolam (XANAX) 0.5 MG tablet Take 1 tablet by mouth 2 (Two) Times a Day As Needed for Anxiety. for anxiety 12/2/21  Yes Jesusita Patterson APRN   Ascorbic Acid (DEANN-C PO) Take  by mouth.   Yes Albaro Kenyon MD   Cholecalciferol (Vitamin D3) 50 MCG (2000 UT) capsule Take 2,000 Units by mouth Daily.   Yes Albaro Kenyon MD   esomeprazole (nexIUM) 40 MG capsule Take 1 capsule by mouth 2 (Two) Times a Day.  Patient taking differently: Take 40 mg by mouth Daily. 1/3/22  Yes Jesusita Patterson APRN   famotidine (PEPCID) 20 MG tablet Take 20 mg by mouth At Night As Needed for Heartburn.   Yes Albaro Kenoyn MD   Multiple Vitamins-Minerals (ZINC PO) Take  by mouth.   Yes Albaro Kenyon MD   ondansetron ODT (ZOFRAN-ODT) 8 MG disintegrating tablet Place 1 tablet on the tongue Every 8 (Eight) Hours As Needed for Nausea or Vomiting. 12/2/21  Yes Jesusita Patterson APRN   traZODone (DESYREL) 50 MG tablet Take 50 mg by mouth Daily. 3/1/22 8/29/22 Yes Albaro Kenyon MD  "  fluticasone-salmeterol (ADVAIR HFA) 115-21 MCG/ACT inhaler Inhale 2 puffs 2 (Two) Times a Day for 30 days. 9/23/21 11/30/21  Princess Rico APRN   mupirocin (BACTROBAN) 2 % ointment Apply 1 application topically to the appropriate area as directed 3 (Three) Times a Day. 12/29/21   Leonarda Schafer DO       Objective     Vital Signs: /74 (BP Location: Right arm, Patient Position: Sitting, Cuff Size: Adult)   Pulse 98   Temp 98.2 °F (36.8 °C) (Infrared)   Resp 16   Ht 170.2 cm (67.01\")   Wt 53.1 kg (117 lb)   SpO2 100%   BMI 18.32 kg/m²   Physical Exam  Vitals reviewed.   Constitutional:       Appearance: She is well-developed.   HENT:      Head: Normocephalic and atraumatic.      Right Ear: Tympanic membrane normal.      Left Ear: Tympanic membrane normal.   Eyes:      Pupils: Pupils are equal, round, and reactive to light.   Neck:      Vascular: No JVD.   Cardiovascular:      Rate and Rhythm: Normal rate and regular rhythm.   Pulmonary:      Effort: Pulmonary effort is normal.      Breath sounds: Normal breath sounds.   Abdominal:      General: Abdomen is flat. Bowel sounds are normal.      Palpations: Abdomen is soft.      Tenderness: There is abdominal tenderness ( epigastric tenderness).   Musculoskeletal:         General: No deformity.      Cervical back: Normal range of motion and neck supple.   Lymphadenopathy:      Cervical: Cervical adenopathy present.      Right cervical: Posterior cervical adenopathy present.   Skin:     General: Skin is warm and dry.   Neurological:      General: No focal deficit present.      Mental Status: She is alert and oriented to person, place, and time.   Psychiatric:         Behavior: Behavior normal.         Thought Content: Thought content normal.         Judgment: Judgment normal.         Patient's Body mass index is 18.32 kg/m². indicating that she is within normal range (BMI 18.5-24.9). No BMI management plan needed..      Results Reviewed:  Glucose "   Date Value Ref Range Status   09/17/2021 96 65 - 99 mg/dL Final     BUN   Date Value Ref Range Status   09/17/2021 5 (L) 6 - 20 mg/dL Final     Creatinine   Date Value Ref Range Status   09/17/2021 0.56 (L) 0.57 - 1.00 mg/dL Final     Sodium   Date Value Ref Range Status   09/17/2021 139 136 - 145 mmol/L Final     Potassium   Date Value Ref Range Status   09/17/2021 3.9 3.5 - 5.2 mmol/L Final     Chloride   Date Value Ref Range Status   09/17/2021 102 98 - 107 mmol/L Final     CO2   Date Value Ref Range Status   09/17/2021 27.0 22.0 - 29.0 mmol/L Final     Calcium   Date Value Ref Range Status   09/17/2021 9.3 8.6 - 10.5 mg/dL Final     ALT (SGPT)   Date Value Ref Range Status   09/17/2021 32 1 - 33 U/L Final     AST (SGOT)   Date Value Ref Range Status   09/17/2021 30 1 - 32 U/L Final     WBC   Date Value Ref Range Status   09/17/2021 4.44 3.40 - 10.80 10*3/mm3 Final   07/22/2021 4.7 3.4 - 10.8 x10E3/uL Final   05/24/2018 5.3 4.0 - 10.5 10*3/uL Final     Hematocrit   Date Value Ref Range Status   09/17/2021 41.6 34.0 - 46.6 % Final   05/24/2018 40.0 37.0 - 47.0 % Final     Platelets   Date Value Ref Range Status   09/17/2021 255 140 - 450 10*3/mm3 Final   05/24/2018 295 150 - 450 10*3/uL Final     Triglycerides   Date Value Ref Range Status   12/02/2021 57 0 - 149 mg/dL Final     HDL Cholesterol   Date Value Ref Range Status   12/02/2021 55 >39 mg/dL Final     LDL Chol Calc (NIH)   Date Value Ref Range Status   12/02/2021 70 0 - 99 mg/dL Final         Assessment / Plan     Assessment/Plan:  Diagnoses and all orders for this visit:    1. Lymphadenitis (Primary)  -     US Head Neck Soft Tissue    2. Long term use of drug  -     Urine Drug Screen - Urine, Clean Catch    3. Anxiety  -     ALPRAZolam (XANAX) 0.5 MG tablet; Take 1 tablet by mouth 2 (Two) Times a Day As Needed for Anxiety. for anxiety  Dispense: 60 tablet; Refill: 0    US shows lymph nodes in subq tissue    Return in about 3 months (around 6/3/2022).  unless patient needs to be seen sooner or acute issues arise.    Code Status:     I have discussed the patient results/orders and and plan/recommendation with them at today's visit.      Jesusita Patterson, APRN   03/03/2022

## 2022-03-06 LAB
AMPHETAMINES UR QL SCN: NEGATIVE NG/ML
BARBITURATES UR QL SCN: NEGATIVE NG/ML
BENZODIAZ UR QL SCN: NEGATIVE NG/ML
BZE UR QL SCN: NEGATIVE NG/ML
CANNABINOIDS UR QL SCN: NEGATIVE NG/ML
CREAT UR-MCNC: 30.7 MG/DL (ref 20–300)
LABORATORY COMMENT REPORT: NORMAL
METHADONE UR QL SCN: NEGATIVE NG/ML
OPIATES UR QL SCN: NEGATIVE NG/ML
OXYCODONE+OXYMORPHONE UR QL SCN: NEGATIVE NG/ML
PCP UR QL: NEGATIVE NG/ML
PH UR: 6.9 [PH] (ref 4.5–8.9)
PROPOXYPH UR QL SCN: NEGATIVE NG/ML

## 2022-03-07 ENCOUNTER — TELEPHONE (OUTPATIENT)
Dept: PULMONOLOGY | Facility: CLINIC | Age: 37
End: 2022-03-07

## 2022-03-07 ENCOUNTER — HOSPITAL ENCOUNTER (OUTPATIENT)
Dept: GENERAL RADIOLOGY | Facility: HOSPITAL | Age: 37
Discharge: HOME | End: 2022-03-07
Admitting: NURSE PRACTITIONER

## 2022-03-07 DIAGNOSIS — R06.02 SHORTNESS OF BREATH: Primary | ICD-10-CM

## 2022-03-07 DIAGNOSIS — R06.02 SHORTNESS OF BREATH: ICD-10-CM

## 2022-03-07 PROCEDURE — 71046 X-RAY EXAM CHEST 2 VIEWS: CPT

## 2022-03-07 NOTE — TELEPHONE ENCOUNTER
----- Message from Mago Gamboa sent at 3/7/2022  2:17 PM CST -----  Regarding: Chest xray results  Thank you,  is there a way to  show if the SOB is from asthma or maybe from acid reflux or something else that could be causing it? Thanks

## 2022-03-07 NOTE — TELEPHONE ENCOUNTER
Regarding: Question   Yes, please order.  I can go today.. [facility]

## 2022-03-07 NOTE — TELEPHONE ENCOUNTER
I do not have availability to asthma specific testing here in the clinic. We could refer to Dr. Emmanuel which is a local asthma/allergy specialist. He has access to Feno testing. This is a breathing test that is specific to asthma and if abnormal would confirm it. If normal would be confirmation that it is not uncontrolled asthma that is causing symptoms. I reviewed recent telephone messages and office notes from the GI specialist at [facility] and your PCP. My suspicion would be reflux but if you want confirmation, I can refer to Dr. Emmanuel. Please let me know what you decide.

## 2022-03-08 NOTE — TELEPHONE ENCOUNTER
I'm not sure why this message is being sent to multiple providers? This appears to be Princess's patient.

## 2022-03-10 RX ORDER — METHYLPREDNISOLONE 4 MG/1
TABLET ORAL
Qty: 21 EACH | Refills: 0 | Status: SHIPPED | OUTPATIENT
Start: 2022-03-10 | End: 2022-05-10

## 2022-03-14 DIAGNOSIS — R76.8 POSITIVE ANA (ANTINUCLEAR ANTIBODY): Primary | ICD-10-CM

## 2022-03-21 ENCOUNTER — HOSPITAL ENCOUNTER (OUTPATIENT)
Dept: ULTRASOUND IMAGING | Facility: HOSPITAL | Age: 37
Discharge: HOME | End: 2022-03-21

## 2022-03-21 ENCOUNTER — LAB (OUTPATIENT)
Dept: LAB | Facility: HOSPITAL | Age: 37
End: 2022-03-21

## 2022-03-21 ENCOUNTER — OFFICE VISIT (OUTPATIENT)
Dept: FAMILY MEDICINE CLINIC | Facility: CLINIC | Age: 37
End: 2022-03-21

## 2022-03-21 VITALS
OXYGEN SATURATION: 99 % | BODY MASS INDEX: 17.52 KG/M2 | WEIGHT: 111.6 LBS | DIASTOLIC BLOOD PRESSURE: 60 MMHG | HEIGHT: 67 IN | TEMPERATURE: 98.5 F | HEART RATE: 68 BPM | SYSTOLIC BLOOD PRESSURE: 122 MMHG

## 2022-03-21 DIAGNOSIS — M79.661 PAIN IN RIGHT LOWER LEG: ICD-10-CM

## 2022-03-21 DIAGNOSIS — M67.40 GANGLION CYST: ICD-10-CM

## 2022-03-21 DIAGNOSIS — M79.89 LEG SWELLING: ICD-10-CM

## 2022-03-21 DIAGNOSIS — M79.89 LEG SWELLING: Primary | ICD-10-CM

## 2022-03-21 LAB
ALBUMIN SERPL-MCNC: 4.6 G/DL (ref 3.5–5)
ALBUMIN/GLOB SERPL: 1.4 G/DL (ref 1.1–2.5)
ALP SERPL-CCNC: 57 U/L (ref 24–120)
ALT SERPL W P-5'-P-CCNC: 15 U/L (ref 0–35)
ANION GAP SERPL CALCULATED.3IONS-SCNC: 5 MMOL/L (ref 4–13)
AST SERPL-CCNC: 16 U/L (ref 7–45)
AUTO MIXED CELLS #: 0.5 10*3/MM3 (ref 0.1–2.6)
AUTO MIXED CELLS %: 9.2 % (ref 0.1–24)
BILIRUB SERPL-MCNC: 0.8 MG/DL (ref 0.1–1)
BUN SERPL-MCNC: 8 MG/DL (ref 5–21)
BUN/CREAT SERPL: 15.4
CALCIUM SPEC-SCNC: 9.6 MG/DL (ref 8.4–10.4)
CHLORIDE SERPL-SCNC: 101 MMOL/L (ref 98–110)
CO2 SERPL-SCNC: 30 MMOL/L (ref 24–31)
CREAT SERPL-MCNC: 0.52 MG/DL (ref 0.5–1.4)
EGFRCR SERPLBLD CKD-EPI 2021: 122.1 ML/MIN/1.73
ERYTHROCYTE [DISTWIDTH] IN BLOOD BY AUTOMATED COUNT: 12.5 % (ref 12.3–15.4)
GLOBULIN UR ELPH-MCNC: 3.2 GM/DL
GLUCOSE SERPL-MCNC: 107 MG/DL (ref 70–100)
HCT VFR BLD AUTO: 43.4 % (ref 34–46.6)
HGB BLD-MCNC: 14.5 G/DL (ref 12–15.9)
LYMPHOCYTES # BLD AUTO: 1.3 10*3/MM3 (ref 0.7–3.1)
LYMPHOCYTES NFR BLD AUTO: 22.4 % (ref 19.6–45.3)
MCH RBC QN AUTO: 29.2 PG (ref 26.6–33)
MCHC RBC AUTO-ENTMCNC: 33.4 G/DL (ref 31.5–35.7)
MCV RBC AUTO: 87.5 FL (ref 79–97)
NEUTROPHILS NFR BLD AUTO: 3.9 10*3/MM3 (ref 1.7–7)
NEUTROPHILS NFR BLD AUTO: 68.4 % (ref 42.7–76)
PLATELET # BLD AUTO: 371 10*3/MM3 (ref 140–450)
PMV BLD AUTO: 10 FL (ref 6–12)
POTASSIUM SERPL-SCNC: 3.9 MMOL/L (ref 3.5–5.3)
PROT SERPL-MCNC: 7.8 G/DL (ref 6.3–8.7)
RBC # BLD AUTO: 4.96 10*6/MM3 (ref 3.77–5.28)
SODIUM SERPL-SCNC: 136 MMOL/L (ref 135–145)
WBC NRBC COR # BLD: 5.7 10*3/MM3 (ref 3.4–10.8)

## 2022-03-21 PROCEDURE — 93971 EXTREMITY STUDY: CPT

## 2022-03-21 PROCEDURE — 80053 COMPREHEN METABOLIC PANEL: CPT

## 2022-03-21 PROCEDURE — 36415 COLL VENOUS BLD VENIPUNCTURE: CPT

## 2022-03-21 PROCEDURE — 99214 OFFICE O/P EST MOD 30 MIN: CPT

## 2022-03-21 PROCEDURE — 85025 COMPLETE CBC W/AUTO DIFF WBC: CPT

## 2022-03-21 NOTE — PROGRESS NOTES
"Chief Complaint  Leg Swelling    Subjective          Mago Gamboa presents to [facility]  Right leg swelling and cyst on R knee       Objective   Vital Signs:   /60 (BP Location: Left arm, Patient Position: Sitting, Cuff Size: Adult)   Pulse 68   Temp 98.5 °F (36.9 °C)   Ht 170.2 cm (67\")   Wt 50.6 kg (111 lb 9.6 oz)   SpO2 99%   BMI 17.48 kg/m²     Physical Exam  Constitutional:       Appearance: Normal appearance. She is well-developed.   HENT:      Head: Normocephalic and atraumatic.      Right Ear: Tympanic membrane, ear canal and external ear normal.      Left Ear: Tympanic membrane, ear canal and external ear normal.      Nose: Nose normal. No septal deviation, nasal tenderness or congestion.      Mouth/Throat:      Lips: Pink. No lesions.      Mouth: Mucous membranes are moist. No oral lesions.      Dentition: Normal dentition.      Pharynx: Oropharynx is clear. No pharyngeal swelling, oropharyngeal exudate or posterior oropharyngeal erythema.   Eyes:      General: Lids are normal. Vision grossly intact. No scleral icterus.        Right eye: No discharge.         Left eye: No discharge.      Extraocular Movements: Extraocular movements intact.      Conjunctiva/sclera: Conjunctivae normal.      Right eye: Right conjunctiva is not injected.      Left eye: Left conjunctiva is not injected.      Pupils: Pupils are equal, round, and reactive to light.   Neck:      Thyroid: No thyroid mass.      Trachea: Trachea normal.   Cardiovascular:      Rate and Rhythm: Normal rate and regular rhythm.      Heart sounds: Normal heart sounds. No murmur heard.    No gallop.   Pulmonary:      Effort: Pulmonary effort is normal.      Breath sounds: Normal breath sounds and air entry. No wheezing, rhonchi or rales.   Abdominal:      General: There is no distension.      Palpations: Abdomen is soft. There is no mass.      Tenderness: There is no abdominal tenderness. There is no right " CVA tenderness, left CVA tenderness, guarding or rebound.   Musculoskeletal:         General: No tenderness or deformity. Normal range of motion.      Cervical back: Full passive range of motion without pain, normal range of motion and neck supple.      Thoracic back: Normal.      Right lower leg: Swelling present. No edema.      Left lower leg: No edema.      Comments: Fluid filled cyst on outer portion of R knee    Skin:     General: Skin is warm and dry.      Coloration: Skin is not jaundiced.      Findings: No rash.   Neurological:      Mental Status: She is alert and oriented to person, place, and time.      Cranial Nerves: Cranial nerves are intact.      Sensory: Sensation is intact.      Motor: Motor function is intact.      Coordination: Coordination is intact.      Gait: Gait is intact.      Deep Tendon Reflexes: Reflexes are normal and symmetric.   Psychiatric:         Mood and Affect: Mood and affect normal.         Judgment: Judgment normal.        Result Review :{Labs  Result Review  Imaging  Med Tab  Media  Procedures :23}                 Assessment and Plan    Diagnoses and all orders for this visit:    1. Leg swelling (Primary)  -     US Venous Doppler Lower Extremity Right (duplex); Future  -     CBC w AUTO Differential; Future  -     Comprehensive metabolic panel; Future  -     D-dimer, Quantitative; Future    2. Ganglion cyst    3. Pain in right lower leg    Patient is here today with c/o R leg swelling and cyst on outer R knee. Patient states that the swelling began on Friday and has not improved. She also has a fluid filled cyst on the outer R of knee.     Plan  1. Consulted Dr. Feliciano regarding cyst in which she just finished medrol octaviano for. On exam Dr. Feliciano did pop the cyst in the office today.   2. Patient requesting studies for leg swelling; US doppler done today, and routine lab work. Patient is requesting a D-Dimer but I discussed with her that I did not feel this was necessary but  she was insistent that this lab be obtained.         Follow Up   Return if symptoms worsen or fail to improve.  Patient was given instructions and counseling regarding her condition or for health maintenance advice. Please see specific information pulled into the AVS if appropriate.

## 2022-03-24 ENCOUNTER — OFFICE VISIT (OUTPATIENT)
Dept: INTERNAL MEDICINE | Facility: CLINIC | Age: 37
End: 2022-03-24

## 2022-03-24 VITALS
OXYGEN SATURATION: 99 % | DIASTOLIC BLOOD PRESSURE: 85 MMHG | WEIGHT: 114 LBS | HEIGHT: 67 IN | TEMPERATURE: 97.8 F | HEART RATE: 75 BPM | BODY MASS INDEX: 17.89 KG/M2 | SYSTOLIC BLOOD PRESSURE: 119 MMHG | RESPIRATION RATE: 18 BRPM

## 2022-03-24 DIAGNOSIS — R60.0 EDEMA OF RIGHT LOWER LEG: Primary | ICD-10-CM

## 2022-03-24 PROCEDURE — 99213 OFFICE O/P EST LOW 20 MIN: CPT | Performed by: NURSE PRACTITIONER

## 2022-03-24 NOTE — PROGRESS NOTES
Subjective     Chief Complaint   Patient presents with   • Leg Swelling     Right Leg       History of Present Illness  Patient presents today with right leg swelling. Onset of one week. Denies pain or trouble walking. She went to Urgent Care at [facility]. Testing for labs and a venous Doppler were both negative for acute process.  She did have a blood clot.  Dr. Feliciano did manually pop the cyst on the right outer before she got back even to her vehicle is started swelling again.  That has persisted and is not as bad as it was prior.  She concerned about pretibial edema.  Last TSH was normal as well.     Review of Systems   Constitutional: Negative for activity change, appetite change, chills and fever.   HENT: Negative for hearing loss, nosebleeds, tinnitus and trouble swallowing.    Eyes: Negative for visual disturbance.   Respiratory: Negative for cough, chest tightness, shortness of breath and wheezing.    Cardiovascular: Negative for chest pain, palpitations and leg swelling.   Gastrointestinal: Negative for abdominal distention, abdominal pain, blood in stool, constipation, diarrhea, nausea and vomiting.   Endocrine: Negative for cold intolerance, heat intolerance, polydipsia, polyphagia and polyuria.   Genitourinary: Negative for decreased urine volume, difficulty urinating, dysuria, flank pain, frequency and hematuria.   Musculoskeletal: Negative for arthralgias, joint swelling and myalgias.   Skin: Negative for rash.   Allergic/Immunologic: Negative for immunocompromised state.   Neurological: Negative for dizziness, syncope, weakness, light-headedness and headaches.   Hematological: Negative for adenopathy. Does not bruise/bleed easily.   Psychiatric/Behavioral: Negative for confusion and sleep disturbance. The patient is not nervous/anxious.         Otherwise complete ROS reviewed and negative except as mentioned in the HPI.    Past Medical History:   Past Medical History:    Diagnosis Date   • Abnormal ECG    • Allergic 2005   • Anxiety 2010   • Arrhythmia    • Asthma, extrinsic    • Gastroparesis 12/23/2021   • GERD (gastroesophageal reflux disease)    • Globus sensation 6/15/2021   • Headache 2002   • Intractable nausea and vomiting 6/15/2021   • Mild intermittent asthma without complication 6/15/2021   • Tachycardia      Past Surgical History:  Past Surgical History:   Procedure Laterality Date   • ADENOIDECTOMY     • APPENDECTOMY     • CARDIAC SURGERY      loop recorder   • CHOLECYSTECTOMY     • COLONOSCOPY  2015   • ENDOMETRIAL ABLATION     • ENDOSCOPY N/A 7/26/2021    Procedure: ESOPHAGOGASTRODUODENOSCOPY WITH ANESTHESIA;  Surgeon: Keegan Pope DO;  Location: [facility] ENDOSCOPY;  Service: Gastroenterology;  Laterality: N/A;  pre GERD  post  jesus varela APRN   • HYSTERECTOMY     • SUBTOTAL HYSTERECTOMY  2016   • TONSILLECTOMY       Social History:  reports that she has never smoked. She has never used smokeless tobacco. She reports previous alcohol use. She reports that she does not use drugs.    Family History: family history includes COPD in her maternal grandmother; Cancer in her maternal grandfather; Heart disease in her maternal grandmother; No Known Problems in her father and mother; Stroke in her maternal grandfather. She was adopted.       Allergies:  Allergies   Allergen Reactions   • Compazine [Prochlorperazine] Palpitations   • Iodinated Diagnostic Agents Anaphylaxis   • Nortriptyline Hcl Rash   • Sulfa Antibiotics Angioedema, Other (See Comments) and Rash     breathing difficulty     • Proton Pump Inhibitors Swelling     Possible esophageal tightness  Possible esophageal tightness     Medications:  Prior to Admission medications    Medication Sig Start Date End Date Taking? Authorizing Provider   albuterol (PROVENTIL) (2.5 MG/3ML) 0.083% nebulizer solution INHALE 3 ML PO Q 4 - 6 H PRM   Yes Provider, MD Albaro   albuterol sulfate  (90 Base) MCG/ACT  "inhaler Inhale 2 puffs Every 4 (Four) Hours As Needed for Wheezing. 9/3/21  Yes Jesusita Patterson APRN   ALPRAZolam (XANAX) 0.5 MG tablet Take 1 tablet by mouth 2 (Two) Times a Day As Needed for Anxiety. for anxiety 3/3/22  Yes Jesusita Patterson APRN   Ascorbic Acid (DEANN-C PO) Take  by mouth.   Yes ProviderAlbaro MD   Cholecalciferol (Vitamin D3) 50 MCG (2000 UT) capsule Take 2,000 Units by mouth Daily.   Yes Provider, MD Albaro   esomeprazole (nexIUM) 40 MG capsule Take 1 capsule by mouth 2 (Two) Times a Day.  Patient taking differently: Take 40 mg by mouth Daily. 1/3/22  Yes Jesusita Patterson APRN   famotidine (PEPCID) 20 MG tablet Take 20 mg by mouth At Night As Needed for Heartburn.   Yes ProviderAlbaro MD   methylPREDNISolone (MEDROL) 4 MG dose pack Take as directed on package instructions. 3/10/22  Yes Jesusita Patterson APRN   Multiple Vitamins-Minerals (ZINC PO) Take  by mouth.   Yes Provider, MD Albaro   mupirocin (BACTROBAN) 2 % ointment Apply 1 application topically to the appropriate area as directed 3 (Three) Times a Day. 12/29/21  Yes Leonarda Schafer DO   ondansetron ODT (ZOFRAN-ODT) 8 MG disintegrating tablet Place 1 tablet on the tongue Every 8 (Eight) Hours As Needed for Nausea or Vomiting. 12/2/21  Yes Jesusita Patterson APRN   traZODone (DESYREL) 50 MG tablet Take 50 mg by mouth Daily. 3/1/22 8/29/22 Yes ProviderAlbaro MD   fluticasone-salmeterol (ADVAIR HFA) 115-21 MCG/ACT inhaler Inhale 2 puffs 2 (Two) Times a Day for 30 days. 9/23/21 11/30/21  Princess Rico APRN       Objective     Vital Signs: /85 (BP Location: Right arm, Patient Position: Sitting)   Pulse 75   Temp 97.8 °F (36.6 °C)   Resp 18   Ht 170.2 cm (67\")   Wt 51.7 kg (114 lb)   SpO2 99%   BMI 17.85 kg/m²   Physical Exam  Vitals and nursing note reviewed.   Constitutional:       Appearance: She is well-developed.   HENT:      Head: Normocephalic and " atraumatic.   Eyes:      Pupils: Pupils are equal, round, and reactive to light.   Neck:      Vascular: No JVD.   Cardiovascular:      Rate and Rhythm: Normal rate and regular rhythm.   Pulmonary:      Effort: Pulmonary effort is normal.   Abdominal:      General: Bowel sounds are normal.      Palpations: Abdomen is soft.   Musculoskeletal:         General: No deformity.      Cervical back: Normal range of motion and neck supple.      Right lower leg: Swelling present.        Legs:    Lymphadenopathy:      Cervical: No cervical adenopathy.   Skin:     General: Skin is warm and dry.   Neurological:      Mental Status: She is alert and oriented to person, place, and time.   Psychiatric:         Behavior: Behavior normal.         Thought Content: Thought content normal.         Judgment: Judgment normal.         Patient's Body mass index is 17.85 kg/m². indicating that she is underweight (BMI < 18.5). Recommendations include: referral to primary care.      Results Reviewed:  Glucose   Date Value Ref Range Status   03/21/2022 107 (H) 70 - 100 mg/dL Final     BUN   Date Value Ref Range Status   03/21/2022 8 5 - 21 mg/dL Final     Creatinine   Date Value Ref Range Status   03/21/2022 0.52 0.50 - 1.40 mg/dL Final     Sodium   Date Value Ref Range Status   03/21/2022 136 135 - 145 mmol/L Final     Potassium   Date Value Ref Range Status   03/21/2022 3.9 3.5 - 5.3 mmol/L Final     Chloride   Date Value Ref Range Status   03/21/2022 101 98 - 110 mmol/L Final     CO2   Date Value Ref Range Status   03/21/2022 30.0 24.0 - 31.0 mmol/L Final     Calcium   Date Value Ref Range Status   03/21/2022 9.6 8.4 - 10.4 mg/dL Final     ALT (SGPT)   Date Value Ref Range Status   03/21/2022 15 0 - 35 U/L Final     AST (SGOT)   Date Value Ref Range Status   03/21/2022 16 7 - 45 U/L Final     WBC   Date Value Ref Range Status   03/21/2022 5.70 3.40 - 10.80 10*3/mm3 Final   07/22/2021 4.7 3.4 - 10.8 x10E3/uL Final   05/24/2018 5.3 4.0 - 10.5  10*3/uL Final     Hematocrit   Date Value Ref Range Status   03/21/2022 43.4 34.0 - 46.6 % Final   05/24/2018 40.0 37.0 - 47.0 % Final     Platelets   Date Value Ref Range Status   03/21/2022 371 140 - 450 10*3/mm3 Final   05/24/2018 295 150 - 450 10*3/uL Final     Triglycerides   Date Value Ref Range Status   12/02/2021 57 0 - 149 mg/dL Final     HDL Cholesterol   Date Value Ref Range Status   12/02/2021 55 >39 mg/dL Final     LDL Chol Calc ([facility])   Date Value Ref Range Status   12/02/2021 70 0 - 99 mg/dL Final         Assessment / Plan     Assessment/Plan:  Diagnoses and all orders for this visit:    1. Edema of right lower leg (Primary)  -     US Soft Tissue    Will await ultrasound results and proceed from there    No follow-ups on file. keep follow up in June.     Code Status: Full.     I have discussed the patient results/orders and and plan/recommendation with them at today's visit.      Jesusita Patterson, APRN   03/24/2022

## 2022-03-29 ENCOUNTER — PATIENT MESSAGE (OUTPATIENT)
Dept: INTERNAL MEDICINE | Facility: CLINIC | Age: 37
End: 2022-03-29

## 2022-04-01 DIAGNOSIS — L50.9 HIVES: Primary | ICD-10-CM

## 2022-04-01 DIAGNOSIS — R22.0 MOUTH SWELLING: ICD-10-CM

## 2022-04-04 ENCOUNTER — LAB (OUTPATIENT)
Dept: LAB | Facility: HOSPITAL | Age: 37
End: 2022-04-04

## 2022-04-04 DIAGNOSIS — R22.0 MOUTH SWELLING: ICD-10-CM

## 2022-04-04 DIAGNOSIS — L50.9 HIVES: ICD-10-CM

## 2022-04-04 DIAGNOSIS — L50.9 HIVES: Primary | ICD-10-CM

## 2022-04-04 PROCEDURE — 86003 ALLG SPEC IGE CRUDE XTRC EA: CPT

## 2022-04-04 PROCEDURE — 36415 COLL VENOUS BLD VENIPUNCTURE: CPT

## 2022-04-09 LAB
CLAM IGE QN: <0.1 KU/L
CODFISH IGE QN: <0.1 KU/L
CONV CLASS DESCRIPTION: NORMAL
CORN IGE QN: <0.1 KU/L
COW MILK IGE QN: <0.1 KU/L
EGG WHITE IGE QN: <0.1 KU/L
PEANUT IGE QN: <0.1 KU/L
SCALLOP IGE QN: <0.1 KU/L
SESAME SEED IGE QN: <0.1 KU/L
SHRIMP IGE QN: <0.1 KU/L
SOYBEAN IGE QN: <0.1 KU/L
WALNUT IGE QN: <0.1 KU/L
WHEAT IGE QN: <0.1 KU/L

## 2022-04-22 ENCOUNTER — TELEMEDICINE (OUTPATIENT)
Dept: INTERNAL MEDICINE | Facility: CLINIC | Age: 37
End: 2022-04-22

## 2022-04-22 DIAGNOSIS — I88.9 LYMPHADENITIS: ICD-10-CM

## 2022-04-22 DIAGNOSIS — M25.561 ACUTE PAIN OF RIGHT KNEE: Primary | ICD-10-CM

## 2022-04-22 PROCEDURE — 99213 OFFICE O/P EST LOW 20 MIN: CPT | Performed by: NURSE PRACTITIONER

## 2022-04-22 RX ORDER — PREDNISONE 20 MG/1
20 TABLET ORAL DAILY
Qty: 7 TABLET | Refills: 0 | Status: SHIPPED | OUTPATIENT
Start: 2022-04-22 | End: 2022-06-23

## 2022-04-22 NOTE — PROGRESS NOTES
Subjective     Chief Complaint   Patient presents with   • Knee Pain       History of Present Illness  Video visit was obtained today. Pt was at home. Provider was in clinic. She calls in today with ongoing right knee pain. States is a sharp pain on lateral side of knee. She has good range of motion.     In addition, she has posterior neck lymph nodes and neck pain. No congestion. She has been vomiting/nausea this am.     Review of Systems   Otherwise complete ROS reviewed and negative except as mentioned in the HPI.    Past Medical History:   Past Medical History:   Diagnosis Date   • Abnormal ECG    • Allergic 2005   • Anxiety 2010   • Arrhythmia    • Asthma, extrinsic    • Gastroparesis 12/23/2021   • GERD (gastroesophageal reflux disease)    • Globus sensation 6/15/2021   • Headache 2002   • Intractable nausea and vomiting 6/15/2021   • Mild intermittent asthma without complication 6/15/2021   • Tachycardia      Past Surgical History:  Past Surgical History:   Procedure Laterality Date   • ADENOIDECTOMY     • APPENDECTOMY     • CARDIAC SURGERY      loop recorder   • CHOLECYSTECTOMY     • COLONOSCOPY  2015   • ENDOMETRIAL ABLATION     • ENDOSCOPY N/A 7/26/2021    Procedure: ESOPHAGOGASTRODUODENOSCOPY WITH ANESTHESIA;  Surgeon: Keegan Pope DO;  Location: [facility] ENDOSCOPY;  Service: Gastroenterology;  Laterality: N/A;  pre GERD  post  jesus varela APRN   • HYSTERECTOMY     • SUBTOTAL HYSTERECTOMY  2016   • TONSILLECTOMY       Social History:  reports that she has never smoked. She has never used smokeless tobacco. She reports previous alcohol use. She reports that she does not use drugs.    Family History: family history includes COPD in her maternal grandmother; Cancer in her maternal grandfather; Heart disease in her maternal grandmother; No Known Problems in her father and mother; Stroke in her maternal grandfather. She was adopted.      Allergies:  Allergies   Allergen Reactions   • Compazine  [Prochlorperazine] Palpitations   • Iodinated Diagnostic Agents Anaphylaxis   • Nortriptyline Hcl Rash   • Sulfa Antibiotics Angioedema, Other (See Comments) and Rash     breathing difficulty     • Proton Pump Inhibitors Swelling     Possible esophageal tightness  Possible esophageal tightness     Medications:  Prior to Admission medications    Medication Sig Start Date End Date Taking? Authorizing Provider   albuterol (PROVENTIL) (2.5 MG/3ML) 0.083% nebulizer solution INHALE 3 ML PO Q 4 - 6 H PRM    ProviderAlbaro MD   albuterol sulfate  (90 Base) MCG/ACT inhaler Inhale 2 puffs Every 4 (Four) Hours As Needed for Wheezing. 9/3/21   Jesusita Patterson APRN   ALPRAZolam (XANAX) 0.5 MG tablet Take 1 tablet by mouth 2 (Two) Times a Day As Needed for Anxiety. for anxiety 3/3/22   Jesusita Patterson APRN   Ascorbic Acid (DEANN-C PO) Take  by mouth.    ProviderAlbaro MD   Cholecalciferol (Vitamin D3) 50 MCG (2000 UT) capsule Take 2,000 Units by mouth Daily.    ProviderAlbaro MD   esomeprazole (nexIUM) 40 MG capsule Take 1 capsule by mouth 2 (Two) Times a Day.  Patient taking differently: Take 40 mg by mouth Daily. 1/3/22   Jesusita Patterson APRN   famotidine (PEPCID) 20 MG tablet Take 20 mg by mouth At Night As Needed for Heartburn.    ProviderAlbaro MD   fluticasone-salmeterol (ADVAIR HFA) 115-21 MCG/ACT inhaler Inhale 2 puffs 2 (Two) Times a Day for 30 days. 9/23/21 11/30/21  Princess Rico APRN   methylPREDNISolone (MEDROL) 4 MG dose pack Take as directed on package instructions. 3/10/22   Jesusita Patterson APRN   Multiple Vitamins-Minerals (ZINC PO) Take  by mouth.    ProviderAlbaro MD   mupirocin (BACTROBAN) 2 % ointment Apply 1 application topically to the appropriate area as directed 3 (Three) Times a Day. 12/29/21   Leonarda Schafer DO   ondansetron ODT (ZOFRAN-ODT) 8 MG disintegrating tablet Place 1 tablet on the tongue Every 8 (Eight) Hours  As Needed for Nausea or Vomiting. 12/2/21   Jesusita Patterson APRN   traZODone (DESYREL) 50 MG tablet Take 50 mg by mouth Daily. 3/1/22 8/29/22  Provider, MD Albaro       Objective     Vital Signs: There were no vitals taken for this visit.  Physical Exam  Constitutional:       Comments: Ill appearing.    HENT:      Head: Normocephalic.   Eyes:      Extraocular Movements: EOM normal.      Pupils: Pupils are equal, round, and reactive to light.   Pulmonary:      Effort: Pulmonary effort is normal.   Musculoskeletal:      Cervical back: Normal range of motion.   Neurological:      Mental Status: She is alert.         Results Reviewed:  Glucose   Date Value Ref Range Status   03/21/2022 107 (H) 70 - 100 mg/dL Final     BUN   Date Value Ref Range Status   03/21/2022 8 5 - 21 mg/dL Final     Creatinine   Date Value Ref Range Status   03/21/2022 0.52 0.50 - 1.40 mg/dL Final     Sodium   Date Value Ref Range Status   03/21/2022 136 135 - 145 mmol/L Final     Potassium   Date Value Ref Range Status   03/21/2022 3.9 3.5 - 5.3 mmol/L Final     Chloride   Date Value Ref Range Status   03/21/2022 101 98 - 110 mmol/L Final     CO2   Date Value Ref Range Status   03/21/2022 30.0 24.0 - 31.0 mmol/L Final     Calcium   Date Value Ref Range Status   03/21/2022 9.6 8.4 - 10.4 mg/dL Final     ALT (SGPT)   Date Value Ref Range Status   03/21/2022 15 0 - 35 U/L Final     AST (SGOT)   Date Value Ref Range Status   03/21/2022 16 7 - 45 U/L Final     WBC   Date Value Ref Range Status   03/21/2022 5.70 3.40 - 10.80 10*3/mm3 Final   07/22/2021 4.7 3.4 - 10.8 x10E3/uL Final   05/24/2018 5.3 4.0 - 10.5 10*3/uL Final     Hematocrit   Date Value Ref Range Status   03/21/2022 43.4 34.0 - 46.6 % Final   05/24/2018 40.0 37.0 - 47.0 % Final     Platelets   Date Value Ref Range Status   03/21/2022 371 140 - 450 10*3/mm3 Final   05/24/2018 295 150 - 450 10*3/uL Final     Triglycerides   Date Value Ref Range Status   12/02/2021 57 0 - 149  mg/dL Final     HDL Cholesterol   Date Value Ref Range Status   12/02/2021 55 >39 mg/dL Final     LDL Chol Calc ([facility])   Date Value Ref Range Status   12/02/2021 70 0 - 99 mg/dL Final         Assessment / Plan     Assessment/Plan:  Diagnoses and all orders for this visit:    1. Acute pain of right knee (Primary)  -     predniSONE (DELTASONE) 20 MG tablet; Take 1 tablet by mouth Daily.  Dispense: 7 tablet; Refill: 0    2. Lymphadenitis  -     predniSONE (DELTASONE) 20 MG tablet; Take 1 tablet by mouth Daily.  Dispense: 7 tablet; Refill: 0    This visit has been rescheduled as a phone visit to comply with patient safety concerns in accordance with CDC recommendations. Total time of discussion was 12 minutes.    No follow-ups on file. pt instructed to send me a [employer] message about her progress on Monday.     Code Status: Full.     I have discussed the patient results/orders and and plan/recommendation with them at today's visit.      Jesusita Patterson, APRN   04/22/2022

## 2022-04-26 DIAGNOSIS — M79.661 PAIN IN RIGHT LOWER LEG: Primary | ICD-10-CM

## 2022-04-27 ENCOUNTER — HOSPITAL ENCOUNTER (OUTPATIENT)
Dept: GENERAL RADIOLOGY | Facility: HOSPITAL | Age: 37
Discharge: HOME | End: 2022-04-27
Admitting: NURSE PRACTITIONER

## 2022-04-27 DIAGNOSIS — M79.661 PAIN IN RIGHT LOWER LEG: ICD-10-CM

## 2022-04-27 PROCEDURE — 73560 X-RAY EXAM OF KNEE 1 OR 2: CPT

## 2022-05-10 ENCOUNTER — OFFICE VISIT (OUTPATIENT)
Dept: INTERNAL MEDICINE | Facility: CLINIC | Age: 37
End: 2022-05-10

## 2022-05-10 VITALS
OXYGEN SATURATION: 100 % | RESPIRATION RATE: 20 BRPM | SYSTOLIC BLOOD PRESSURE: 107 MMHG | DIASTOLIC BLOOD PRESSURE: 73 MMHG | WEIGHT: 114 LBS | BODY MASS INDEX: 17.89 KG/M2 | TEMPERATURE: 98.2 F | HEIGHT: 67 IN | HEART RATE: 104 BPM

## 2022-05-10 DIAGNOSIS — Z79.899 ENCOUNTER FOR LONG-TERM (CURRENT) USE OF OTHER MEDICATIONS: Primary | ICD-10-CM

## 2022-05-10 DIAGNOSIS — R59.0 POSTERIOR CERVICAL LYMPHADENOPATHY: ICD-10-CM

## 2022-05-10 DIAGNOSIS — I88.9 LYMPHADENITIS: ICD-10-CM

## 2022-05-10 DIAGNOSIS — F41.9 ANXIETY: ICD-10-CM

## 2022-05-10 DIAGNOSIS — M71.30: ICD-10-CM

## 2022-05-10 DIAGNOSIS — Z12.31 ENCOUNTER FOR SCREENING MAMMOGRAM FOR BREAST CANCER: ICD-10-CM

## 2022-05-10 PROCEDURE — 99214 OFFICE O/P EST MOD 30 MIN: CPT | Performed by: NURSE PRACTITIONER

## 2022-05-10 RX ORDER — ALPRAZOLAM 0.5 MG/1
0.5 TABLET ORAL 2 TIMES DAILY PRN
Qty: 60 TABLET | Refills: 0 | Status: SHIPPED | OUTPATIENT
Start: 2022-05-10 | End: 2022-09-15 | Stop reason: SDUPTHER

## 2022-05-10 RX ORDER — SERTRALINE HYDROCHLORIDE 25 MG/1
25 TABLET, FILM COATED ORAL DAILY
Qty: 30 TABLET | Refills: 1 | Status: SHIPPED | OUTPATIENT
Start: 2022-05-10 | End: 2022-08-04 | Stop reason: SDUPTHER

## 2022-05-10 NOTE — PROGRESS NOTES
Subjective     Chief Complaint   Patient presents with   • Knee Pain     Nodules   • Neck Pain     Nodules     • Anxiety       History of Present Illness  Pain in right knee. Nodule in lateral area of right knee. States she went to urgent care clinic about a month ago and they popped the nodule. X-ray from 4/27/22 showed possible enchondroma.   Pt [facility] 5/12/22 at 3 PM.     Right sided neck pain with palpable nodule. She did a course of steroids 4/22/22. It is smaller but continues. Ultrasound prior lymph node.     Previously took nortriptyline before for anxiety but had a rash and a swollen tongue. Wishes to talk about other options for her anxiety. Has tried Zoloft in the past. Denies depression.       Review of Systems   Constitutional: Negative.    HENT: Negative.    Eyes: Negative.    Gastrointestinal: Negative.    Endocrine: Negative.    Genitourinary: Negative.    Musculoskeletal: Positive for arthralgias (right knee) and neck pain (right sided nodules).   Skin: Negative.    Allergic/Immunologic: Negative.    Neurological: Negative.    Hematological: Negative.    Psychiatric/Behavioral: The patient is nervous/anxious.       Otherwise complete ROS reviewed and negative except as mentioned in the HPI.    Past Medical History:   Past Medical History:   Diagnosis Date   • Abnormal ECG    • Allergic 2005   • Anxiety 2010   • Arrhythmia    • Asthma, extrinsic    • Gastroparesis 12/23/2021   • GERD (gastroesophageal reflux disease)    • Globus sensation 6/15/2021   • Headache 2002   • Intractable nausea and vomiting 6/15/2021   • Mild intermittent asthma without complication 6/15/2021   • Tachycardia      Past Surgical History:  Past Surgical History:   Procedure Laterality Date   • ADENOIDECTOMY     • APPENDECTOMY     • CARDIAC SURGERY      loop recorder   • CHOLECYSTECTOMY     • COLONOSCOPY  2015   • ENDOMETRIAL ABLATION     • ENDOSCOPY N/A 7/26/2021    Procedure:  ESOPHAGOGASTRODUODENOSCOPY WITH ANESTHESIA;  Surgeon: Keegan Pope DO;  Location: [facility] ENDOSCOPY;  Service: Gastroenterology;  Laterality: N/A;  pre GERD  post  jesusita BLOCK   • HYSTERECTOMY     • SUBTOTAL HYSTERECTOMY  2016   • TONSILLECTOMY       Social History:  reports that she has never smoked. She has never used smokeless tobacco. She reports previous alcohol use. She reports that she does not use drugs.    Family History: family history includes COPD in her maternal grandmother; Cancer in her maternal grandfather; Heart disease in her maternal grandmother; No Known Problems in her father and mother; Stroke in her maternal grandfather. She was adopted.       Allergies:  Allergies   Allergen Reactions   • Compazine [Prochlorperazine] Palpitations   • Iodinated Diagnostic Agents Anaphylaxis   • Nortriptyline Hcl Rash   • Sulfa Antibiotics Angioedema, Other (See Comments) and Rash     breathing difficulty     • Proton Pump Inhibitors Swelling     Possible esophageal tightness  Possible esophageal tightness     Medications:  Prior to Admission medications    Medication Sig Start Date End Date Taking? Authorizing Provider   albuterol (PROVENTIL) (2.5 MG/3ML) 0.083% nebulizer solution INHALE 3 ML PO Q 4 - 6 H PRM   Yes Albaro Kenyon MD   albuterol sulfate  (90 Base) MCG/ACT inhaler Inhale 2 puffs Every 4 (Four) Hours As Needed for Wheezing. 9/3/21  Yes Jesusita Patterson APRN   ALPRAZolam (XANAX) 0.5 MG tablet Take 1 tablet by mouth 2 (Two) Times a Day As Needed for Anxiety. for anxiety 3/3/22  Yes Jesusita Pattersno APRN   Ascorbic Acid (DEANN-C PO) Take  by mouth.   Yes Albaro Kenyon MD   Cholecalciferol (Vitamin D3) 50 MCG (2000 UT) capsule Take 2,000 Units by mouth Daily.   Yes Albaro Kenyon MD   famotidine (PEPCID) 20 MG tablet Take 20 mg by mouth At Night As Needed for Heartburn.   Yes Albaro Kenyon MD   Multiple Vitamins-Minerals (ZINC PO) Take  " by mouth.   Yes Provider, MD Albaro   ondansetron ODT (ZOFRAN-ODT) 8 MG disintegrating tablet Place 1 tablet on the tongue Every 8 (Eight) Hours As Needed for Nausea or Vomiting. 12/2/21  Yes Jesusita Patterson APRN   predniSONE (DELTASONE) 20 MG tablet Take 1 tablet by mouth Daily. 4/22/22  Yes Jesusita Patterson APRN   esomeprazole (nexIUM) 40 MG capsule Take 1 capsule by mouth 2 (Two) Times a Day.  Patient taking differently: Take 40 mg by mouth Daily. 1/3/22   Jesusita Patterson APRN   fluticasone-salmeterol (ADVAIR HFA) 115-21 MCG/ACT inhaler Inhale 2 puffs 2 (Two) Times a Day for 30 days. 9/23/21 11/30/21  Princess Rico APRN   mupirocin (BACTROBAN) 2 % ointment Apply 1 application topically to the appropriate area as directed 3 (Three) Times a Day. 12/29/21   Leonarda Schafer DO   methylPREDNISolone (MEDROL) 4 MG dose pack Take as directed on package instructions. 3/10/22 5/10/22  Jesusita Patterson APRN   traZODone (DESYREL) 50 MG tablet Take 50 mg by mouth Daily. 3/1/22 5/10/22  Provider, MD Albaro       Objective     Vital Signs: /73 (BP Location: Left arm, Patient Position: Sitting, Cuff Size: Adult)   Pulse 104   Temp 98.2 °F (36.8 °C)   Resp 20   Ht 170.2 cm (67\")   Wt 51.7 kg (114 lb)   SpO2 100%   BMI 17.85 kg/m²   Physical Exam  Vitals reviewed.   Constitutional:       Appearance: She is well-developed.   HENT:      Head: Normocephalic and atraumatic.   Eyes:      Pupils: Pupils are equal, round, and reactive to light.   Neck:      Vascular: No JVD.   Cardiovascular:      Rate and Rhythm: Normal rate and regular rhythm.   Pulmonary:      Effort: Pulmonary effort is normal.   Abdominal:      General: Bowel sounds are normal.      Palpations: Abdomen is soft.   Musculoskeletal:         General: Tenderness (nodules to right knee and right neck) and deformity (nodule to right knee and right side of neck) present. No swelling.      Cervical back: " Normal range of motion and neck supple.      Right lower leg: No edema.      Left lower leg: No edema.   Lymphadenopathy:      Cervical: No cervical adenopathy.   Skin:     General: Skin is warm and dry.   Neurological:      General: No focal deficit present.      Mental Status: She is alert and oriented to person, place, and time. Mental status is at baseline.   Psychiatric:         Behavior: Behavior normal.         Thought Content: Thought content normal.         Judgment: Judgment normal.               Results Reviewed:  Glucose   Date Value Ref Range Status   03/21/2022 107 (H) 70 - 100 mg/dL Final     BUN   Date Value Ref Range Status   03/21/2022 8 5 - 21 mg/dL Final     Creatinine   Date Value Ref Range Status   03/21/2022 0.52 0.50 - 1.40 mg/dL Final     Sodium   Date Value Ref Range Status   03/21/2022 136 135 - 145 mmol/L Final     Potassium   Date Value Ref Range Status   03/21/2022 3.9 3.5 - 5.3 mmol/L Final     Chloride   Date Value Ref Range Status   03/21/2022 101 98 - 110 mmol/L Final     CO2   Date Value Ref Range Status   03/21/2022 30.0 24.0 - 31.0 mmol/L Final     Calcium   Date Value Ref Range Status   03/21/2022 9.6 8.4 - 10.4 mg/dL Final     ALT (SGPT)   Date Value Ref Range Status   03/21/2022 15 0 - 35 U/L Final     AST (SGOT)   Date Value Ref Range Status   03/21/2022 16 7 - 45 U/L Final     WBC   Date Value Ref Range Status   03/21/2022 5.70 3.40 - 10.80 10*3/mm3 Final   07/22/2021 4.7 3.4 - 10.8 x10E3/uL Final   05/24/2018 5.3 4.0 - 10.5 10*3/uL Final     Hematocrit   Date Value Ref Range Status   03/21/2022 43.4 34.0 - 46.6 % Final   05/24/2018 40.0 37.0 - 47.0 % Final     Platelets   Date Value Ref Range Status   03/21/2022 371 140 - 450 10*3/mm3 Final   05/24/2018 295 150 - 450 10*3/uL Final     Triglycerides   Date Value Ref Range Status   12/02/2021 57 0 - 149 mg/dL Final     HDL Cholesterol   Date Value Ref Range Status   12/02/2021 55 >39 mg/dL Final     LDL Chol Calc ([facility])   Date  Value Ref Range Status   12/02/2021 70 0 - 99 mg/dL Final         Assessment / Plan     Assessment/Plan:  Diagnoses and all orders for this visit:    1. Encounter for long-term (current) use of other medications (Primary)  -     Urine Drug Screen - Urine, Clean Catch    2. Lymphadenitis  -     CT Soft Tissue Neck Without Contrast; Future    3. Posterior cervical lymphadenopathy  -     CT Soft Tissue Neck Without Contrast; Future    4. Cyst of bursa  -     Cancel: Ambulatory Referral to Orthopedic Surgery  -     Ambulatory Referral to Orthopedic Surgery    5. Anxiety  -     sertraline (Zoloft) 25 MG tablet; Take 1 tablet by mouth Daily.  Dispense: 30 tablet; Refill: 1  -     ALPRAZolam (XANAX) 0.5 MG tablet; Take 1 tablet by mouth 2 (Two) Times a Day As Needed for Anxiety. for anxiety  Dispense: 60 tablet; Refill: 0    6. Encounter for screening mammogram for breast cancer  -     Mammo Screening Digital Tomosynthesis Bilateral With CAD; Future      Return in about 3 months (around 8/10/2022). unless patient needs to be seen sooner or acute issues arise.    Code Status: Full.     I have discussed the patient results/orders and and plan/recommendation with them at today's visit.      Jesusita Patterson, APRN   05/10/2022

## 2022-05-13 ENCOUNTER — HOSPITAL ENCOUNTER (OUTPATIENT)
Dept: CT IMAGING | Facility: HOSPITAL | Age: 37
Discharge: HOME | End: 2022-05-13
Admitting: NURSE PRACTITIONER

## 2022-05-13 DIAGNOSIS — I88.9 LYMPHADENITIS: ICD-10-CM

## 2022-05-13 DIAGNOSIS — R59.0 POSTERIOR CERVICAL LYMPHADENOPATHY: ICD-10-CM

## 2022-05-13 LAB
AMPHETAMINES UR QL SCN: NEGATIVE NG/ML
BARBITURATES UR QL SCN: NEGATIVE NG/ML
BENZODIAZ UR QL SCN: NEGATIVE NG/ML
BZE UR QL SCN: NEGATIVE NG/ML
CANNABINOIDS UR QL SCN: NEGATIVE NG/ML
CREAT UR-MCNC: 14.7 MG/DL (ref 20–300)
LABORATORY COMMENT REPORT: ABNORMAL
METHADONE UR QL SCN: NEGATIVE NG/ML
OPIATES UR QL SCN: NEGATIVE NG/ML
OXYCODONE+OXYMORPHONE UR QL SCN: NEGATIVE NG/ML
PCP UR QL: NEGATIVE NG/ML
PH UR: 6.5 [PH] (ref 4.5–8.9)
PROPOXYPH UR QL SCN: NEGATIVE NG/ML
SP GR UR: 1

## 2022-05-13 PROCEDURE — 70490 CT SOFT TISSUE NECK W/O DYE: CPT

## 2022-05-20 ENCOUNTER — TELEMEDICINE (OUTPATIENT)
Dept: INTERNAL MEDICINE | Facility: CLINIC | Age: 37
End: 2022-05-20

## 2022-05-20 DIAGNOSIS — T88.7XXA MEDICATION SIDE EFFECTS: Primary | ICD-10-CM

## 2022-05-20 PROCEDURE — 99213 OFFICE O/P EST LOW 20 MIN: CPT

## 2022-05-20 NOTE — PROGRESS NOTES
"        Subjective     Chief Complaint   Patient presents with   • Medication Problem     .This was an audio and video enabled telemedicine encounter.    Mode of Visit: Video  You have chosen to receive care through a telehealth visit.  The patient has signed the video visit consent form.  The visit included audio and video interaction. No technical issues occurred during this visit    I spent 20 minutes caring for Shelly on this date of service. This time includes time spent by me in the following activities: preparing for the visit, obtaining and/or reviewing a separately obtained history, performing a medically appropriate examination and/or evaluation , counseling and educating the patient.    History of Present Illness  Patient states that she started zoloft over the weekend and has been feeling a little nauseated and \"kind blah.\" States yesterday she noticed some tingling that started in her thumb and one of her fingers and went up to her elbow, almost like it went to sleep. States went to sleep and woke up and it was no longer present and has not come back. States she did also have a \"weird sensation\" of her tongue intermittently for the first couple of days ,denies any swelling or shortness of breath related to signs of angio edema. States also around her mouth she had a \"weird sensation\" around her mouth. No rashes.   Patient states she has taken Zoloft in the past and did ok with it. States she was on it 10 year ago.     Patient's PMR from outside medical facility reviewed and noted.    Review of Systems   Constitutional: Negative for activity change, chills, fatigue and unexpected weight change.   HENT: Negative for congestion, ear pain, mouth sores, postnasal drip, rhinorrhea and trouble swallowing.    Eyes: Negative for discharge and visual disturbance.   Respiratory: Negative for cough and shortness of breath.    Cardiovascular: Negative for chest pain and leg swelling.   Gastrointestinal: Negative for " abdominal pain, constipation, diarrhea and nausea.   Genitourinary: Negative for decreased urine volume, difficulty urinating and hematuria.   Musculoskeletal: Negative for back pain and gait problem.   Skin: Negative for color change and rash.   Allergic/Immunologic: Negative for environmental allergies and immunocompromised state.   Neurological: Negative for dizziness, speech difficulty, weakness, light-headedness and headaches.        Yesterday had tingling in left thumb and one of fingers, went away   Psychiatric/Behavioral: Positive for dysphoric mood. Negative for confusion and sleep disturbance. The patient is nervous/anxious.         Otherwise complete ROS reviewed and negative except as mentioned in the HPI.    Past Medical History:   Past Medical History:   Diagnosis Date   • Abnormal ECG    • Allergic 2005   • Anxiety 2010   • Arrhythmia    • Asthma, extrinsic    • Gastroparesis 12/23/2021   • GERD (gastroesophageal reflux disease)    • Globus sensation 6/15/2021   • Headache 2002   • Intractable nausea and vomiting 6/15/2021   • Mild intermittent asthma without complication 6/15/2021   • Tachycardia      Past Surgical History:  Past Surgical History:   Procedure Laterality Date   • ADENOIDECTOMY     • APPENDECTOMY     • CARDIAC SURGERY      loop recorder   • CHOLECYSTECTOMY     • COLONOSCOPY  2015   • ENDOMETRIAL ABLATION     • ENDOSCOPY N/A 7/26/2021    Procedure: ESOPHAGOGASTRODUODENOSCOPY WITH ANESTHESIA;  Surgeon: Keegan Pope DO;  Location: [facility] ENDOSCOPY;  Service: Gastroenterology;  Laterality: N/A;  pre GERD  post  jesus BLOCK   • HYSTERECTOMY     • SUBTOTAL HYSTERECTOMY  2016   • TONSILLECTOMY       Social History:  reports that she has never smoked. She has never used smokeless tobacco. She reports previous alcohol use. She reports that she does not use drugs.    Family History: family history includes COPD in her maternal grandmother; Cancer in her maternal grandfather; Heart  disease in her maternal grandmother; No Known Problems in her father and mother; Stroke in her maternal grandfather. She was adopted.      Allergies:  Allergies   Allergen Reactions   • Compazine [Prochlorperazine] Palpitations   • Iodinated Diagnostic Agents Anaphylaxis   • Nortriptyline Hcl Rash   • Sulfa Antibiotics Angioedema, Other (See Comments) and Rash     breathing difficulty     • Proton Pump Inhibitors Swelling     Possible esophageal tightness  Possible esophageal tightness     Medications:  Prior to Admission medications    Medication Sig Start Date End Date Taking? Authorizing Provider   albuterol (PROVENTIL) (2.5 MG/3ML) 0.083% nebulizer solution INHALE 3 ML PO Q 4 - 6 H PRM    ProviderAlbaro MD   albuterol sulfate  (90 Base) MCG/ACT inhaler Inhale 2 puffs Every 4 (Four) Hours As Needed for Wheezing. 9/3/21   Jesusita Patterson APRN   ALPRAZolam (XANAX) 0.5 MG tablet Take 1 tablet by mouth 2 (Two) Times a Day As Needed for Anxiety. for anxiety 5/10/22   Jesusita Patterson APRN   Ascorbic Acid (DEANN-C PO) Take  by mouth.    ProviderAlbaro MD   Cholecalciferol (Vitamin D3) 50 MCG (2000 UT) capsule Take 2,000 Units by mouth Daily.    ProviderAlbaro MD   esomeprazole (nexIUM) 40 MG capsule Take 1 capsule by mouth 2 (Two) Times a Day.  Patient taking differently: Take 40 mg by mouth Daily. 1/3/22   Jesusita Patterson APRN   famotidine (PEPCID) 20 MG tablet Take 20 mg by mouth At Night As Needed for Heartburn.    ProviderAlbaro MD   fluticasone-salmeterol (ADVAIR HFA) 115-21 MCG/ACT inhaler Inhale 2 puffs 2 (Two) Times a Day for 30 days. 9/23/21 11/30/21  Princess Rico APRN   Multiple Vitamins-Minerals (ZINC PO) Take  by mouth.    ProviderAlbaro MD   mupirocin (BACTROBAN) 2 % ointment Apply 1 application topically to the appropriate area as directed 3 (Three) Times a Day. 12/29/21   Leonarda Schafer DO   ondansetron ODT (ZOFRAN-ODT) 8 MG  disintegrating tablet Place 1 tablet on the tongue Every 8 (Eight) Hours As Needed for Nausea or Vomiting. 12/2/21   Jesusita Patterson APRN   predniSONE (DELTASONE) 20 MG tablet Take 1 tablet by mouth Daily. 4/22/22   Jesusita Patterson APRN   sertraline (Zoloft) 25 MG tablet Take 1 tablet by mouth Daily. 5/10/22   Jesusita Patterson APRN         Objective     Vital Signs: There were no vitals taken for this visit.  Physical Exam  Constitutional:       General: She is not in acute distress.     Appearance: Normal appearance. She is not ill-appearing, toxic-appearing or diaphoretic.   HENT:      Head: Normocephalic and atraumatic.   Neurological:      General: No focal deficit present.      Mental Status: She is alert and oriented to person, place, and time.      Comments: Patient speech clear and articulate.    Psychiatric:         Mood and Affect: Mood normal.         Behavior: Behavior normal.         Thought Content: Thought content normal.         Judgment: Judgment normal.         Results Reviewed:  Glucose   Date Value Ref Range Status   03/21/2022 107 (H) 70 - 100 mg/dL Final     BUN   Date Value Ref Range Status   03/21/2022 8 5 - 21 mg/dL Final     Creatinine   Date Value Ref Range Status   03/21/2022 0.52 0.50 - 1.40 mg/dL Final     Sodium   Date Value Ref Range Status   03/21/2022 136 135 - 145 mmol/L Final     Potassium   Date Value Ref Range Status   03/21/2022 3.9 3.5 - 5.3 mmol/L Final     Chloride   Date Value Ref Range Status   03/21/2022 101 98 - 110 mmol/L Final     CO2   Date Value Ref Range Status   03/21/2022 30.0 24.0 - 31.0 mmol/L Final     Calcium   Date Value Ref Range Status   03/21/2022 9.6 8.4 - 10.4 mg/dL Final     ALT (SGPT)   Date Value Ref Range Status   03/21/2022 15 0 - 35 U/L Final     AST (SGOT)   Date Value Ref Range Status   03/21/2022 16 7 - 45 U/L Final     WBC   Date Value Ref Range Status   03/21/2022 5.70 3.40 - 10.80 10*3/mm3 Final   07/22/2021 4.7 3.4  - 10.8 x10E3/uL Final   05/24/2018 5.3 4.0 - 10.5 10*3/uL Final     Hematocrit   Date Value Ref Range Status   03/21/2022 43.4 34.0 - 46.6 % Final   05/24/2018 40.0 37.0 - 47.0 % Final     Platelets   Date Value Ref Range Status   03/21/2022 371 140 - 450 10*3/mm3 Final   05/24/2018 295 150 - 450 10*3/uL Final     Triglycerides   Date Value Ref Range Status   12/02/2021 57 0 - 149 mg/dL Final     HDL Cholesterol   Date Value Ref Range Status   12/02/2021 55 >39 mg/dL Final     LDL Chol Calc ([facility])   Date Value Ref Range Status   12/02/2021 70 0 - 99 mg/dL Final         Assessment / Plan     Assessment/Plan:  1. Medication side effects  -discussed that if symptoms returned to stop medication and call PCP.  -advised that since symptoms had fully resolved and lasted only a short time, and patient did ok on medication in the past, was unlikely related.       Return for Next scheduled follow up. unless patient needs to be seen sooner or acute issues arise.      I have discussed the patient results/orders and and plan/recommendation with them at today's visit.      Giulia Kelsey, APRN   05/20/2022

## 2022-05-25 PROBLEM — R06.00 DYSPNEA: Chronic | Status: ACTIVE | Noted: 2021-07-22

## 2022-05-26 ENCOUNTER — HOSPITAL ENCOUNTER (OUTPATIENT)
Dept: MAMMOGRAPHY | Facility: HOSPITAL | Age: 37
Discharge: HOME | End: 2022-05-26
Admitting: NURSE PRACTITIONER

## 2022-05-26 DIAGNOSIS — Z12.31 ENCOUNTER FOR SCREENING MAMMOGRAM FOR BREAST CANCER: ICD-10-CM

## 2022-05-26 PROCEDURE — 77067 SCR MAMMO BI INCL CAD: CPT

## 2022-05-26 PROCEDURE — 77063 BREAST TOMOSYNTHESIS BI: CPT

## 2022-06-02 ENCOUNTER — TELEMEDICINE (OUTPATIENT)
Dept: INTERNAL MEDICINE | Facility: CLINIC | Age: 37
End: 2022-06-02

## 2022-06-02 DIAGNOSIS — F41.9 ANXIETY: Primary | ICD-10-CM

## 2022-06-02 PROCEDURE — 99213 OFFICE O/P EST LOW 20 MIN: CPT | Performed by: NURSE PRACTITIONER

## 2022-06-02 NOTE — PROGRESS NOTES
Subjective     Chief Complaint   Patient presents with   • Anxiety       History of Present Illness  Video visit was performed today. Pt was at work, provider was in clinic.  Patient comes in today with update on her Zoloft.  She was placed on 12 and half milligrams.  For the first 2 weeks, she felt very poorly however she has persevered and is now on week 3.  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  She does state that she is feeling better she thinks her mood is more stable.  She still has some morning nausea but is overall not as bad.  She still waking with panic in the middle of the night.  He has not had any other significant episodes of abdominal pain with nausea and vomiting.      Review of Systems   Otherwise complete ROS reviewed and negative except as mentioned in the HPI.    Past Medical History:   Past Medical History:   Diagnosis Date   • Abnormal ECG    • Allergic 2005   • Anxiety 2010   • Arrhythmia    • Asthma, extrinsic    • Gastroparesis 12/23/2021   • GERD (gastroesophageal reflux disease)    • Globus sensation 6/15/2021   • Headache 2002   • Intractable nausea and vomiting 6/15/2021   • Mild intermittent asthma without complication 6/15/2021   • Tachycardia      Past Surgical History:  Past Surgical History:   Procedure Laterality Date   • ADENOIDECTOMY     • APPENDECTOMY     • CARDIAC SURGERY      loop recorder   • CHOLECYSTECTOMY     • COLONOSCOPY  2015   • ENDOMETRIAL ABLATION     • ENDOSCOPY N/A 7/26/2021    Procedure: ESOPHAGOGASTRODUODENOSCOPY WITH ANESTHESIA;  Surgeon: Keegan Pope DO;  Location: [facility] ENDOSCOPY;  Service: Gastroenterology;  Laterality: N/A;  pre GERD  post  jesus BLOCK   • HYSTERECTOMY     • SUBTOTAL HYSTERECTOMY  2016   • TONSILLECTOMY       Social History:  reports that she has never smoked. She has never used smokeless tobacco. She reports previous alcohol use. She reports that she  does not use drugs.    Family History: family history includes COPD in her maternal grandmother; Cancer in her maternal grandfather; Heart disease in her maternal grandmother; No Known Problems in her father and mother; Stroke in her maternal grandfather. She was adopted.       Allergies:  Allergies   Allergen Reactions   • Compazine [Prochlorperazine] Palpitations   • Iodinated Diagnostic Agents Anaphylaxis   • Nortriptyline Hcl Rash   • Sulfa Antibiotics Angioedema, Other (See Comments) and Rash     breathing difficulty     • Proton Pump Inhibitors Swelling     Possible esophageal tightness  Possible esophageal tightness     Medications:  Prior to Admission medications    Medication Sig Start Date End Date Taking? Authorizing Provider   albuterol (PROVENTIL) (2.5 MG/3ML) 0.083% nebulizer solution INHALE 3 ML PO Q 4 - 6 H PRM    ProviderAlbaro MD   albuterol sulfate  (90 Base) MCG/ACT inhaler Inhale 2 puffs Every 4 (Four) Hours As Needed for Wheezing. 9/3/21   Jesusita Patterson APRN   ALPRAZolam (XANAX) 0.5 MG tablet Take 1 tablet by mouth 2 (Two) Times a Day As Needed for Anxiety. for anxiety 5/10/22   Jesusita Patterson APRN   Ascorbic Acid (DEANN-C PO) Take  by mouth.    ProviderAlbaro MD   Cholecalciferol (Vitamin D3) 50 MCG (2000 UT) capsule Take 2,000 Units by mouth Daily.    ProviderAlbaro MD   esomeprazole (nexIUM) 40 MG capsule Take 1 capsule by mouth 2 (Two) Times a Day.  Patient taking differently: Take 40 mg by mouth Daily. 1/3/22   Jesusita Patterson APRN   famotidine (PEPCID) 20 MG tablet Take 20 mg by mouth At Night As Needed for Heartburn.    ProviderAlbaro MD   fluticasone-salmeterol (ADVAIR HFA) 115-21 MCG/ACT inhaler Inhale 2 puffs 2 (Two) Times a Day for 30 days. 9/23/21 11/30/21  Princess Rico APRN   Multiple Vitamins-Minerals (ZINC PO) Take  by mouth.    ProviderAlbaro MD   mupirocin (BACTROBAN) 2 % ointment Apply 1 application  topically to the appropriate area as directed 3 (Three) Times a Day. 12/29/21   Leonarda Schafer DO   ondansetron ODT (ZOFRAN-ODT) 8 MG disintegrating tablet Place 1 tablet on the tongue Every 8 (Eight) Hours As Needed for Nausea or Vomiting. 12/2/21   Jesusita Patterson APRN   predniSONE (DELTASONE) 20 MG tablet Take 1 tablet by mouth Daily. 4/22/22   Jesusita Patterson APRN   sertraline (Zoloft) 25 MG tablet Take 1 tablet by mouth Daily. 5/10/22   Jesusita Patterson APRN       Objective     Vital Signs: There were no vitals taken for this visit.  Physical Exam  Vitals reviewed.   Constitutional:       Appearance: She is well-developed.   HENT:      Head: Normocephalic and atraumatic.   Eyes:      Extraocular Movements: EOM normal.      Pupils: Pupils are equal, round, and reactive to light.   Pulmonary:      Effort: Pulmonary effort is normal.   Skin:     General: Skin is warm.   Neurological:      Mental Status: She is alert.   Psychiatric:         Mood and Affect: Mood and affect normal.       Physical Exam   Constitutional: She appears well-developed.   HENT:   Head: Normocephalic and atraumatic.   Eyes: Pupils are equal, round, and reactive to light. EOM are normal.   Pulmonary/Chest: Effort normal.   Neurological: She is alert.   Skin: Skin is warm.   Psychiatric: She has a normal mood and affect.   Vitals reviewed.      Results Reviewed:  Glucose   Date Value Ref Range Status   03/21/2022 107 (H) 70 - 100 mg/dL Final     BUN   Date Value Ref Range Status   03/21/2022 8 5 - 21 mg/dL Final     Creatinine   Date Value Ref Range Status   03/21/2022 0.52 0.50 - 1.40 mg/dL Final     Sodium   Date Value Ref Range Status   03/21/2022 136 135 - 145 mmol/L Final     Potassium   Date Value Ref Range Status   03/21/2022 3.9 3.5 - 5.3 mmol/L Final     Chloride   Date Value Ref Range Status   03/21/2022 101 98 - 110 mmol/L Final     CO2   Date Value Ref Range Status   03/21/2022 30.0 24.0 - 31.0  mmol/L Final     Calcium   Date Value Ref Range Status   03/21/2022 9.6 8.4 - 10.4 mg/dL Final     ALT (SGPT)   Date Value Ref Range Status   03/21/2022 15 0 - 35 U/L Final     AST (SGOT)   Date Value Ref Range Status   03/21/2022 16 7 - 45 U/L Final     WBC   Date Value Ref Range Status   03/21/2022 5.70 3.40 - 10.80 10*3/mm3 Final   07/22/2021 4.7 3.4 - 10.8 x10E3/uL Final   05/24/2018 5.3 4.0 - 10.5 10*3/uL Final     Hematocrit   Date Value Ref Range Status   03/21/2022 43.4 34.0 - 46.6 % Final   05/24/2018 40.0 37.0 - 47.0 % Final     Platelets   Date Value Ref Range Status   03/21/2022 371 140 - 450 10*3/mm3 Final   05/24/2018 295 150 - 450 10*3/uL Final     Triglycerides   Date Value Ref Range Status   12/02/2021 57 0 - 149 mg/dL Final     HDL Cholesterol   Date Value Ref Range Status   12/02/2021 55 >39 mg/dL Final     LDL Chol Calc (NIH)   Date Value Ref Range Status   12/02/2021 70 0 - 99 mg/dL Final         Assessment / Plan     Assessment/Plan:  Diagnoses and all orders for this visit:    1. Anxiety (Primary)    Continue Zoloft 12.5 mg for now.  When she completes 4 weeks she will try to go to 25 mg.  She will keep me posted.  We will follow-up again in 6 weeks      I spent 18 minutes caring for Mago on this date of service. This time includes time spent by me in the following activities:preparing for the visit, counseling and educating the patient/family/caregiver and documenting information in the medical record    Return in about 6 weeks (around 7/14/2022). unless patient needs to be seen sooner or acute issues arise.      I have discussed the patient results/orders and and plan/recommendation with them at today's visit.      Jesusita Patterson, MCKAYLA   06/02/2022

## 2022-06-15 ENCOUNTER — TELEPHONE (OUTPATIENT)
Dept: PULMONOLOGY | Facility: CLINIC | Age: 37
End: 2022-06-15

## 2022-06-15 PROBLEM — J38.3 VOCAL CORD DYSFUNCTION: Chronic | Status: ACTIVE | Noted: 2020-05-04

## 2022-06-15 NOTE — TELEPHONE ENCOUNTER
Please call [facility] and see if patient came to the clinic and if so can they send the records. She should have seen him yesterday and I see her tomorrow.

## 2022-06-20 ENCOUNTER — LAB (OUTPATIENT)
Dept: LAB | Facility: HOSPITAL | Age: 37
End: 2022-06-20

## 2022-06-20 DIAGNOSIS — Z20.822 ENCOUNTER FOR PREOPERATIVE SCREENING LABORATORY TESTING FOR COVID-19 VIRUS: ICD-10-CM

## 2022-06-20 DIAGNOSIS — Z01.812 ENCOUNTER FOR PREOPERATIVE SCREENING LABORATORY TESTING FOR COVID-19 VIRUS: ICD-10-CM

## 2022-06-20 LAB — SARS-COV-2 ORF1AB RESP QL NAA+PROBE: NOT DETECTED

## 2022-06-20 PROCEDURE — C9803 HOPD COVID-19 SPEC COLLECT: HCPCS

## 2022-06-20 PROCEDURE — U0004 COV-19 TEST NON-CDC HGH THRU: HCPCS

## 2022-06-23 ENCOUNTER — OFFICE VISIT (OUTPATIENT)
Dept: PULMONOLOGY | Facility: CLINIC | Age: 37
End: 2022-06-23

## 2022-06-23 ENCOUNTER — PROCEDURE VISIT (OUTPATIENT)
Dept: PULMONOLOGY | Facility: CLINIC | Age: 37
End: 2022-06-23

## 2022-06-23 VITALS
WEIGHT: 112.8 LBS | SYSTOLIC BLOOD PRESSURE: 112 MMHG | HEART RATE: 104 BPM | OXYGEN SATURATION: 99 % | HEIGHT: 66 IN | BODY MASS INDEX: 18.13 KG/M2 | DIASTOLIC BLOOD PRESSURE: 64 MMHG

## 2022-06-23 DIAGNOSIS — R09.89 GLOBUS SENSATION: Chronic | ICD-10-CM

## 2022-06-23 DIAGNOSIS — R06.00 DYSPNEA, UNSPECIFIED TYPE: Primary | Chronic | ICD-10-CM

## 2022-06-23 DIAGNOSIS — K21.00 GASTROESOPHAGEAL REFLUX DISEASE WITH ESOPHAGITIS WITHOUT HEMORRHAGE: Chronic | ICD-10-CM

## 2022-06-23 DIAGNOSIS — J38.3 VOCAL CORD DYSFUNCTION: Chronic | ICD-10-CM

## 2022-06-23 DIAGNOSIS — J45.20 MILD INTERMITTENT ASTHMA WITHOUT COMPLICATION: Chronic | ICD-10-CM

## 2022-06-23 DIAGNOSIS — J45.20 MILD INTERMITTENT ASTHMA WITHOUT COMPLICATION: Primary | ICD-10-CM

## 2022-06-23 DIAGNOSIS — Z20.822 ENCOUNTER FOR PREOPERATIVE SCREENING LABORATORY TESTING FOR COVID-19 VIRUS: ICD-10-CM

## 2022-06-23 DIAGNOSIS — Z01.812 ENCOUNTER FOR PREOPERATIVE SCREENING LABORATORY TESTING FOR COVID-19 VIRUS: ICD-10-CM

## 2022-06-23 DIAGNOSIS — R00.0 TACHYCARDIA: Chronic | ICD-10-CM

## 2022-06-23 PROCEDURE — 94729 DIFFUSING CAPACITY: CPT | Performed by: NURSE PRACTITIONER

## 2022-06-23 PROCEDURE — 94010 BREATHING CAPACITY TEST: CPT | Performed by: NURSE PRACTITIONER

## 2022-06-23 PROCEDURE — 99214 OFFICE O/P EST MOD 30 MIN: CPT | Performed by: NURSE PRACTITIONER

## 2022-06-23 NOTE — PROCEDURES
Pulmonary Function Test  Performed by: Janice Montana, RRT  Authorized by: Princess Rico APRN      Pre Drug % Predicted    FVC: 99%   FEV1: 103%   FEF 25-75%: 96%   FEV1/FVC: 86%   DLCO: 130%   D/VAsb: 118%    Interpretation   Spirometry   Spirometry shows normal results.   Review of FVL curve   There is a flattening of the inspiratory curve, suggesting variable extrathoracic obstruction.   Diffusion Capacity  The patient's diffusion capacity is normal.  Diffusion capacity is normal when corrected for alveolar volume.

## 2022-08-04 ENCOUNTER — OFFICE VISIT (OUTPATIENT)
Dept: INTERNAL MEDICINE | Facility: CLINIC | Age: 37
End: 2022-08-04

## 2022-08-04 VITALS
HEIGHT: 66 IN | HEART RATE: 90 BPM | RESPIRATION RATE: 17 BRPM | DIASTOLIC BLOOD PRESSURE: 74 MMHG | TEMPERATURE: 97.5 F | OXYGEN SATURATION: 100 % | WEIGHT: 113 LBS | BODY MASS INDEX: 18.16 KG/M2 | SYSTOLIC BLOOD PRESSURE: 121 MMHG

## 2022-08-04 DIAGNOSIS — F41.9 ANXIETY: ICD-10-CM

## 2022-08-04 DIAGNOSIS — Z79.899 ENCOUNTER FOR LONG-TERM (CURRENT) USE OF OTHER MEDICATIONS: Primary | ICD-10-CM

## 2022-08-04 LAB
POC AMPHETAMINES: NEGATIVE
POC BARBITURATES: NEGATIVE
POC BENZODIAZEPHINES: NEGATIVE
POC COCAINE: NEGATIVE
POC METHADONE: NEGATIVE
POC METHAMPHETAMINE SCREEN URINE: NEGATIVE
POC OPIATES: NEGATIVE
POC OXYCODONE: NEGATIVE
POC PHENCYCLIDINE: NEGATIVE
POC PROPOXYPHENE: NEGATIVE
POC THC: NEGATIVE
POC TRICYCLIC ANTIDEPRESSANTS: NEGATIVE

## 2022-08-04 PROCEDURE — 99213 OFFICE O/P EST LOW 20 MIN: CPT | Performed by: NURSE PRACTITIONER

## 2022-08-04 PROCEDURE — 80305 DRUG TEST PRSMV DIR OPT OBS: CPT | Performed by: NURSE PRACTITIONER

## 2022-08-04 RX ORDER — SERTRALINE HYDROCHLORIDE 25 MG/1
25 TABLET, FILM COATED ORAL DAILY
Qty: 30 TABLET | Refills: 2 | Status: SHIPPED | OUTPATIENT
Start: 2022-08-04 | End: 2023-02-09 | Stop reason: SDUPTHER

## 2022-08-05 NOTE — PROGRESS NOTES
"        Subjective     Chief Complaint   Patient presents with   • Anxiety       History of Present Illness    The patient presents today due to follow up for medication refill and a urine drug screen to continue getting her prescribed medications. The patient is currently taking Zoloft and Xanax. She was originally taking 25 mg of Zoloft but it was \"too much\" for her and is now taking 12.5 mg and tolerating it well. She states she does not take Xanax often and currently does not need a refill. The patient is aware that she was directed to take Zoloft for 6 months but is questioning if she will have to continue taking it after that or if there is a possibility to taper down and eventually stop taking it.      The patient reports an approximate 10 pound weight gain; she reports she is eating 3 meals per day since her nausea improved. She denies abdominal pain, shortness of breath, or constipation.    In regards to her headaches, she still has them occasionally. She describes an episode in 06/2022  in which she felt her head was going to \"explode\" and also had some emesis.    She had an ENT consult this past Monday with Dr. Kathi Padilla at [facility]. She was referred after a pulmonology consult in which asthma was ruled out and was told she may have an issue related to her vocal cords. She was told her vocal cords looked good and that her breathing issue could be attributed to a neck tension problem and was recommended physical therapy for myofascial release.       Review of Systems   Otherwise complete ROS reviewed and negative except as mentioned in the HPI.    Past Medical History:   Past Medical History:   Diagnosis Date   • Abnormal ECG    • Allergic 2005   • Anxiety 2010   • Arrhythmia    • Asthma, extrinsic    • Gastroparesis 12/23/2021   • GERD (gastroesophageal reflux disease)    • Globus sensation 6/15/2021   • Headache 2002   • Intractable nausea and vomiting 6/15/2021   • Mild intermittent " asthma without complication 6/15/2021   • Tachycardia    • Vocal cord dysfunction 5/4/2020     Past Surgical History:  Past Surgical History:   Procedure Laterality Date   • ADENOIDECTOMY     • APPENDECTOMY     • CARDIAC SURGERY      loop recorder   • CHOLECYSTECTOMY     • COLONOSCOPY  2015   • ENDOMETRIAL ABLATION     • ENDOSCOPY N/A 7/26/2021    Procedure: ESOPHAGOGASTRODUODENOSCOPY WITH ANESTHESIA;  Surgeon: Keegan Pope DO;  Location: [facility] ENDOSCOPY;  Service: Gastroenterology;  Laterality: N/A;  pre GERD  post  jesusita BLOCK   • HYSTERECTOMY     • SUBTOTAL HYSTERECTOMY  2016   • TONSILLECTOMY       Social History:  reports that she has never smoked. She has never used smokeless tobacco. She reports previous alcohol use. She reports that she does not use drugs.    Family History: family history includes COPD in her maternal grandmother; Cancer in her maternal grandfather; Heart disease in her maternal grandmother; No Known Problems in her father and mother; Stroke in her maternal grandfather. She was adopted.      Allergies:  Allergies   Allergen Reactions   • Compazine [Prochlorperazine] Palpitations   • Iodinated Diagnostic Agents Anaphylaxis   • Nortriptyline Hcl Rash   • Sulfa Antibiotics Angioedema, Other (See Comments) and Rash     breathing difficulty     • Proton Pump Inhibitors Swelling     Possible esophageal tightness  Possible esophageal tightness     Medications:  Prior to Admission medications    Medication Sig Start Date End Date Taking? Authorizing Provider   albuterol (PROVENTIL) (2.5 MG/3ML) 0.083% nebulizer solution INHALE 3 ML PO Q 4 - 6 H PRM   Yes Provider, MD Albaro   albuterol sulfate  (90 Base) MCG/ACT inhaler Inhale 2 puffs Every 4 (Four) Hours As Needed for Wheezing. 9/3/21  Yes Jesusita Patterson APRN   ALPRAZolam (XANAX) 0.5 MG tablet Take 1 tablet by mouth 2 (Two) Times a Day As Needed for Anxiety. for anxiety 5/10/22  Yes Jesusita Pattersno  "MCKAYLA   Ascorbic Acid (DEANN-C PO) Take  by mouth.   Yes ProviderAlbaro MD   Cholecalciferol (Vitamin D3) 50 MCG (2000 UT) capsule Take 2,000 Units by mouth Daily.   Yes ProviderAlbaro MD   Multiple Vitamins-Minerals (ZINC PO) Take  by mouth.   Yes ProviderAlbaro MD   sertraline (Zoloft) 25 MG tablet Take 1 tablet by mouth Daily. 8/4/22  Yes Jesusita Patterson APRN   mupirocin (BACTROBAN) 2 % ointment Apply 1 application topically to the appropriate area as directed 3 (Three) Times a Day. 12/29/21   Leonarda Schafer,    ondansetron ODT (ZOFRAN-ODT) 8 MG disintegrating tablet Place 1 tablet on the tongue Every 8 (Eight) Hours As Needed for Nausea or Vomiting. 12/2/21   Jesusita Patterson APRN       PHQ-9 Depression Screening  Little interest or pleasure in doing things?     Feeling down, depressed, or hopeless?     Trouble falling or staying asleep, or sleeping too much?     Feeling tired or having little energy?     Poor appetite or overeating?     Feeling bad about yourself - or that you are a failure or have let yourself or your family down?     Trouble concentrating on things, such as reading the newspaper or watching television?     Moving or speaking so slowly that other people could have noticed? Or the opposite - being so fidgety or restless that you have been moving around a lot more than usual?     Thoughts that you would be better off dead, or of hurting yourself in some way?     PHQ-9 Total Score     If you checked off any problems, how difficult have these problems made it for you to do your work, take care of things at home, or get along with other people?                Objective     Vital Signs: /74   Pulse 90   Temp 97.5 °F (36.4 °C)   Resp 17   Ht 167.6 cm (66\")   Wt 51.3 kg (113 lb)   SpO2 100%   BMI 18.24 kg/m²   Physical Exam  Constitutional:       Appearance: She is well-developed.   HENT:      Head: Normocephalic and atraumatic.   Eyes:      " Conjunctiva/sclera: Conjunctivae normal.      Pupils: Pupils are equal, round, and reactive to light.   Neck:      Vascular: No JVD.   Cardiovascular:      Rate and Rhythm: Normal rate and regular rhythm.      Heart sounds: Normal heart sounds. No murmur heard.    No friction rub. No gallop.   Pulmonary:      Effort: Pulmonary effort is normal. No respiratory distress.      Breath sounds: Normal breath sounds. No wheezing or rales.   Chest:      Chest wall: No tenderness.   Abdominal:      General: Bowel sounds are normal. There is no distension.      Palpations: Abdomen is soft.      Tenderness: There is no abdominal tenderness. There is no guarding or rebound.   Musculoskeletal:         General: No tenderness or deformity. Normal range of motion.      Cervical back: Neck supple.   Skin:     General: Skin is warm and dry.      Findings: No rash.   Neurological:      Mental Status: She is alert and oriented to person, place, and time.      Cranial Nerves: No cranial nerve deficit.      Motor: No abnormal muscle tone.      Deep Tendon Reflexes: Reflexes normal.   Psychiatric:         Behavior: Behavior normal.         Thought Content: Thought content normal.         Judgment: Judgment normal.         BMI is below normal parameters (malnutrition). Recommendations: none (medical contraindication)      Results Reviewed:  Glucose   Date Value Ref Range Status   03/21/2022 107 (H) 70 - 100 mg/dL Final     BUN   Date Value Ref Range Status   03/21/2022 8 5 - 21 mg/dL Final     Creatinine   Date Value Ref Range Status   03/21/2022 0.52 0.50 - 1.40 mg/dL Final     Sodium   Date Value Ref Range Status   03/21/2022 136 135 - 145 mmol/L Final     Potassium   Date Value Ref Range Status   03/21/2022 3.9 3.5 - 5.3 mmol/L Final     Chloride   Date Value Ref Range Status   03/21/2022 101 98 - 110 mmol/L Final     CO2   Date Value Ref Range Status   03/21/2022 30.0 24.0 - 31.0 mmol/L Final     Calcium   Date Value Ref Range Status    03/21/2022 9.6 8.4 - 10.4 mg/dL Final     ALT (SGPT)   Date Value Ref Range Status   03/21/2022 15 0 - 35 U/L Final     AST (SGOT)   Date Value Ref Range Status   03/21/2022 16 7 - 45 U/L Final     WBC   Date Value Ref Range Status   03/21/2022 5.70 3.40 - 10.80 10*3/mm3 Final   07/22/2021 4.7 3.4 - 10.8 x10E3/uL Final   05/24/2018 5.3 4.0 - 10.5 10*3/uL Final     Hematocrit   Date Value Ref Range Status   03/21/2022 43.4 34.0 - 46.6 % Final   05/24/2018 40.0 37.0 - 47.0 % Final     Platelets   Date Value Ref Range Status   03/21/2022 371 140 - 450 10*3/mm3 Final   05/24/2018 295 150 - 450 10*3/uL Final     Triglycerides   Date Value Ref Range Status   12/02/2021 57 0 - 149 mg/dL Final     HDL Cholesterol   Date Value Ref Range Status   12/02/2021 55 >39 mg/dL Final     LDL Chol Calc (NIH)   Date Value Ref Range Status   12/02/2021 70 0 - 99 mg/dL Final         Assessment / Plan     Assessment/Plan:  Diagnoses and all orders for this visit:    1. Encounter for long-term (current) use of other medications (Primary)  -     POC Urine Drug Screen, Triage    2. Anxiety  -     sertraline (Zoloft) 25 MG tablet; Take 1 tablet by mouth Daily.  Dispense: 30 tablet; Refill: 2      The patient will provide urine sample for urine drug screen. We will refill Zoloft. The patient will follow-up in 6months or sooner if needed.       No follow-ups on file. unless patient needs to be seen sooner or acute issues arise.      I have discussed the patient results/orders and and plan/recommendation with them at today's visit.      Transcribed from ambient dictation for MCKAYLA Campos by Jolynn Pittman.  08/05/22   12:08 CDT    Patient verbalized consent to the visit recording.  I have personally performed the services described in this document as transcribed by the above individual, and it is both accurate and complete.  MCKAYLA Campos  8/5/2022  12:15 CDT

## 2022-09-15 DIAGNOSIS — F41.9 ANXIETY: ICD-10-CM

## 2022-09-15 RX ORDER — ALPRAZOLAM 0.5 MG/1
0.5 TABLET ORAL 2 TIMES DAILY PRN
Qty: 60 TABLET | Refills: 0 | Status: SHIPPED | OUTPATIENT
Start: 2022-09-15 | End: 2022-11-23 | Stop reason: SDUPTHER

## 2022-09-15 NOTE — TELEPHONE ENCOUNTER
Rx Refill Note  Requested Prescriptions     Pending Prescriptions Disp Refills   • ALPRAZolam (XANAX) 0.5 MG tablet 60 tablet 0     Sig: Take 1 tablet by mouth 2 (Two) Times a Day As Needed for Anxiety. for anxiety   Med last filled:  5/10/22  Last office visit with prescribing clinician: 8/4/2022      Next office visit with prescribing clinician: 2/9/2023     POC Urine Drug Screen, Triage (08/04/2022 16:48)               Rosemarie Borja RN  09/15/22, 14:54 CDT     3

## 2022-11-23 DIAGNOSIS — F41.9 ANXIETY: ICD-10-CM

## 2022-11-23 NOTE — TELEPHONE ENCOUNTER
Rx Refill Note  Requested Prescriptions     Pending Prescriptions Disp Refills   • ALPRAZolam (XANAX) 0.5 MG tablet 60 tablet 0     Sig: Take 1 tablet by mouth 2 (Two) Times a Day As Needed for Anxiety. for anxiety   Med last filled: 9/15/22   Last office visit with prescribing clinician: 8/4/2022      Next office visit with prescribing clinician: 2/9/2023     POC Urine Drug Screen, Triage (08/04/2022 16:48)               Rosemarie Borja RN  11/23/22, 10:43 CST

## 2022-11-24 RX ORDER — ALPRAZOLAM 0.5 MG/1
0.5 TABLET ORAL 2 TIMES DAILY PRN
Qty: 60 TABLET | Refills: 0 | Status: SHIPPED | OUTPATIENT
Start: 2022-11-24 | End: 2022-11-26

## 2022-11-26 RX ORDER — ALPRAZOLAM 0.5 MG/1
0.5 TABLET ORAL 2 TIMES DAILY PRN
Qty: 60 TABLET | Refills: 0 | Status: SHIPPED | OUTPATIENT
Start: 2022-11-26 | End: 2022-11-26

## 2022-11-26 RX ORDER — ALPRAZOLAM 0.5 MG/1
0.5 TABLET ORAL 2 TIMES DAILY PRN
Qty: 60 TABLET | Refills: 0 | Status: SHIPPED | OUTPATIENT
Start: 2022-11-26

## 2022-11-30 PROBLEM — G24.3 CERVICAL DYSTONIA: Chronic | Status: ACTIVE | Noted: 2022-11-30

## 2022-11-30 PROBLEM — G24.3 CERVICAL DYSTONIA: Status: ACTIVE | Noted: 2022-11-30

## 2023-02-09 ENCOUNTER — OFFICE VISIT (OUTPATIENT)
Dept: INTERNAL MEDICINE | Facility: CLINIC | Age: 38
End: 2023-02-09
Payer: COMMERCIAL

## 2023-02-09 VITALS
TEMPERATURE: 98.3 F | DIASTOLIC BLOOD PRESSURE: 73 MMHG | WEIGHT: 141 LBS | BODY MASS INDEX: 22.66 KG/M2 | HEART RATE: 86 BPM | SYSTOLIC BLOOD PRESSURE: 119 MMHG | RESPIRATION RATE: 18 BRPM | HEIGHT: 66 IN | OXYGEN SATURATION: 99 %

## 2023-02-09 DIAGNOSIS — Z79.899 ENCOUNTER FOR LONG-TERM (CURRENT) USE OF OTHER MEDICATIONS: ICD-10-CM

## 2023-02-09 DIAGNOSIS — F41.9 ANXIETY: Primary | ICD-10-CM

## 2023-02-09 PROBLEM — R11.10 NON-INTRACTABLE VOMITING: Status: ACTIVE | Noted: 2022-02-24

## 2023-02-09 PROBLEM — R63.4 WEIGHT LOSS: Status: ACTIVE | Noted: 2022-02-24

## 2023-02-09 LAB
AMPHET+METHAMPHET UR QL: NEGATIVE
AMPHETAMINE INTERNAL CONTROL: NORMAL
AMPHETAMINES UR QL: NEGATIVE
BARBITURATE INTERNAL CONTROL: NORMAL
BARBITURATES UR QL SCN: NEGATIVE
BENZODIAZ UR QL SCN: NEGATIVE
BENZODIAZEPINE INTERNAL CONTROL: NORMAL
BUPRENORPHINE INTERNAL CONTROL: NORMAL
BUPRENORPHINE SERPL-MCNC: NEGATIVE NG/ML
CANNABINOIDS SERPL QL: NEGATIVE
COCAINE INTERNAL CONTROL: NORMAL
COCAINE UR QL: NEGATIVE
EDDP INTERNAL CONTROL: NORMAL
EDDP UR QL SCN: NEGATIVE
MDMA (ECSTASY) INTERNAL CONTROL: NORMAL
MDMA UR QL SCN: NEGATIVE
METHADONE INTERNAL CONTROL: NORMAL
METHADONE UR QL SCN: NEGATIVE
METHAMPHETAMINE INTERNAL CONTROL: NORMAL
MORPHINE INTERNAL CONTROL: NORMAL
MORPHINE UR QL SCN: NEGATIVE
OXYCODONE INTERNAL CONTROL: NORMAL
OXYCODONE UR QL SCN: NEGATIVE
PCP UR QL SCN: NEGATIVE
PHENCYCLIDINE INTERNAL CONTROL: NORMAL
PROPOXYPH UR QL SCN: NEGATIVE
PROPOXYPHENE INTERNAL CONTROL: NORMAL
THC INTERNAL CONTROL: NORMAL
TRICYCLIC ANTIDEPRESSANTS INTERNAL CONTROL: NORMAL
TRICYCLICS UR QL SCN: NEGATIVE

## 2023-02-09 PROCEDURE — 80305 DRUG TEST PRSMV DIR OPT OBS: CPT | Performed by: NURSE PRACTITIONER

## 2023-02-09 PROCEDURE — 99214 OFFICE O/P EST MOD 30 MIN: CPT | Performed by: NURSE PRACTITIONER

## 2023-02-09 RX ORDER — SERTRALINE HYDROCHLORIDE 25 MG/1
12.5 TABLET, FILM COATED ORAL DAILY
Qty: 45 TABLET | Refills: 2 | Status: SHIPPED | OUTPATIENT
Start: 2023-02-09

## 2023-02-09 RX ORDER — CYCLOBENZAPRINE HCL 5 MG
TABLET ORAL
COMMUNITY

## 2023-02-09 RX ORDER — LORATADINE 10 MG/1
TABLET ORAL DAILY
COMMUNITY

## 2023-02-09 NOTE — PROGRESS NOTES
Subjective     Chief Complaint   Patient presents with   • Anxiety       History of Present Illness   The patient presents today for a 6-month followup.    Anxiety  The patient has gained 30 pounds since her last visit. She states things are going well with only occasional episodes of anxiety. Her last episode was a few weeks ago during a series of meetings. She still occasional nausea on morning awakening; however, this has significantly improved. Her appetite has also significantly improved after struggling to gain weight for 1.5 years. She is taking Zoloft 12.5 mg and Xanax as needed. She is tolerating the medication well.     Patient's PMR from outside medical facility reviewed and noted.    Review of Systems   Otherwise complete ROS reviewed and negative except as mentioned in the HPI.    Past Medical History:   Past Medical History:   Diagnosis Date   • Abnormal ECG    • Allergic 2005   • Anxiety 2010   • Arrhythmia    • Asthma, extrinsic    • Cervical dystonia 11/30/2022   • Gastroparesis 12/23/2021   • GERD (gastroesophageal reflux disease)    • Globus sensation 6/15/2021   • Headache 2002   • Intractable nausea and vomiting 6/15/2021   • Mild intermittent asthma without complication 6/15/2021   • Tachycardia    • Vocal cord dysfunction 5/4/2020     Past Surgical History:  Past Surgical History:   Procedure Laterality Date   • ADENOIDECTOMY     • APPENDECTOMY     • CARDIAC SURGERY      loop recorder   • CHOLECYSTECTOMY     • COLONOSCOPY  2015   • ENDOMETRIAL ABLATION     • ENDOSCOPY N/A 7/26/2021    Procedure: ESOPHAGOGASTRODUODENOSCOPY WITH ANESTHESIA;  Surgeon: Keegan Pope DO;  Location: [facility] ENDOSCOPY;  Service: Gastroenterology;  Laterality: N/A;  pre GERD  post  jesus BLOCK   • HYSTERECTOMY     • SUBTOTAL HYSTERECTOMY  2016   • TONSILLECTOMY       Social History:  reports that she has never smoked. She has never used smokeless tobacco. She reports that she does not currently  use alcohol. She reports that she does not use drugs.    Family History: family history includes COPD in her maternal grandmother; Cancer in her maternal grandfather; Heart disease in her maternal grandmother; No Known Problems in her father and mother; Stroke in her maternal grandfather. She was adopted.      Allergies:  Allergies   Allergen Reactions   • Compazine [Prochlorperazine] Palpitations   • Iodinated Contrast Media Anaphylaxis   • Nortriptyline Hcl Rash   • Sulfa Antibiotics Angioedema, Other (See Comments) and Rash     breathing difficulty    breathing difficulty    Other reaction(s): Angioedema, Other (See Comments)  breathing difficulty   • Proton Pump Inhibitors Swelling     Possible esophageal tightness  Possible esophageal tightness     Medications:  Prior to Admission medications    Medication Sig Start Date End Date Taking? Authorizing Provider   sertraline (Zoloft) 25 MG tablet Take 0.5 tablets by mouth Daily. 2/9/23  Yes Jesusita Patterson APRN   albuterol (PROVENTIL) (2.5 MG/3ML) 0.083% nebulizer solution INHALE 3 ML PO Q 4 - 6 H PRM    Albaro Kenyon MD   albuterol sulfate  (90 Base) MCG/ACT inhaler Inhale 2 puffs Every 4 (Four) Hours As Needed for Wheezing. 9/3/21   Jesusita Patterson APRN   ALPRAZolam (XANAX) 0.5 MG tablet Take 1 tablet by mouth 2 (Two) Times a Day As Needed for Anxiety. for anxiety 11/26/22   Jesusita Patterson APRN   Ascorbic Acid (DEANN-C PO) Take  by mouth.    Albaro Kenyon MD   Cholecalciferol (Vitamin D3) 50 MCG (2000 UT) capsule Take 2,000 Units by mouth Daily.    Albaro Kenyon MD   cyclobenzaprine (FLEXERIL) 5 MG tablet cyclobenzaprine 5 mg tablet   Take 1 tablet as needed by oral route at bedtime for 30 days.    Albaro Kenyon MD   loratadine (CLARITIN) 10 MG tablet Daily.    Albaro Kenyon MD   Multiple Vitamins-Minerals (ZINC PO) Take  by mouth.    Albaro Kenyon MD   mupirocin (BACTROBAN) 2 %  "ointment Apply 1 application topically to the appropriate area as directed 3 (Three) Times a Day. 12/29/21   Leonarda Schafer   ondansetron ODT (ZOFRAN-ODT) 8 MG disintegrating tablet Place 1 tablet on the tongue Every 8 (Eight) Hours As Needed for Nausea or Vomiting. 12/2/21   Jesusita Patterson APRN   sertraline (Zoloft) 25 MG tablet Take 1 tablet by mouth Daily. 8/4/22 2/9/23  Jesusita Patterson APRN       PHQ-9 Depression Screening  Little interest or pleasure in doing things? 0-->not at all   Feeling down, depressed, or hopeless? 0-->not at all   Trouble falling or staying asleep, or sleeping too much?     Feeling tired or having little energy?     Poor appetite or overeating?     Feeling bad about yourself - or that you are a failure or have let yourself or your family down?     Trouble concentrating on things, such as reading the newspaper or watching television?     Moving or speaking so slowly that other people could have noticed? Or the opposite - being so fidgety or restless that you have been moving around a lot more than usual?     Thoughts that you would be better off dead, or of hurting yourself in some way?     PHQ-9 Total Score 0   If you checked off any problems, how difficult have these problems made it for you to do your work, take care of things at home, or get along with other people?                Objective     Vital Signs: /73   Pulse 86   Temp 98.3 °F (36.8 °C)   Resp 18   Ht 167.6 cm (66\")   Wt 64 kg (141 lb)   SpO2 99%   BMI 22.76 kg/m²   Physical Exam  Vitals reviewed.   Constitutional:       Appearance: She is well-developed.   HENT:      Head: Normocephalic and atraumatic.   Eyes:      Pupils: Pupils are equal, round, and reactive to light.   Neck:      Vascular: No JVD.   Cardiovascular:      Rate and Rhythm: Normal rate and regular rhythm.   Pulmonary:      Effort: Pulmonary effort is normal.   Abdominal:      General: Bowel sounds are normal.      " Palpations: Abdomen is soft.   Musculoskeletal:         General: No deformity.      Cervical back: Normal range of motion and neck supple.   Lymphadenopathy:      Cervical: No cervical adenopathy.   Skin:     General: Skin is warm and dry.   Neurological:      Mental Status: She is alert and oriented to person, place, and time.   Psychiatric:         Behavior: Behavior normal.         Thought Content: Thought content normal.         Judgment: Judgment normal.         BMI is within normal parameters. No other follow-up for BMI required.      Results Reviewed:  Glucose   Date Value Ref Range Status   03/21/2022 107 (H) 70 - 100 mg/dL Final     BUN   Date Value Ref Range Status   03/21/2022 8 5 - 21 mg/dL Final     Creatinine   Date Value Ref Range Status   03/21/2022 0.52 0.50 - 1.40 mg/dL Final     Sodium   Date Value Ref Range Status   03/21/2022 136 135 - 145 mmol/L Final     Potassium   Date Value Ref Range Status   03/21/2022 3.9 3.5 - 5.3 mmol/L Final     Chloride   Date Value Ref Range Status   03/21/2022 101 98 - 110 mmol/L Final     CO2   Date Value Ref Range Status   03/21/2022 30.0 24.0 - 31.0 mmol/L Final     Calcium   Date Value Ref Range Status   03/21/2022 9.6 8.4 - 10.4 mg/dL Final     ALT (SGPT)   Date Value Ref Range Status   03/21/2022 15 0 - 35 U/L Final     AST (SGOT)   Date Value Ref Range Status   03/21/2022 16 7 - 45 U/L Final     WBC   Date Value Ref Range Status   03/21/2022 5.70 3.40 - 10.80 10*3/mm3 Final   07/22/2021 4.7 3.4 - 10.8 x10E3/uL Final   05/24/2018 5.3 4.0 - 10.5 10*3/uL Final     Hematocrit   Date Value Ref Range Status   03/21/2022 43.4 34.0 - 46.6 % Final   05/24/2018 40.0 37.0 - 47.0 % Final     Platelets   Date Value Ref Range Status   03/21/2022 371 140 - 450 10*3/mm3 Final   05/24/2018 295 150 - 450 10*3/uL Final     Triglycerides   Date Value Ref Range Status   12/02/2021 57 0 - 149 mg/dL Final     HDL Cholesterol   Date Value Ref Range Status   12/02/2021 55 >39 mg/dL  Final     LDL Chol Calc ([facility])   Date Value Ref Range Status   12/02/2021 70 0 - 99 mg/dL Final         Assessment / Plan     Assessment/Plan:  1. Anxiety       -     Continue Zoloft 25 mg, 0.5 tablet daily.        -     Continue Xanax as needed.        -     sertraline (Zoloft) 25 MG tablet; Take 0.5 tablets by mouth Daily.  Dispense: 45 tablet; Refill: 2    2. Encounter for long-term (current) use of other medications       -     POC Urine Drug Screen ([facility] ONLY)        Return for Annual physical. unless patient needs to be seen sooner or acute issues arise.      I have discussed the patient results/orders and and plan/recommendation with them at today's visit.      Transcribed from ambient dictation for MCKAYLA Campos by Haydee Villanueva.  02/09/23   15:10 CST    Patient or patient representative verbalized consent to the visit recording.  I have personally performed the services described in this document as transcribed by the above individual, and it is both accurate and complete.

## 2023-02-13 ENCOUNTER — TELEPHONE (OUTPATIENT)
Dept: CARDIOLOGY | Facility: CLINIC | Age: 38
End: 2023-02-13
Payer: COMMERCIAL

## 2023-02-20 ENCOUNTER — TELEPHONE (OUTPATIENT)
Dept: CARDIOLOGY | Facility: CLINIC | Age: 38
End: 2023-02-20
Payer: COMMERCIAL

## 2023-02-20 DIAGNOSIS — Z45.09 ENCOUNTER FOR LOOP RECORDER AT END OF BATTERY LIFE: Primary | ICD-10-CM

## 2023-02-20 NOTE — TELEPHONE ENCOUNTER
Patient left a message that she would like to schedule surgery to have her loop recorder removed. Mohini Her MA

## 2023-03-03 ENCOUNTER — HOSPITAL ENCOUNTER (OUTPATIENT)
Dept: CARDIOLOGY | Facility: HOSPITAL | Age: 38
Discharge: HOME | End: 2023-03-03
Admitting: INTERNAL MEDICINE
Payer: COMMERCIAL

## 2023-03-03 VITALS
SYSTOLIC BLOOD PRESSURE: 118 MMHG | WEIGHT: 140 LBS | BODY MASS INDEX: 22.5 KG/M2 | RESPIRATION RATE: 20 BRPM | OXYGEN SATURATION: 100 % | HEART RATE: 96 BPM | HEIGHT: 66 IN | DIASTOLIC BLOOD PRESSURE: 62 MMHG | TEMPERATURE: 97.2 F

## 2023-03-03 DIAGNOSIS — Z45.09 ENCOUNTER FOR LOOP RECORDER AT END OF BATTERY LIFE: ICD-10-CM

## 2023-03-03 LAB
MAXIMAL PREDICTED HEART RATE: 181 BPM
STRESS TARGET HR: 154 BPM

## 2023-03-03 PROCEDURE — 33286 RMVL SUBQ CAR RHYTHM MNTR: CPT

## 2023-03-03 PROCEDURE — 33286 RMVL SUBQ CAR RHYTHM MNTR: CPT | Performed by: INTERNAL MEDICINE

## 2023-03-03 RX ORDER — LIDOCAINE HYDROCHLORIDE AND EPINEPHRINE BITARTRATE 20; .01 MG/ML; MG/ML
INJECTION, SOLUTION SUBCUTANEOUS
Status: COMPLETED | OUTPATIENT
Start: 2023-03-03 | End: 2023-03-03

## 2023-03-03 RX ORDER — LIDOCAINE HYDROCHLORIDE AND EPINEPHRINE BITARTRATE 20; .01 MG/ML; MG/ML
INJECTION, SOLUTION SUBCUTANEOUS
Status: DISPENSED
Start: 2023-03-03 | End: 2023-03-03

## 2023-03-03 RX ADMIN — LIDOCAINE HYDROCHLORIDE,EPINEPHRINE BITARTRATE 10 ML: 20; .01 INJECTION, SOLUTION INFILTRATION; PERINEURAL at 08:43

## 2023-03-03 RX ADMIN — LIDOCAINE HYDROCHLORIDE,EPINEPHRINE BITARTRATE: 20; .01 INJECTION, SOLUTION INFILTRATION; PERINEURAL at 08:50

## 2023-03-03 NOTE — PROGRESS NOTES
Chief Complaint  Asthma    Subjective    History of Present Illness {CC  Problem List  Visit Diagnosis   Encounters  Notes  Medications  Labs  Result Review Imaging  Media     Mago Gamboa presents to [facility] for:    Management of asthma.  She is a never smoker.  She has dogs in the home.  Last office visit she reported having PFTs approximately 2 to 3 years ago at [place]. Records obtained. Last PFT March 2020. These were normal.     She has shortness of breath as well as pain in the throat and nausea and vomiting. She has seen multiple providers throughout multiple specialties and multiple areas and states over the last 5 years.  She has tried Spiriva, Combivent, Advair and proair.  None of these were helpful.  I recommended she start an albuterol inhaler.  If it was not helping or she was using it excessively we would try maintenance inhaler.  She has been using it a couple of times a week over the last month.  She cannot tell that that has been overly helpful.  Presented to the emergency room last week after using it for 3 days with worsening symptoms.  Work-up was essentially negative.  She was sent home on prednisone.  This helped significantly.      I ordered overnight oximetry for her nocturnal complaints.  She is not hypoxic.  No overwhelming apneas noted.  She had some elevated heart rate issues.  This is been ongoing problem for her as well.  She has a loop recorder in.  It has identified PACs and SVTs.  She is needing to establish with a cardiologist locally.      She has had multiple EGDs, H. pylori testing, pH testing and a swallow study.  She reports all of these to be negative.  Records obtained and reviewed actually identified an endoscopy pathology to report acute esophagitis as well as signs of chronic gastritis.  He was recommended she take a PPI twice a day indefinitely.  At her most recent appointment she was not taking this due  to cost and lack of improvement.  It appears she has undergone an EGD since I saw her last..  They recommended she resume the PPI twice per day.  Biopsies were all normal.  Since the EGD she has been taking Tums 2-3 times per week and Zofran to 3 times per day with control of her symptoms.     Patient was evaluated by ENT and noted to have some muscle dystonia and laryngeal spasms.  They recommended she treat her reflux and also start fluticasone and Mucinex.  They also referred her for speech and physical therapy for her throat and swallowing complaints.  ENT recommended allergy testing if her complaints did not improve.  This was not completed.  She would like to pursue that.  She would also like to be referred to an allergy specialist.    She continues to have ongoing allergy issues despite taking Zyrtec and Flonase.  She is asking if there is anything else to take while awaiting allergy referral.  Ultimately she thinks her primary issue is asthma.          Prior to Admission medications    Medication Sig Start Date End Date Taking? Authorizing Provider   albuterol (PROVENTIL) (2.5 MG/3ML) 0.083% nebulizer solution INHALE 3 ML PO Q 4 - 6 H PRM    ProviderAlbaro MD   albuterol sulfate  (90 Base) MCG/ACT inhaler Inhale 2 puffs Every 4 (Four) Hours As Needed for Wheezing. 9/3/21   Jesusita Patterson APRN   ALPRAZolam (XANAX) 0.5 MG tablet Take 0.05 mg by mouth 2 (Two) Times a Day As Needed.    ProviderAlbaro MD   ALPRAZolam (Xanax) 0.5 MG tablet Take 1 tablet by mouth 2 (Two) Times a Day As Needed for Anxiety. 9/3/21   Jesusita Patterson APRN   cholestyramine (QUESTRAN) 4 g packet Take 1 packet by mouth 3 (Three) Times a Day.    ProviderAlbaro MD   dicyclomine (Bentyl) 10 MG capsule Take 1 capsule by mouth 4 (Four) Times a Day Before Meals & at Bedtime. 8/13/21   Jesusita Patterson APRN   multivitamin with minerals tablet tablet Take 1 tablet by mouth Daily.    Provider  "MD Albaro   ondansetron (ZOFRAN) 8 MG tablet Take 1 tablet by mouth Every 8 (Eight) Hours As Needed for Nausea or Vomiting. 9/3/21   Jesusita Patterson APRN   ondansetron ODT (ZOFRAN-ODT) 8 MG disintegrating tablet Place 1 tablet every 8 hours by translingual route as needed. 5/20/21   Provider, MD Albaro       Social History     Socioeconomic History   • Marital status:      Spouse name: Not on file   • Number of children: Not on file   • Years of education: Not on file   • Highest education level: Not on file   Tobacco Use   • Smoking status: Never Smoker   • Smokeless tobacco: Never Used   Vaping Use   • Vaping Use: Never used   Substance and Sexual Activity   • Alcohol use: Yes     Alcohol/week: 0.0 standard drinks     Comment: occasionally   • Drug use: Never   • Sexual activity: Yes     Partners: Male     Birth control/protection: Surgical       Objective   Vital Signs:   /60   Pulse 86   Ht 170.2 cm (67\")   Wt 53.5 kg (118 lb)   SpO2 99% Comment: RA  BMI 18.48 kg/m²     Physical Exam  Constitutional:       Interventions: Face mask in place.   Cardiovascular:      Rate and Rhythm: Normal rate and regular rhythm.      Heart sounds: No murmur heard.     Pulmonary:      Effort: Pulmonary effort is normal.      Breath sounds: Normal breath sounds.   Musculoskeletal:      Right lower leg: No edema.      Left lower leg: No edema.   Neurological:      Mental Status: She is alert and oriented to person, place, and time.        Result Review :{ Labs  Result Review  Imaging  Med Tab  Media :      My interpretation of the PFT: none for this visit    No results found for this or any previous visit.      My interpretation of imaging:  None for this visit    My interpretation of labs: none for this visit      Assessment and Plan {CC Problem List  Visit Diagnosis  ROS  Review (Popup)  Health Maintenance  Quality  BestPractice  Medications  SmartSets  SnapShot Encounters  " Media      Diagnoses and all orders for this visit:    1. Mild intermittent asthma without complication (Primary)  Comments:  Currently uncontrolled.  She did not benefit from Singulair.  Taking albuterol frequently.  Recent prednisone.  Prescription sent in for Advair  Orders:  -     fluticasone-salmeterol (ADVAIR HFA) 115-21 MCG/ACT inhaler; Inhale 2 puffs 2 (Two) Times a Day for 30 days.  Dispense: 1 each; Refill: 11    2. Globus sensation  Comments:  Chronic she is seen multiple specialist.  Defer    3. Gastroesophageal reflux disease with esophagitis without hemorrhage  Comments:  Recent EGD negative.  She is taking Tums and Zofran.  Defer    4. Seasonal allergic rhinitis due to pollen  Comments:  Continue Flonase and Zyrtec.  Add azelastine.  Allergy panel.  Referral to Dr. Emmanuel  Orders:  -     azelastine (ASTELIN) 0.1 % nasal spray; 2 sprays into the nostril(s) as directed by provider 2 (Two) Times a Day for 30 days. Use in each nostril as directed  Dispense: 1 each; Refill: 5  -     Ambulatory Referral to Allergy  -     Allergens With / Total IgE Area 5        Patient's Body mass index is 18.48 kg/m². indicating that she is within normal range (BMI 18.5-24.9). No BMI management plan needed..        Princess Rico, MCKAYLA  9/23/2021  16:16 CDT    Follow Up {Instructions Charge Capture  Follow-up Communications   Return in about 3 months (around 12/23/2021).    Patient was given instructions and counseling regarding her condition or for health maintenance advice. Please see specific information pulled into the AVS if appropriate.      Yes

## 2023-03-23 ENCOUNTER — TELEPHONE (OUTPATIENT)
Dept: CARDIOLOGY | Facility: CLINIC | Age: 38
End: 2023-03-23
Payer: COMMERCIAL

## 2023-03-23 NOTE — TELEPHONE ENCOUNTER
Patient had loop recorder removed on 3/3 and asks if she needs to come in to have stitch clipped or should she do it herself. Mohini Her MA

## 2023-03-24 ENCOUNTER — HOSPITAL ENCOUNTER (OUTPATIENT)
Dept: CARDIOLOGY | Facility: HOSPITAL | Age: 38
Discharge: HOME | End: 2023-03-24
Payer: COMMERCIAL

## 2023-03-24 NOTE — NURSING NOTE
PATIENT TO [facility] FOR SITE CHECK D/P RECENT LOOP REMOVAL. SITE WAS CLEAN, DRY, INTACT AND PINK; DID APPEAR TO HAVE SMALL FOREIGN BODY PROTRUDING FROM INCS (WHAT APPEARS TO BE A STITCH). DR EDMONDSON TO BEDSIDE. HE ADDRESSED ISSUE AND DISCHARGED PATIENT. PATIENT AMBULATORY ON OWN.

## 2023-05-09 ENCOUNTER — OFFICE VISIT (OUTPATIENT)
Dept: INTERNAL MEDICINE | Facility: CLINIC | Age: 38
End: 2023-05-09
Payer: COMMERCIAL

## 2023-05-09 VITALS
HEIGHT: 66 IN | DIASTOLIC BLOOD PRESSURE: 75 MMHG | RESPIRATION RATE: 18 BRPM | OXYGEN SATURATION: 100 % | HEART RATE: 78 BPM | WEIGHT: 141 LBS | SYSTOLIC BLOOD PRESSURE: 126 MMHG | TEMPERATURE: 97.9 F | BODY MASS INDEX: 22.66 KG/M2

## 2023-05-09 DIAGNOSIS — F41.9 ANXIETY: ICD-10-CM

## 2023-05-09 DIAGNOSIS — Z00.00 ROUTINE GENERAL MEDICAL EXAMINATION AT A HEALTH CARE FACILITY: Primary | ICD-10-CM

## 2023-05-09 PROCEDURE — 99395 PREV VISIT EST AGE 18-39: CPT | Performed by: NURSE PRACTITIONER

## 2023-05-09 RX ORDER — SERTRALINE HYDROCHLORIDE 25 MG/1
12.5 TABLET, FILM COATED ORAL DAILY
Qty: 45 TABLET | Refills: 2 | Status: SHIPPED | OUTPATIENT
Start: 2023-05-09

## 2023-05-09 RX ORDER — ALPRAZOLAM 0.5 MG/1
0.5 TABLET ORAL 2 TIMES DAILY PRN
Qty: 60 TABLET | Refills: 0 | Status: SHIPPED | OUTPATIENT
Start: 2023-05-09

## 2023-05-09 NOTE — PROGRESS NOTES
Subjective     Chief Complaint   Patient presents with   • Annual Exam       History of Present Illness  Patient comes in today for her annual exam overall, she is doing well.  Still suffers with some anxiety and is using Xanax and Zoloft.  She does still complain of some neck pain and some bilateral knee pain for which she is tolerating at this time.  Otherwise she is actually gained weight her blood pressure has been stable.  She is not complaining of any chest pain or shortness of breath no bowel or bladder dysfunction and she is up-to-date on all of her screenings with the exception of her Pap smear.  She does states she had a Pap smear about 3 to 4 years ago and does not want to have another one but states that she would consider it.      Review of Systems   Otherwise complete ROS reviewed and negative except as mentioned in the HPI.    Past Medical History:   Past Medical History:   Diagnosis Date   • Abnormal ECG    • Allergic 2005   • Anxiety 2010   • Arrhythmia    • Asthma, extrinsic    • Cervical dystonia 11/30/2022   • Gastroparesis 12/23/2021   • GERD (gastroesophageal reflux disease)    • Globus sensation 6/15/2021   • Headache 2002   • Intractable nausea and vomiting 6/15/2021   • Mild intermittent asthma without complication 6/15/2021   • Tachycardia    • Vocal cord dysfunction 5/4/2020     Past Surgical History:  Past Surgical History:   Procedure Laterality Date   • ADENOIDECTOMY     • APPENDECTOMY     • CARDIAC SURGERY      loop recorder   • CHOLECYSTECTOMY     • COLONOSCOPY  2015   • ENDOMETRIAL ABLATION     • ENDOSCOPY N/A 7/26/2021    Procedure: ESOPHAGOGASTRODUODENOSCOPY WITH ANESTHESIA;  Surgeon: Keegan Pope DO;  Location: [facility] ENDOSCOPY;  Service: Gastroenterology;  Laterality: N/A;  pre GERD  post  jesus BLOCK   • HYSTERECTOMY     • SUBTOTAL HYSTERECTOMY  2016   • TONSILLECTOMY       Social History:  reports that she has never smoked. She has never used smokeless  tobacco. She reports that she does not currently use alcohol. She reports that she does not use drugs.    Family History: family history includes COPD in her maternal grandmother; Cancer in her maternal grandfather; Heart disease in her maternal grandmother; No Known Problems in her father and mother; Stroke in her maternal grandfather. She was adopted.      Allergies:  Allergies   Allergen Reactions   • Compazine [Prochlorperazine] Palpitations   • Iodinated Contrast Media Anaphylaxis   • Nortriptyline Hcl Rash   • Sulfa Antibiotics Angioedema, Other (See Comments) and Rash     breathing difficulty    breathing difficulty    Other reaction(s): Angioedema, Other (See Comments)  breathing difficulty     Medications:  Prior to Admission medications    Medication Sig Start Date End Date Taking? Authorizing Provider   albuterol (PROVENTIL) (2.5 MG/3ML) 0.083% nebulizer solution INHALE 3 ML PO Q 4 - 6 H PRM    Albaro Kenyon MD   albuterol sulfate  (90 Base) MCG/ACT inhaler Inhale 2 puffs Every 4 (Four) Hours As Needed for Wheezing. 9/3/21   Jesusita Patterson APRN   ALPRAZolam (XANAX) 0.5 MG tablet Take 1 tablet by mouth 2 (Two) Times a Day As Needed for Anxiety. for anxiety 11/26/22   Jesusita Patterson APRN   Ascorbic Acid (DEANN-C PO) Take  by mouth.    Albaro Kenyon MD   Cholecalciferol (Vitamin D3) 50 MCG (2000 UT) capsule Take 1 capsule by mouth Daily.    Albaro Kenyon MD   cyclobenzaprine (FLEXERIL) 5 MG tablet cyclobenzaprine 5 mg tablet   Take 1 tablet as needed by oral route at bedtime for 30 days.    Albaro Kenyon MD   loratadine (CLARITIN) 10 MG tablet Daily.    Albaro Kenyon MD   Multiple Vitamins-Minerals (ZINC PO) Take  by mouth.    Albaro Kenyon MD   mupirocin (BACTROBAN) 2 % ointment Apply 1 application topically to the appropriate area as directed 3 (Three) Times a Day. 12/29/21   Leonarda Schafer   ondansetron ODT (ZOFRAN-ODT) 8 MG  "disintegrating tablet Place 1 tablet on the tongue Every 8 (Eight) Hours As Needed for Nausea or Vomiting. 12/2/21   Jesusita Patterson APRN   sertraline (Zoloft) 25 MG tablet Take 0.5 tablets by mouth Daily. 2/9/23   Jesusita Patterson APRN       Objective     Vital Signs: /75   Pulse 78   Temp 97.9 °F (36.6 °C)   Resp 18   Ht 167.6 cm (66\")   Wt 64 kg (141 lb)   SpO2 100%   BMI 22.76 kg/m²   Physical Exam  Vitals reviewed.   Constitutional:       Appearance: Normal appearance. She is well-developed.   HENT:      Head: Normocephalic and atraumatic.   Eyes:      Pupils: Pupils are equal, round, and reactive to light.   Neck:      Vascular: No JVD.   Cardiovascular:      Rate and Rhythm: Normal rate and regular rhythm.   Pulmonary:      Effort: Pulmonary effort is normal.      Breath sounds: Normal breath sounds.   Abdominal:      General: Bowel sounds are normal.      Palpations: Abdomen is soft.   Musculoskeletal:         General: No deformity. Normal range of motion.      Cervical back: Normal range of motion and neck supple.   Lymphadenopathy:      Cervical: No cervical adenopathy.   Skin:     General: Skin is warm and dry.   Neurological:      Mental Status: She is alert and oriented to person, place, and time.   Psychiatric:         Behavior: Behavior normal.         Thought Content: Thought content normal.         Judgment: Judgment normal.         BMI is within normal parameters. No other follow-up for BMI required.      Results Reviewed:  Glucose   Date Value Ref Range Status   03/21/2022 107 (H) 70 - 100 mg/dL Final     BUN   Date Value Ref Range Status   03/21/2022 8 5 - 21 mg/dL Final     Creatinine   Date Value Ref Range Status   03/21/2022 0.52 0.50 - 1.40 mg/dL Final     Sodium   Date Value Ref Range Status   03/21/2022 136 135 - 145 mmol/L Final     Potassium   Date Value Ref Range Status   03/21/2022 3.9 3.5 - 5.3 mmol/L Final     Chloride   Date Value Ref Range Status "   03/21/2022 101 98 - 110 mmol/L Final     CO2   Date Value Ref Range Status   03/21/2022 30.0 24.0 - 31.0 mmol/L Final     Calcium   Date Value Ref Range Status   03/21/2022 9.6 8.4 - 10.4 mg/dL Final     ALT (SGPT)   Date Value Ref Range Status   03/21/2022 15 0 - 35 U/L Final     AST (SGOT)   Date Value Ref Range Status   03/21/2022 16 7 - 45 U/L Final     WBC   Date Value Ref Range Status   03/21/2022 5.70 3.40 - 10.80 10*3/mm3 Final   07/22/2021 4.7 3.4 - 10.8 x10E3/uL Final   05/24/2018 5.3 4.0 - 10.5 10*3/uL Final     Hematocrit   Date Value Ref Range Status   03/21/2022 43.4 34.0 - 46.6 % Final   05/24/2018 40.0 37.0 - 47.0 % Final     Platelets   Date Value Ref Range Status   03/21/2022 371 140 - 450 10*3/mm3 Final   05/24/2018 295 150 - 450 10*3/uL Final     Triglycerides   Date Value Ref Range Status   12/02/2021 57 0 - 149 mg/dL Final     HDL Cholesterol   Date Value Ref Range Status   12/02/2021 55 >39 mg/dL Final     LDL Chol Calc (NIH)   Date Value Ref Range Status   12/02/2021 70 0 - 99 mg/dL Final         Assessment / Plan     Assessment/Plan:  Diagnoses and all orders for this visit:    1. Routine general medical examination at a health care facility (Primary)  -     CBC & Differential  -     Comprehensive Metabolic Panel  -     Hemoglobin A1c  -     Lipid Panel  -     TSH  -     T4  -     Vitamin B12    2. Anxiety  -     sertraline (Zoloft) 25 MG tablet; Take 0.5 tablets by mouth Daily.  Dispense: 45 tablet; Refill: 2  -     ALPRAZolam (XANAX) 0.5 MG tablet; Take 1 tablet by mouth 2 (Two) Times a Day As Needed for Anxiety. for anxiety  Dispense: 60 tablet; Refill: 0      Will have lab work here today. Encouraged SBE, pt is aware how to do self breast exam and the importance of same. Discussed weight management and importance of maintaining a healthy weight. Discussed Vitamin D intake and the importance of adequate vitamin D for both Bone Health and a healthy immune system.  Discussed Daily  exercise and the importance of same, in regards to a healthy heart as well as helping to maintain her weight and improving her mental health.  BMI is normal   Mammogram is up to date.         Return in about 6 months (around 11/9/2023). unless patient needs to be seen sooner or acute issues arise.    Code Status: full    I have discussed the patient results/orders and and plan/recommendation with them at today's visit.      Jesusita Patterson, APRN   05/09/2023

## 2023-05-10 LAB
ALBUMIN SERPL-MCNC: 4.3 G/DL (ref 3.8–4.8)
ALBUMIN/GLOB SERPL: 2 {RATIO} (ref 1.2–2.2)
ALP SERPL-CCNC: 55 IU/L (ref 44–121)
ALT SERPL-CCNC: 7 IU/L (ref 0–32)
AST SERPL-CCNC: 11 IU/L (ref 0–40)
BASOPHILS # BLD AUTO: 0 X10E3/UL (ref 0–0.2)
BASOPHILS NFR BLD AUTO: 1 %
BILIRUB SERPL-MCNC: 0.6 MG/DL (ref 0–1.2)
BUN SERPL-MCNC: 6 MG/DL (ref 6–20)
BUN/CREAT SERPL: 9 (ref 9–23)
CALCIUM SERPL-MCNC: 9.2 MG/DL (ref 8.7–10.2)
CHLORIDE SERPL-SCNC: 104 MMOL/L (ref 96–106)
CHOLEST SERPL-MCNC: 136 MG/DL (ref 100–199)
CO2 SERPL-SCNC: 21 MMOL/L (ref 20–29)
CREAT SERPL-MCNC: 0.67 MG/DL (ref 0.57–1)
EGFRCR SERPLBLD CKD-EPI 2021: 114 ML/MIN/1.73
EOSINOPHIL # BLD AUTO: 0.1 X10E3/UL (ref 0–0.4)
EOSINOPHIL NFR BLD AUTO: 3 %
ERYTHROCYTE [DISTWIDTH] IN BLOOD BY AUTOMATED COUNT: 12.4 % (ref 11.7–15.4)
GLOBULIN SER CALC-MCNC: 2.1 G/DL (ref 1.5–4.5)
GLUCOSE SERPL-MCNC: 88 MG/DL (ref 70–99)
HBA1C MFR BLD: 5.3 % (ref 4.8–5.6)
HCT VFR BLD AUTO: 39.9 % (ref 34–46.6)
HDLC SERPL-MCNC: 56 MG/DL
HGB BLD-MCNC: 13.2 G/DL (ref 11.1–15.9)
IMM GRANULOCYTES # BLD AUTO: 0 X10E3/UL (ref 0–0.1)
IMM GRANULOCYTES NFR BLD AUTO: 0 %
LDLC SERPL CALC-MCNC: 68 MG/DL (ref 0–99)
LYMPHOCYTES # BLD AUTO: 1.3 X10E3/UL (ref 0.7–3.1)
LYMPHOCYTES NFR BLD AUTO: 26 %
MCH RBC QN AUTO: 29 PG (ref 26.6–33)
MCHC RBC AUTO-ENTMCNC: 33.1 G/DL (ref 31.5–35.7)
MCV RBC AUTO: 88 FL (ref 79–97)
MONOCYTES # BLD AUTO: 0.4 X10E3/UL (ref 0.1–0.9)
MONOCYTES NFR BLD AUTO: 9 %
NEUTROPHILS # BLD AUTO: 3.2 X10E3/UL (ref 1.4–7)
NEUTROPHILS NFR BLD AUTO: 61 %
PLATELET # BLD AUTO: 298 X10E3/UL (ref 150–450)
POTASSIUM SERPL-SCNC: 4.5 MMOL/L (ref 3.5–5.2)
PROT SERPL-MCNC: 6.4 G/DL (ref 6–8.5)
RBC # BLD AUTO: 4.55 X10E6/UL (ref 3.77–5.28)
SODIUM SERPL-SCNC: 139 MMOL/L (ref 134–144)
T4 SERPL-MCNC: 8 UG/DL (ref 4.5–12)
TRIGL SERPL-MCNC: 53 MG/DL (ref 0–149)
TSH SERPL DL<=0.005 MIU/L-ACNC: 1.15 UIU/ML (ref 0.45–4.5)
VIT B12 SERPL-MCNC: 224 PG/ML (ref 232–1245)
VLDLC SERPL CALC-MCNC: 12 MG/DL (ref 5–40)
WBC # BLD AUTO: 5.1 X10E3/UL (ref 3.4–10.8)

## 2023-08-04 ENCOUNTER — TELEPHONE (OUTPATIENT)
Dept: INTERNAL MEDICINE | Facility: CLINIC | Age: 38
End: 2023-08-04

## 2023-08-04 NOTE — TELEPHONE ENCOUNTER
"  Caller: Mago Gamboa \"Shelly\"     Relationship: SELF     Best call back number:     [phone]        What is your medical concern?  STATES SHE HAS SOME CONTACT DERMATITIS ON HER FACE. DOESN'T WANT AN APPOINTMENT CAN NOT COME IN DUE TO SHORT STAFFING. WANTS TO KNOW IF SHE CAN GET SOME STEROIDS OR WHAT SHE CAN DO. DID NOT WANT NEXT AVAILABLE VIRTUAL APPT     How long has this issue been going on? FEW DAYS     Is your provider already aware of this issue? NO    Have you been treated for this issue? NO      "

## 2023-08-07 RX ORDER — METHYLPREDNISOLONE 4 MG/1
TABLET ORAL
Qty: 21 TABLET | Refills: 1 | Status: SHIPPED | OUTPATIENT
Start: 2023-08-07

## 2023-09-27 DIAGNOSIS — F41.9 ANXIETY: ICD-10-CM

## 2023-09-27 RX ORDER — ALPRAZOLAM 0.5 MG/1
0.5 TABLET ORAL 2 TIMES DAILY PRN
Qty: 60 TABLET | Refills: 0 | Status: CANCELLED | OUTPATIENT
Start: 2023-09-27

## 2023-09-28 RX ORDER — SERTRALINE HYDROCHLORIDE 25 MG/1
12.5 TABLET, FILM COATED ORAL DAILY
Qty: 45 TABLET | Refills: 2 | Status: SHIPPED | OUTPATIENT
Start: 2023-09-28

## 2023-09-28 NOTE — TELEPHONE ENCOUNTER
Rx Refill Note  Requested Prescriptions     Pending Prescriptions Disp Refills    sertraline (Zoloft) 25 MG tablet 45 tablet 2     Sig: Take 0.5 tablets by mouth Daily.      Last office visit with prescribing clinician: 5/9/2023   Next office visit with prescribing clinician: 10/19/2023                         Would you like a call back once the refill request has been completed: [] Yes [] No    If the office needs to give you a call back, can they leave a voicemail: [] Yes [] No    Rosemarie Borja RN  09/28/23, 09:08 CDT

## 2023-09-28 NOTE — TELEPHONE ENCOUNTER
Patient needs to come to [facility] for UDS and to sign CSA per Pinky. Also needs 3 month follow up appointment from last visit - so is due now.

## 2023-09-28 NOTE — TELEPHONE ENCOUNTER
PT ON VAC AND COMING IN 10/19 FOR APPT WITH DOROTEO.  SHE WILL CHECK HER SCHEDULE AND LET ME KNOW IF SHE CAN COME IN EARLY FOR THE UDS/CSA, IF NOT SHE WILL AT HER APPT.

## 2023-10-04 ENCOUNTER — TRANSCRIBE ORDERS (OUTPATIENT)
Dept: ADMINISTRATIVE | Facility: HOSPITAL | Age: 38
End: 2023-10-04
Payer: COMMERCIAL

## 2023-10-04 DIAGNOSIS — Z12.31 ENCOUNTER FOR SCREENING MAMMOGRAM FOR MALIGNANT NEOPLASM OF BREAST: Primary | ICD-10-CM

## 2023-10-18 LAB
NCCN CRITERIA FLAG: NORMAL
TYRER CUZICK SCORE: 9.7

## 2023-10-19 ENCOUNTER — OFFICE VISIT (OUTPATIENT)
Dept: INTERNAL MEDICINE | Facility: CLINIC | Age: 38
End: 2023-10-19
Payer: COMMERCIAL

## 2023-10-19 ENCOUNTER — HOSPITAL ENCOUNTER (OUTPATIENT)
Dept: MAMMOGRAPHY | Facility: HOSPITAL | Age: 38
Discharge: HOME | End: 2023-10-19
Admitting: NURSE PRACTITIONER
Payer: COMMERCIAL

## 2023-10-19 VITALS
HEIGHT: 66 IN | WEIGHT: 144 LBS | TEMPERATURE: 99.3 F | DIASTOLIC BLOOD PRESSURE: 76 MMHG | RESPIRATION RATE: 18 BRPM | HEART RATE: 96 BPM | SYSTOLIC BLOOD PRESSURE: 113 MMHG | OXYGEN SATURATION: 98 % | BODY MASS INDEX: 23.14 KG/M2

## 2023-10-19 DIAGNOSIS — F41.9 ANXIETY: Primary | ICD-10-CM

## 2023-10-19 DIAGNOSIS — R06.02 SHORTNESS OF BREATH: ICD-10-CM

## 2023-10-19 DIAGNOSIS — M54.2 CERVICALGIA: ICD-10-CM

## 2023-10-19 DIAGNOSIS — Z12.31 ENCOUNTER FOR SCREENING MAMMOGRAM FOR MALIGNANT NEOPLASM OF BREAST: ICD-10-CM

## 2023-10-19 DIAGNOSIS — Z79.899 ENCOUNTER FOR LONG-TERM (CURRENT) USE OF OTHER MEDICATIONS: ICD-10-CM

## 2023-10-19 PROCEDURE — 99214 OFFICE O/P EST MOD 30 MIN: CPT | Performed by: NURSE PRACTITIONER

## 2023-10-19 PROCEDURE — 77067 SCR MAMMO BI INCL CAD: CPT

## 2023-10-19 PROCEDURE — 77063 BREAST TOMOSYNTHESIS BI: CPT

## 2023-10-19 RX ORDER — ALPRAZOLAM 0.5 MG/1
0.5 TABLET ORAL 2 TIMES DAILY PRN
Qty: 60 TABLET | Refills: 0 | Status: SHIPPED | OUTPATIENT
Start: 2023-10-19

## 2023-10-19 NOTE — PROGRESS NOTES
Subjective     Chief Complaint   Patient presents with    Anxiety         Pt comes in today to follow up on anxiety. She is on Xanax and takes it prn. She went on vacation and her anxiety worsened. She ended up coming home early because of her anxiety. Her last fill was May 2023. She started having some chest pain yesterday and had elevated heart rate with PAC's. She had indigestion. No vomiting.   Patient's PMR from outside medical facility reviewed and noted. Her weight has been stable. She had her mammogram today.   She has been getting massages for her cervical myofascial pain. She is doing some better. Still with some shortness of breath which is worse with anxiety. She is off Zoloft. She weaned herself off.     Review of Systems   Otherwise complete ROS reviewed and negative except as mentioned in the HPI.    Past Medical History:   Past Medical History:   Diagnosis Date    Abnormal ECG     Allergic 2005    Anxiety 2010    Arrhythmia     Asthma, extrinsic     Cervical dystonia 11/30/2022    Gastroparesis 12/23/2021    GERD (gastroesophageal reflux disease)     Globus sensation 6/15/2021    Headache 2002    Intractable nausea and vomiting 6/15/2021    Mild intermittent asthma without complication 6/15/2021    Tachycardia     Vocal cord dysfunction 5/4/2020     Past Surgical History:  Past Surgical History:   Procedure Laterality Date    ADENOIDECTOMY      APPENDECTOMY      CARDIAC SURGERY      loop recorder    CHOLECYSTECTOMY      COLONOSCOPY  2015    ENDOMETRIAL ABLATION      ENDOSCOPY N/A 7/26/2021    Procedure: ESOPHAGOGASTRODUODENOSCOPY WITH ANESTHESIA;  Surgeon: Keegan Pope DO;  Location: [facility] ENDOSCOPY;  Service: Gastroenterology;  Laterality: N/A;  pre GERD  post  jesus varela APRJOSE ALBERTO    HYSTERECTOMY      SUBTOTAL HYSTERECTOMY  2016    TONSILLECTOMY       Social History:  reports that she has never smoked. She has never used smokeless tobacco. She reports that she does not currently use  alcohol. She reports that she does not use drugs.    Family History: family history includes COPD in her maternal grandmother; Cancer in her maternal grandfather; Heart disease in her maternal grandmother; No Known Problems in her father and mother; Stroke in her maternal grandfather. She was adopted.       Allergies:  Allergies   Allergen Reactions    Compazine [Prochlorperazine] Palpitations    Iodinated Contrast Media Anaphylaxis    Nortriptyline Hcl Rash    Sulfa Antibiotics Angioedema, Other (See Comments) and Rash     breathing difficulty    breathing difficulty    Other reaction(s): Angioedema, Other (See Comments)  breathing difficulty     Medications:  Prior to Admission medications    Medication Sig Start Date End Date Taking? Authorizing Provider   albuterol (PROVENTIL) (2.5 MG/3ML) 0.083% nebulizer solution INHALE 3 ML PO Q 4 - 6 H PRM    Albaro Kenyon MD   albuterol sulfate  (90 Base) MCG/ACT inhaler Inhale 2 puffs Every 4 (Four) Hours As Needed for Wheezing. 9/3/21   Jesusita Patterson APRN   ALPRAZolam (XANAX) 0.5 MG tablet Take 1 tablet by mouth 2 (Two) Times a Day As Needed for Anxiety. for anxiety 5/9/23   Jesusita Patterson APRN   Ascorbic Acid (DEANN-C PO) Take  by mouth.    Albaro Kenyon MD   Cholecalciferol (Vitamin D3) 50 MCG (2000 UT) capsule Take 1 capsule by mouth Daily.    Albaro Kenyon MD   cyclobenzaprine (FLEXERIL) 5 MG tablet cyclobenzaprine 5 mg tablet   Take 1 tablet as needed by oral route at bedtime for 30 days.    Albaro Kenyon MD   loratadine (CLARITIN) 10 MG tablet Daily.    Albaro Kenyon MD   methylPREDNISolone (MEDROL) 4 MG dose pack Take as directed on package instructions. 8/7/23   Jesusita Patterson APRN   Multiple Vitamins-Minerals (ZINC PO) Take  by mouth.    Albaro Kenyon MD   mupirocin (BACTROBAN) 2 % ointment Apply 1 application topically to the appropriate area as directed 3 (Three) Times a Day.  "12/29/21   Leonarda Schafer   ondansetron ODT (ZOFRAN-ODT) 8 MG disintegrating tablet Place 1 tablet on the tongue Every 8 (Eight) Hours As Needed for Nausea or Vomiting. 12/2/21   Jesusita Patterson APRN   sertraline (Zoloft) 25 MG tablet Take 0.5 tablets by mouth Daily. 9/28/23   Jesusita Patterson APRN   triamcinolone (KENALOG) 0.1 % ointment Apply 1 application  topically to the appropriate area as directed 2 (Two) Times a Day. 8/4/23   Jesusita Patterson APRN       Objective     Vital Signs: /76   Pulse 96   Temp 99.3 °F (37.4 °C)   Resp 18   Ht 167.6 cm (66\")   Wt 65.3 kg (144 lb)   SpO2 98%   BMI 23.24 kg/m²   Physical Exam  Vitals reviewed.   Constitutional:       Appearance: Normal appearance. She is well-developed.   HENT:      Head: Normocephalic and atraumatic.   Eyes:      Pupils: Pupils are equal, round, and reactive to light.   Neck:      Vascular: No JVD.   Cardiovascular:      Rate and Rhythm: Normal rate and regular rhythm.   Pulmonary:      Effort: Pulmonary effort is normal.   Abdominal:      General: Bowel sounds are normal.      Palpations: Abdomen is soft.   Musculoskeletal:         General: No deformity.      Cervical back: Normal range of motion and neck supple.   Lymphadenopathy:      Cervical: No cervical adenopathy.   Skin:     General: Skin is warm and dry.   Neurological:      Mental Status: She is alert and oriented to person, place, and time.   Psychiatric:         Behavior: Behavior normal.         Thought Content: Thought content normal.         Judgment: Judgment normal.         BMI is within normal parameters. No other follow-up for BMI required.      Results Reviewed:  Glucose   Date Value Ref Range Status   05/09/2023 88 70 - 99 mg/dL Final   03/21/2022 107 (H) 70 - 100 mg/dL Final     BUN   Date Value Ref Range Status   05/09/2023 6 6 - 20 mg/dL Final   03/21/2022 8 5 - 21 mg/dL Final     Creatinine   Date Value Ref Range Status   05/09/2023 " 0.67 0.57 - 1.00 mg/dL Final   03/21/2022 0.52 0.50 - 1.40 mg/dL Final     Sodium   Date Value Ref Range Status   05/09/2023 139 134 - 144 mmol/L Final   03/21/2022 136 135 - 145 mmol/L Final     Potassium   Date Value Ref Range Status   05/09/2023 4.5 3.5 - 5.2 mmol/L Final   03/21/2022 3.9 3.5 - 5.3 mmol/L Final     Chloride   Date Value Ref Range Status   05/09/2023 104 96 - 106 mmol/L Final   03/21/2022 101 98 - 110 mmol/L Final     CO2   Date Value Ref Range Status   03/21/2022 30.0 24.0 - 31.0 mmol/L Final     Total CO2   Date Value Ref Range Status   05/09/2023 21 20 - 29 mmol/L Final     Calcium   Date Value Ref Range Status   05/09/2023 9.2 8.7 - 10.2 mg/dL Final   03/21/2022 9.6 8.4 - 10.4 mg/dL Final     ALT (SGPT)   Date Value Ref Range Status   05/09/2023 7 0 - 32 IU/L Final   03/21/2022 15 0 - 35 U/L Final     AST (SGOT)   Date Value Ref Range Status   05/09/2023 11 0 - 40 IU/L Final   03/21/2022 16 7 - 45 U/L Final     WBC   Date Value Ref Range Status   05/09/2023 5.1 3.4 - 10.8 x10E3/uL Final   05/24/2018 5.3 4.0 - 10.5 10*3/uL Final     Hematocrit   Date Value Ref Range Status   05/09/2023 39.9 34.0 - 46.6 % Final   03/21/2022 43.4 34.0 - 46.6 % Final   05/24/2018 40.0 37.0 - 47.0 % Final     Platelets   Date Value Ref Range Status   05/09/2023 298 150 - 450 x10E3/uL Final   03/21/2022 371 140 - 450 10*3/mm3 Final   05/24/2018 295 150 - 450 10*3/uL Final     Triglycerides   Date Value Ref Range Status   05/09/2023 53 0 - 149 mg/dL Final     HDL Cholesterol   Date Value Ref Range Status   05/09/2023 56 >39 mg/dL Final     LDL Chol Calc (NIH)   Date Value Ref Range Status   05/09/2023 68 0 - 99 mg/dL Final     Hemoglobin A1C   Date Value Ref Range Status   05/09/2023 5.3 4.8 - 5.6 % Final     Comment:              Prediabetes: 5.7 - 6.4           Diabetes: >6.4           Glycemic control for adults with diabetes: <7.0           Assessment / Plan     Assessment/Plan:  Diagnoses and all orders for this  visit:    1. Anxiety (Primary)  -     ALPRAZolam (XANAX) 0.5 MG tablet; Take 1 tablet by mouth 2 (Two) Times a Day As Needed for Anxiety. for anxiety  Dispense: 60 tablet; Refill: 0    2. Shortness of breath  -     Ambulatory Referral to Physical Therapy Evaluate and treat; Electrotherapy; Soft Tissue Mobilizaton    3. Cervicalgia  -     Ambulatory Referral to Physical Therapy Evaluate and treat; Electrotherapy; Soft Tissue Mobilizaton    4. Encounter for long-term (current) use of other medications  -     Cancel:  POC Urine Drug Screen  -     Urine Drug Screen - Urine, Clean Catch    Other orders  -     Specific Gravity      No follow-ups on file. unless patient needs to be seen sooner or acute issues arise.    Code Status: Full.     I have discussed the patient results/orders and and plan/recommendation with them at today's visit.      Jesusita Patterson, APRN   10/19/2023

## 2023-10-23 LAB
AMPHETAMINES UR QL SCN: NEGATIVE NG/ML
BARBITURATES UR QL SCN: NEGATIVE NG/ML
BENZODIAZ UR QL SCN: NEGATIVE NG/ML
BZE UR QL SCN: NEGATIVE NG/ML
CANNABINOIDS UR QL SCN: NEGATIVE NG/ML
CREAT UR-MCNC: 13.3 MG/DL (ref 20–300)
LABORATORY COMMENT REPORT: ABNORMAL
METHADONE UR QL SCN: NEGATIVE NG/ML
OPIATES UR QL SCN: NEGATIVE NG/ML
OXYCODONE+OXYMORPHONE UR QL SCN: NEGATIVE NG/ML
PCP UR QL: NEGATIVE NG/ML
PH UR: 6.4 [PH] (ref 4.5–8.9)
PROPOXYPH UR QL SCN: NEGATIVE NG/ML
SP GR UR: 1

## 2023-11-20 DIAGNOSIS — R11.2 INTRACTABLE VOMITING WITH NAUSEA: ICD-10-CM

## 2023-11-20 RX ORDER — ONDANSETRON 8 MG/1
8 TABLET, ORALLY DISINTEGRATING ORAL EVERY 8 HOURS PRN
Qty: 90 TABLET | Refills: 5 | Status: SHIPPED | OUTPATIENT
Start: 2023-11-20

## 2023-11-29 ENCOUNTER — PATIENT MESSAGE (OUTPATIENT)
Dept: INTERNAL MEDICINE | Facility: CLINIC | Age: 38
End: 2023-11-29
Payer: COMMERCIAL

## 2023-11-29 DIAGNOSIS — R53.83 OTHER FATIGUE: ICD-10-CM

## 2023-11-29 DIAGNOSIS — R06.02 SHORTNESS OF BREATH: Primary | ICD-10-CM

## 2023-11-30 ENCOUNTER — LAB (OUTPATIENT)
Dept: LAB | Facility: HOSPITAL | Age: 38
End: 2023-11-30
Payer: COMMERCIAL

## 2023-11-30 DIAGNOSIS — R06.02 SHORTNESS OF BREATH: ICD-10-CM

## 2023-11-30 DIAGNOSIS — R53.83 OTHER FATIGUE: ICD-10-CM

## 2023-11-30 LAB
ALBUMIN SERPL-MCNC: 4.3 G/DL (ref 3.5–5)
ALBUMIN/GLOB SERPL: 1.3 G/DL (ref 1.1–2.5)
ALP SERPL-CCNC: 61 U/L (ref 24–120)
ALT SERPL W P-5'-P-CCNC: 16 U/L (ref 0–35)
ANION GAP SERPL CALCULATED.3IONS-SCNC: 9 MMOL/L (ref 4–13)
AST SERPL-CCNC: 19 U/L (ref 7–45)
AUTO MIXED CELLS #: 0.4 10*3/MM3 (ref 0.1–2.6)
AUTO MIXED CELLS %: 8 % (ref 0.1–24)
BILIRUB SERPL-MCNC: 0.9 MG/DL (ref 0.1–1)
BUN SERPL-MCNC: 4 MG/DL (ref 5–21)
BUN/CREAT SERPL: 6
CALCIUM SPEC-SCNC: 9.5 MG/DL (ref 8.4–10.4)
CHLORIDE SERPL-SCNC: 104 MMOL/L (ref 98–110)
CO2 SERPL-SCNC: 27 MMOL/L (ref 24–31)
CREAT SERPL-MCNC: 0.67 MG/DL (ref 0.5–1.4)
EGFRCR SERPLBLD CKD-EPI 2021: 114.2 ML/MIN/1.73
ERYTHROCYTE [DISTWIDTH] IN BLOOD BY AUTOMATED COUNT: 12.2 % (ref 12.3–15.4)
GLOBULIN UR ELPH-MCNC: 3.2 GM/DL
GLUCOSE SERPL-MCNC: 101 MG/DL (ref 70–100)
HCT VFR BLD AUTO: 40.6 % (ref 34–46.6)
HGB BLD-MCNC: 13.5 G/DL (ref 12–15.9)
IRON 24H UR-MRATE: 62 MCG/DL (ref 37–145)
IRON SATN MFR SERPL: 17 % (ref 20–50)
LYMPHOCYTES # BLD AUTO: 1.3 10*3/MM3 (ref 0.7–3.1)
LYMPHOCYTES NFR BLD AUTO: 28.7 % (ref 19.6–45.3)
MCH RBC QN AUTO: 29 PG (ref 26.6–33)
MCHC RBC AUTO-ENTMCNC: 33.3 G/DL (ref 31.5–35.7)
MCV RBC AUTO: 87.3 FL (ref 79–97)
NEUTROPHILS NFR BLD AUTO: 2.7 10*3/MM3 (ref 1.7–7)
NEUTROPHILS NFR BLD AUTO: 63.3 % (ref 42.7–76)
PLATELET # BLD AUTO: 274 10*3/MM3 (ref 140–450)
PMV BLD AUTO: 10.2 FL (ref 6–12)
POTASSIUM SERPL-SCNC: 3.9 MMOL/L (ref 3.5–5.3)
PROT SERPL-MCNC: 7.5 G/DL (ref 6.3–8.7)
RBC # BLD AUTO: 4.65 10*6/MM3 (ref 3.77–5.28)
SODIUM SERPL-SCNC: 140 MMOL/L (ref 135–145)
TIBC SERPL-MCNC: 365 MCG/DL (ref 298–536)
TRANSFERRIN SERPL-MCNC: 245 MG/DL (ref 200–360)
VIT B12 BLD-MCNC: 427 PG/ML (ref 211–946)
WBC NRBC COR # BLD AUTO: 4.4 10*3/MM3 (ref 3.4–10.8)

## 2023-11-30 PROCEDURE — 83540 ASSAY OF IRON: CPT

## 2023-11-30 PROCEDURE — 36415 COLL VENOUS BLD VENIPUNCTURE: CPT

## 2023-11-30 PROCEDURE — 84466 ASSAY OF TRANSFERRIN: CPT

## 2023-11-30 PROCEDURE — 82607 VITAMIN B-12: CPT

## 2023-11-30 PROCEDURE — 80053 COMPREHEN METABOLIC PANEL: CPT

## 2023-11-30 PROCEDURE — 85025 COMPLETE CBC W/AUTO DIFF WBC: CPT

## 2023-12-28 ENCOUNTER — OFFICE VISIT (OUTPATIENT)
Dept: INTERNAL MEDICINE | Facility: CLINIC | Age: 38
End: 2023-12-28
Payer: COMMERCIAL

## 2023-12-28 VITALS
WEIGHT: 140 LBS | BODY MASS INDEX: 22.5 KG/M2 | OXYGEN SATURATION: 99 % | HEIGHT: 66 IN | RESPIRATION RATE: 17 BRPM | HEART RATE: 86 BPM | SYSTOLIC BLOOD PRESSURE: 122 MMHG | TEMPERATURE: 97.5 F | DIASTOLIC BLOOD PRESSURE: 77 MMHG

## 2023-12-28 DIAGNOSIS — R10.11 RUQ ABDOMINAL PAIN: Primary | ICD-10-CM

## 2023-12-28 DIAGNOSIS — Z79.899 ENCOUNTER FOR LONG-TERM (CURRENT) USE OF OTHER MEDICATIONS: ICD-10-CM

## 2023-12-28 DIAGNOSIS — R10.10 PAIN OF UPPER ABDOMEN: ICD-10-CM

## 2023-12-28 PROCEDURE — 99214 OFFICE O/P EST MOD 30 MIN: CPT | Performed by: NURSE PRACTITIONER

## 2023-12-28 RX ORDER — CHLORDIAZEPOXIDE HYDROCHLORIDE 5 MG/1
5 CAPSULE, GELATIN COATED ORAL
COMMUNITY

## 2023-12-28 RX ORDER — DEXLANSOPRAZOLE 60 MG/1
60 CAPSULE, DELAYED RELEASE ORAL DAILY
COMMUNITY

## 2023-12-28 RX ORDER — ESOMEPRAZOLE MAGNESIUM 40 MG/1
40 CAPSULE, DELAYED RELEASE ORAL EVERY 24 HOURS
COMMUNITY

## 2023-12-28 NOTE — PROGRESS NOTES
Subjective     Chief Complaint   Patient presents with    Pain       Pain    1 month ago RUQ pain mid abdomen.  Dull pain and it went away, has now coming and going. Nothing makes it better or worse. GB has been removed. No relation to a bowel movement. She does carry a history of multiple abdominal surgeries. This does not affect her activity or food intake. She has chronic diarrhea. She is on questran and is having daily to twice daily bowel movements. Is having minimal nausea.     She went to physical therapy and her schedule was not amiable to proceeding. She is going to try dry needling for her neck/shoulder pain    She is due for her drug screen for her Xanax.     Review of Systems   Otherwise complete ROS reviewed and negative except as mentioned in the HPI.    Past Medical History:   Past Medical History:   Diagnosis Date    Abnormal ECG     Allergic 2005    Anxiety 2010    Arrhythmia     Asthma, extrinsic     Cervical dystonia 11/30/2022    Gastroparesis 12/23/2021    GERD (gastroesophageal reflux disease)     Globus sensation 6/15/2021    Headache 2002    Intractable nausea and vomiting 6/15/2021    Mild intermittent asthma without complication 6/15/2021    Tachycardia     Vocal cord dysfunction 5/4/2020     Past Surgical History:  Past Surgical History:   Procedure Laterality Date    ADENOIDECTOMY      APPENDECTOMY      CARDIAC SURGERY      loop recorder    CHOLECYSTECTOMY      COLONOSCOPY  2015    ENDOMETRIAL ABLATION      ENDOSCOPY N/A 7/26/2021    Procedure: ESOPHAGOGASTRODUODENOSCOPY WITH ANESTHESIA;  Surgeon: Keegan Pope DO;  Location: [facility] ENDOSCOPY;  Service: Gastroenterology;  Laterality: N/A;  pre GERD  post  jesus varela APRJOSE ALBERTO    HYSTERECTOMY      SUBTOTAL HYSTERECTOMY  2016    TONSILLECTOMY       Social History:  reports that she has never smoked. She has never used smokeless tobacco. She reports that she does not currently use alcohol. She reports that she does not use  drugs.    Family History: family history includes COPD in her maternal grandmother; Cancer in her maternal grandfather; Heart disease in her maternal grandmother; No Known Problems in her father and mother; Stroke in her maternal grandfather. She was adopted.       Allergies:  Allergies   Allergen Reactions    Iodinated Contrast Media Anaphylaxis    Nortriptyline Hcl Rash    Prochlorperazine Palpitations and Shortness Of Breath    Sulfa Antibiotics Angioedema, Other (See Comments), Rash and Unknown - Low Severity     breathing difficulty    Other reaction(s): Angioedema, Other (See Comments)     Medications:  Prior to Admission medications    Medication Sig Start Date End Date Taking? Authorizing Provider   albuterol (PROVENTIL) (2.5 MG/3ML) 0.083% nebulizer solution INHALE 3 ML PO Q 4 - 6 H PRM   Yes Albaro Kenyon MD   albuterol sulfate  (90 Base) MCG/ACT inhaler Inhale 2 puffs Every 4 (Four) Hours As Needed for Wheezing. 9/3/21  Yes Jesusita Patterson APRN   ALPRAZolam (XANAX) 0.5 MG tablet Take 1 tablet by mouth 2 (Two) Times a Day As Needed for Anxiety. for anxiety 10/19/23  Yes Jesusita Patterson APRN   Ascorbic Acid (DEANN-C PO) Take  by mouth.   Yes Albaro Kenyon MD   chlordiazePOXIDE (LIBRIUM) 5 MG capsule Take 1 capsule by mouth.   Yes Albaro Kenyon MD   Cholecalciferol (Vitamin D3) 50 MCG (2000 UT) capsule Take 1 capsule by mouth Daily.   Yes Albaro Kenyon MD   cholestyramine light 4 g powder Take 1 packet by mouth 2 (Two) Times a Day. 12/8/23  Yes Jesusita Patterson APRN   cyclobenzaprine (FLEXERIL) 5 MG tablet cyclobenzaprine 5 mg tablet   Take 1 tablet as needed by oral route at bedtime for 30 days.   Yes Albaro Kenyon MD   loratadine (CLARITIN) 10 MG tablet Daily.   Yes Albaro Kenyon MD   Multiple Vitamins-Minerals (ZINC PO) Take  by mouth.   Yes Albaro Kenyon MD   mupirocin (BACTROBAN) 2 % ointment Apply 1 application  "topically to the appropriate area as directed 3 (Three) Times a Day. 12/29/21  Yes Leonarda Schafer   ondansetron ODT (ZOFRAN-ODT) 8 MG disintegrating tablet Place 1 tablet on the tongue Every 8 (Eight) Hours As Needed for Nausea or Vomiting. 11/20/23  Yes Jesusita Patterson APRN   sertraline (Zoloft) 25 MG tablet Take 0.5 tablets by mouth Daily. 9/28/23  Yes Jesusita Patterson APRN   triamcinolone (KENALOG) 0.1 % ointment Apply 1 application  topically to the appropriate area as directed 2 (Two) Times a Day. 8/4/23  Yes Jesusita Patterson APRN   dexlansoprazole (Dexilant) 60 MG capsule Take 1 capsule by mouth Daily.    Provider, MD Albaro   esomeprazole (nexIUM) 40 MG capsule Take 1 capsule by mouth Daily.    Provider, MD Albaro   methylPREDNISolone (MEDROL) 4 MG dose pack Take as directed on package instructions. 8/7/23   Jesusita Patterson APRN       Objective     Vital Signs: /77   Pulse 86   Temp 97.5 °F (36.4 °C)   Resp 17   Ht 167.6 cm (66\")   Wt 63.5 kg (140 lb)   SpO2 99%   BMI 22.60 kg/m²   Physical Exam  Vitals reviewed.   Constitutional:       Appearance: She is well-developed.   HENT:      Head: Normocephalic and atraumatic.   Eyes:      Pupils: Pupils are equal, round, and reactive to light.   Neck:      Vascular: No JVD.   Cardiovascular:      Rate and Rhythm: Normal rate and regular rhythm.   Pulmonary:      Effort: Pulmonary effort is normal.   Abdominal:      General: Bowel sounds are normal.      Palpations: Abdomen is soft.      Tenderness: There is abdominal tenderness (right upper quad and right mid abdomen.).   Musculoskeletal:         General: No deformity.      Cervical back: Normal range of motion and neck supple.   Lymphadenopathy:      Cervical: No cervical adenopathy.   Skin:     General: Skin is warm and dry.   Neurological:      Mental Status: She is alert and oriented to person, place, and time.   Psychiatric:         Behavior: " Behavior normal.         Thought Content: Thought content normal.         Judgment: Judgment normal.       BMI is within normal parameters. No other follow-up for BMI required.      Results Reviewed:  Glucose   Date Value Ref Range Status   11/30/2023 101 (H) 70 - 100 mg/dL Final     BUN   Date Value Ref Range Status   11/30/2023 4 (L) 5 - 21 mg/dL Final     Creatinine   Date Value Ref Range Status   11/30/2023 0.67 0.50 - 1.40 mg/dL Final     Sodium   Date Value Ref Range Status   11/30/2023 140 135 - 145 mmol/L Final     Potassium   Date Value Ref Range Status   11/30/2023 3.9 3.5 - 5.3 mmol/L Final     Chloride   Date Value Ref Range Status   11/30/2023 104 98 - 110 mmol/L Final     CO2   Date Value Ref Range Status   11/30/2023 27.0 24.0 - 31.0 mmol/L Final     Calcium   Date Value Ref Range Status   11/30/2023 9.5 8.4 - 10.4 mg/dL Final     ALT (SGPT)   Date Value Ref Range Status   11/30/2023 16 0 - 35 U/L Final     AST (SGOT)   Date Value Ref Range Status   11/30/2023 19 7 - 45 U/L Final     WBC   Date Value Ref Range Status   11/30/2023 4.40 3.40 - 10.80 10*3/mm3 Final   05/09/2023 5.1 3.4 - 10.8 x10E3/uL Final   05/24/2018 5.3 4.0 - 10.5 10*3/uL Final     Hematocrit   Date Value Ref Range Status   11/30/2023 40.6 34.0 - 46.6 % Final   05/24/2018 40.0 37.0 - 47.0 % Final     Platelets   Date Value Ref Range Status   11/30/2023 274 140 - 450 10*3/mm3 Final   05/24/2018 295 150 - 450 10*3/uL Final     Triglycerides   Date Value Ref Range Status   05/09/2023 53 0 - 149 mg/dL Final     HDL Cholesterol   Date Value Ref Range Status   05/09/2023 56 >39 mg/dL Final     LDL Chol Calc (NIH)   Date Value Ref Range Status   05/09/2023 68 0 - 99 mg/dL Final     Hemoglobin A1C   Date Value Ref Range Status   05/09/2023 5.3 4.8 - 5.6 % Final     Comment:              Prediabetes: 5.7 - 6.4           Diabetes: >6.4           Glycemic control for adults with diabetes: <7.0           Assessment / Plan      Assessment/Plan:  Diagnoses and all orders for this visit:    1. RUQ abdominal pain (Primary)  -     Cancel: CT Abdomen Pelvis Without Contrast; Future  -     CT Abdomen Pelvis Without Contrast; Future    2. Pain of upper abdomen  -     Cancel: CT Abdomen Pelvis Without Contrast; Future  -     CT Abdomen Pelvis Without Contrast; Future      Differentials include hernia and or scar tissue.  She does have a history of multiple abdominal surgeries at least 5.  Given the fact that this pain is dull but she is tender 2 cm I do not feel a significant hernia defect on exam  No follow-ups on file. unless patient needs to be seen sooner or acute issues arise.    Code Status: Full.     I have discussed the patient results/orders and and plan/recommendation with them at today's visit.      Jesusita Patterson, APRN   12/28/2023

## 2024-01-11 ENCOUNTER — HOSPITAL ENCOUNTER (OUTPATIENT)
Dept: CT IMAGING | Facility: HOSPITAL | Age: 39
Discharge: HOME | End: 2024-01-11
Admitting: NURSE PRACTITIONER
Payer: COMMERCIAL

## 2024-01-11 DIAGNOSIS — R10.11 RUQ ABDOMINAL PAIN: ICD-10-CM

## 2024-01-11 DIAGNOSIS — R10.10 PAIN OF UPPER ABDOMEN: ICD-10-CM

## 2024-01-11 PROCEDURE — 74176 CT ABD & PELVIS W/O CONTRAST: CPT

## 2024-02-02 DIAGNOSIS — F41.9 ANXIETY: ICD-10-CM

## 2024-02-02 RX ORDER — ALPRAZOLAM 0.5 MG/1
0.5 TABLET ORAL 2 TIMES DAILY PRN
Qty: 60 TABLET | Refills: 0 | Status: SHIPPED | OUTPATIENT
Start: 2024-02-02

## 2024-02-02 NOTE — TELEPHONE ENCOUNTER
Rx Refill Note  Pending Prescriptions:                       Disp   Refills    ALPRAZolam (XANAX) 0.5 MG tablet           60 tab*0        Sig: Take 1 tablet by mouth 2 (Two) Times a Day As Needed           for Anxiety. for anxiety    Last office visit with office: 12/28/2023  Next office visit with office: 04/18/2024  UDS: Urine Drug Screen - Urine, Clean Catch (10/19/2023 16:15)  DATE OF LAST REFILL: 10/19/2023  Controlled Substance Agreement: up to date        Sakshi Hall MA  02/02/24, 09:57 CST

## 2024-02-09 ENCOUNTER — HOSPITAL ENCOUNTER (EMERGENCY)
Facility: HOSPITAL | Age: 39
Discharge: HOME | End: 2024-02-09
Payer: COMMERCIAL

## 2024-02-09 ENCOUNTER — APPOINTMENT (OUTPATIENT)
Dept: GENERAL RADIOLOGY | Facility: HOSPITAL | Age: 39
End: 2024-02-09
Payer: COMMERCIAL

## 2024-02-09 VITALS
TEMPERATURE: 99.3 F | OXYGEN SATURATION: 100 % | SYSTOLIC BLOOD PRESSURE: 122 MMHG | DIASTOLIC BLOOD PRESSURE: 85 MMHG | HEART RATE: 93 BPM | WEIGHT: 135 LBS | BODY MASS INDEX: 21.69 KG/M2 | HEIGHT: 66 IN | RESPIRATION RATE: 18 BRPM

## 2024-02-09 DIAGNOSIS — R07.9 CHEST PAIN, UNSPECIFIED TYPE: Primary | ICD-10-CM

## 2024-02-09 DIAGNOSIS — R00.2 PALPITATIONS: ICD-10-CM

## 2024-02-09 DIAGNOSIS — B34.9 VIRAL ILLNESS: ICD-10-CM

## 2024-02-09 LAB
ALBUMIN SERPL-MCNC: 4.7 G/DL (ref 3.5–5.2)
ALBUMIN/GLOB SERPL: 1.7 G/DL
ALP SERPL-CCNC: 54 U/L (ref 39–117)
ALT SERPL W P-5'-P-CCNC: 10 U/L (ref 1–33)
ANION GAP SERPL CALCULATED.3IONS-SCNC: 13 MMOL/L (ref 5–15)
AST SERPL-CCNC: 11 U/L (ref 1–32)
BASOPHILS # BLD AUTO: 0.03 10*3/MM3 (ref 0–0.2)
BASOPHILS NFR BLD AUTO: 0.5 % (ref 0–1.5)
BILIRUB SERPL-MCNC: 0.9 MG/DL (ref 0–1.2)
BUN SERPL-MCNC: 8 MG/DL (ref 6–20)
BUN/CREAT SERPL: 11.4 (ref 7–25)
CALCIUM SPEC-SCNC: 9.4 MG/DL (ref 8.6–10.5)
CHLORIDE SERPL-SCNC: 102 MMOL/L (ref 98–107)
CO2 SERPL-SCNC: 24 MMOL/L (ref 22–29)
CREAT SERPL-MCNC: 0.7 MG/DL (ref 0.57–1)
D DIMER PPP FEU-MCNC: 0.34 MCGFEU/ML (ref 0–0.5)
DEPRECATED RDW RBC AUTO: 40.1 FL (ref 37–54)
EGFRCR SERPLBLD CKD-EPI 2021: 112.3 ML/MIN/1.73
EOSINOPHIL # BLD AUTO: 0.03 10*3/MM3 (ref 0–0.4)
EOSINOPHIL NFR BLD AUTO: 0.5 % (ref 0.3–6.2)
ERYTHROCYTE [DISTWIDTH] IN BLOOD BY AUTOMATED COUNT: 12.6 % (ref 12.3–15.4)
FLUAV RNA RESP QL NAA+PROBE: NOT DETECTED
FLUBV RNA RESP QL NAA+PROBE: NOT DETECTED
GEN 5 2HR TROPONIN T REFLEX: <6 NG/L
GLOBULIN UR ELPH-MCNC: 2.8 GM/DL
GLUCOSE SERPL-MCNC: 98 MG/DL (ref 65–99)
HCT VFR BLD AUTO: 41.2 % (ref 34–46.6)
HGB BLD-MCNC: 14.2 G/DL (ref 12–15.9)
HOLD SPECIMEN: NORMAL
HOLD SPECIMEN: NORMAL
IMM GRANULOCYTES # BLD AUTO: 0.02 10*3/MM3 (ref 0–0.05)
IMM GRANULOCYTES NFR BLD AUTO: 0.3 % (ref 0–0.5)
LYMPHOCYTES # BLD AUTO: 1.42 10*3/MM3 (ref 0.7–3.1)
LYMPHOCYTES NFR BLD AUTO: 24.5 % (ref 19.6–45.3)
MCH RBC QN AUTO: 30 PG (ref 26.6–33)
MCHC RBC AUTO-ENTMCNC: 34.5 G/DL (ref 31.5–35.7)
MCV RBC AUTO: 87.1 FL (ref 79–97)
MONOCYTES # BLD AUTO: 0.43 10*3/MM3 (ref 0.1–0.9)
MONOCYTES NFR BLD AUTO: 7.4 % (ref 5–12)
NEUTROPHILS NFR BLD AUTO: 3.87 10*3/MM3 (ref 1.7–7)
NEUTROPHILS NFR BLD AUTO: 66.8 % (ref 42.7–76)
NRBC BLD AUTO-RTO: 0 /100 WBC (ref 0–0.2)
PLATELET # BLD AUTO: 290 10*3/MM3 (ref 140–450)
PMV BLD AUTO: 10.9 FL (ref 6–12)
POTASSIUM SERPL-SCNC: 4 MMOL/L (ref 3.5–5.2)
PROT SERPL-MCNC: 7.5 G/DL (ref 6–8.5)
RBC # BLD AUTO: 4.73 10*6/MM3 (ref 3.77–5.28)
SARS-COV-2 RNA RESP QL NAA+PROBE: NOT DETECTED
SODIUM SERPL-SCNC: 139 MMOL/L (ref 136–145)
T4 FREE SERPL-MCNC: 1.29 NG/DL (ref 0.93–1.7)
TROPONIN T DELTA: NORMAL
TROPONIN T SERPL HS-MCNC: <6 NG/L
TSH SERPL DL<=0.05 MIU/L-ACNC: 1.09 UIU/ML (ref 0.27–4.2)
WBC NRBC COR # BLD AUTO: 5.8 10*3/MM3 (ref 3.4–10.8)
WHOLE BLOOD HOLD COAG: NORMAL
WHOLE BLOOD HOLD SPECIMEN: NORMAL

## 2024-02-09 PROCEDURE — 25010000002 ONDANSETRON PER 1 MG: Performed by: NURSE PRACTITIONER

## 2024-02-09 PROCEDURE — 80050 GENERAL HEALTH PANEL: CPT

## 2024-02-09 PROCEDURE — 71045 X-RAY EXAM CHEST 1 VIEW: CPT

## 2024-02-09 PROCEDURE — 84439 ASSAY OF FREE THYROXINE: CPT | Performed by: NURSE PRACTITIONER

## 2024-02-09 PROCEDURE — 85379 FIBRIN DEGRADATION QUANT: CPT | Performed by: NURSE PRACTITIONER

## 2024-02-09 PROCEDURE — 84484 ASSAY OF TROPONIN QUANT: CPT

## 2024-02-09 PROCEDURE — 87636 SARSCOV2 & INF A&B AMP PRB: CPT | Performed by: NURSE PRACTITIONER

## 2024-02-09 PROCEDURE — 36415 COLL VENOUS BLD VENIPUNCTURE: CPT

## 2024-02-09 PROCEDURE — 93005 ELECTROCARDIOGRAM TRACING: CPT

## 2024-02-09 PROCEDURE — 99284 EMERGENCY DEPT VISIT MOD MDM: CPT

## 2024-02-09 PROCEDURE — 96376 TX/PRO/DX INJ SAME DRUG ADON: CPT

## 2024-02-09 PROCEDURE — 96374 THER/PROPH/DIAG INJ IV PUSH: CPT

## 2024-02-09 PROCEDURE — 93010 ELECTROCARDIOGRAM REPORT: CPT | Performed by: EMERGENCY MEDICINE

## 2024-02-09 PROCEDURE — 84481 FREE ASSAY (FT-3): CPT | Performed by: NURSE PRACTITIONER

## 2024-02-09 RX ORDER — ASPIRIN 81 MG/1
324 TABLET, CHEWABLE ORAL ONCE
Status: COMPLETED | OUTPATIENT
Start: 2024-02-09 | End: 2024-02-09

## 2024-02-09 RX ORDER — SODIUM CHLORIDE 0.9 % (FLUSH) 0.9 %
10 SYRINGE (ML) INJECTION AS NEEDED
Status: DISCONTINUED | OUTPATIENT
Start: 2024-02-09 | End: 2024-02-09 | Stop reason: HOSPADM

## 2024-02-09 RX ORDER — ONDANSETRON 2 MG/ML
4 INJECTION INTRAMUSCULAR; INTRAVENOUS ONCE
Status: COMPLETED | OUTPATIENT
Start: 2024-02-09 | End: 2024-02-09

## 2024-02-09 RX ADMIN — ONDANSETRON 4 MG: 2 INJECTION INTRAMUSCULAR; INTRAVENOUS at 16:07

## 2024-02-09 RX ADMIN — ONDANSETRON 4 MG: 2 INJECTION INTRAMUSCULAR; INTRAVENOUS at 17:12

## 2024-02-09 RX ADMIN — ASPIRIN 324 MG: 81 TABLET, CHEWABLE ORAL at 15:08

## 2024-02-09 RX ADMIN — ONDANSETRON 4 MG: 2 INJECTION INTRAMUSCULAR; INTRAVENOUS at 15:18

## 2024-02-09 NOTE — Clinical Note
[facility] EMERGENCY DEPARTMENT  [address]  [facility] [address]  Phone: [phone]    Mago Gamboa was seen and treated in our emergency department on 2/9/2024.  She may return to work on 02/14/2024.         Thank you for choosing [facility].    Amelia Freedman, APRN

## 2024-02-09 NOTE — ED PROVIDER NOTES
"Subjective   History of Present Illness  Patient is a 38-year-old female who presents to the ER with chief complaints of chest pain.  Patient reports history of tachycardia.  She states that she has had a Holter monitor in the past and ultimately had a loop recorder for few years that did find bouts of SVT.  Patient states the loop recorder was removed by Dr. Fall March 2023.  She states today she started having chest discomfort described as burning sensation and reports palpitations.  She states she began experiencing discomfort to her left arm and left jaw.  She also reports having nausea however indicates that she has chronic nausea.  She states since 2020 when she was diagnosed with COVID she has had, \"GI issues causing nausea.\"  Patient states that she took 0.25 mg of Xanax this morning due to the palpitations.  She states sometimes this helps with the tachycardia because of her anxiety kicks and it will cause worsening tachycardia.  Patient denies any cough or congestion.  She denies any recorded fevers.  She reports slight shortness of breath with activity. She denies taking any new medications.  Past medical history significant for anxiety, arrhythmia, asthma, cervical dystonia, gastroparesis, GERD, globus sensation, intractable nausea and vomiting, asthma, tachycardia, vocal cord dysfunction, patient reports SVT        Review of Systems   Constitutional: Negative.  Negative for fatigue and fever.   HENT: Negative.  Negative for congestion.    Eyes: Negative.    Respiratory:  Positive for shortness of breath. Negative for cough.    Cardiovascular:  Positive for chest pain and palpitations.   Gastrointestinal:  Positive for nausea. Negative for abdominal pain, constipation, diarrhea and vomiting.   Genitourinary: Negative.  Negative for dysuria.   Musculoskeletal: Negative.  Negative for back pain.   Skin: Negative.    All other systems reviewed and are negative.      Past Medical History:   Diagnosis Date "    Abnormal ECG     Allergic 2005    Anxiety 2010    Arrhythmia     Asthma, extrinsic     Cervical dystonia 11/30/2022    Gastroparesis 12/23/2021    GERD (gastroesophageal reflux disease)     Globus sensation 6/15/2021    Headache 2002    Intractable nausea and vomiting 6/15/2021    Mild intermittent asthma without complication 6/15/2021    Tachycardia     Vocal cord dysfunction 5/4/2020       Allergies   Allergen Reactions    Iodinated Contrast Media Anaphylaxis    Nortriptyline Hcl Rash    Prochlorperazine Palpitations and Shortness Of Breath    Sulfa Antibiotics Angioedema, Other (See Comments), Rash and Unknown - Low Severity     breathing difficulty    Other reaction(s): Angioedema, Other (See Comments)       Past Surgical History:   Procedure Laterality Date    ADENOIDECTOMY      APPENDECTOMY      CARDIAC SURGERY      loop recorder    CHOLECYSTECTOMY      COLONOSCOPY  2015    ENDOMETRIAL ABLATION      ENDOSCOPY N/A 7/26/2021    Procedure: ESOPHAGOGASTRODUODENOSCOPY WITH ANESTHESIA;  Surgeon: Keegan Pope DO;  Location: [facility] ENDOSCOPY;  Service: Gastroenterology;  Laterality: N/A;  pre GERD  post  jesus varela APRJOSE ALBERTO    HYSTERECTOMY      SUBTOTAL HYSTERECTOMY  2016    TONSILLECTOMY         Family History   Adopted: Yes   Problem Relation Age of Onset    No Known Problems Mother     No Known Problems Father     COPD Maternal Grandmother     Heart disease Maternal Grandmother     Cancer Maternal Grandfather     Stroke Maternal Grandfather     Colon cancer Neg Hx     Esophageal cancer Neg Hx     Breast cancer Neg Hx        Social History     Socioeconomic History    Marital status:    Tobacco Use    Smoking status: Never    Smokeless tobacco: Never   Vaping Use    Vaping Use: Never used   Substance and Sexual Activity    Alcohol use: Not Currently     Alcohol/week: 0.0 standard drinks of alcohol    Drug use: Never    Sexual activity: Yes     Partners: Male     Birth control/protection: Surgical  "          Objective   Physical Exam  Vitals and nursing note reviewed.   Constitutional:       Appearance: She is well-developed.   HENT:      Head: Normocephalic and atraumatic.      Nose: Nose normal.   Eyes:      Extraocular Movements: Extraocular movements intact.      Conjunctiva/sclera: Conjunctivae normal.      Pupils: Pupils are equal, round, and reactive to light.   Cardiovascular:      Rate and Rhythm: Regular rhythm. Tachycardia present.      Heart sounds: Normal heart sounds.   Pulmonary:      Effort: Pulmonary effort is normal.      Breath sounds: Normal breath sounds.   Abdominal:      General: Bowel sounds are normal.      Palpations: Abdomen is soft.   Musculoskeletal:         General: Normal range of motion.      Cervical back: Normal range of motion and neck supple.   Skin:     General: Skin is warm and dry.      Capillary Refill: Capillary refill takes less than 2 seconds.   Neurological:      General: No focal deficit present.      Mental Status: She is alert and oriented to person, place, and time.   Psychiatric:         Mood and Affect: Mood normal.         Behavior: Behavior normal.         Procedures           ED Course                HEART Score: 1                              Medical Decision Making  Patient is a 38-year-old female who presents to the ER with chief complaints of chest pain.  Patient reports history of tachycardia.  She states that she has had a Holter monitor in the past and ultimately had a loop recorder for few years that did find bouts of SVT.  Patient states the loop recorder was removed by Dr. Fall March 2023.  She states today she started having chest discomfort described as burning sensation and reports palpitations.  She states she began experiencing discomfort to her left arm and left jaw.  She also reports having nausea however indicates that she has chronic nausea.  She states since 2020 when she was diagnosed with COVID she has had, \"GI issues causing nausea.\"  " Patient states that she took 0.25 mg of Xanax this morning due to the palpitations.  She states sometimes this helps with the tachycardia because of her anxiety kicks and it will cause worsening tachycardia.  Patient denies any cough or congestion.  She denies any recorded fevers.  She denies taking any new medications.  She reports feeling slight shortness of breath with activity. Past medical history significant for anxiety, arrhythmia, asthma, cervical dystonia, gastroparesis, GERD, globus sensation, intractable nausea and vomiting, asthma, tachycardia, vocal cord dysfunction, patient reports SVT  Differential diagnosis: Arrhythmia, ACS, anxiety, PE, and other    HEART Score for Major Cardiac Events - MDCalc  Calculated on Feb 09 2024 5:46 PM  1 points -> Low Score (0-3 points) Risk of MACE of 0.9-1.7%.    Labs Reviewed  TROPONIN - Normal         Narrative: High Sensitive Troponin T Reference Range:                  <14.0 ng/L- Negative Female for AMI                  <22.0 ng/L- Negative Male for AMI                  >=14 - Abnormal Female indicating possible myocardial injury.                  >=22 - Abnormal Male indicating possible myocardial injury.                   Clinicians would have to utilize clinical acumen, EKG, Troponin, and serial changes to determine if it is an Acute Myocardial Infarction or myocardial injury due to an underlying chronic condition.                                       CBC WITH AUTO DIFFERENTIAL - Normal  TSH - Normal  T4, FREE - Normal         Narrative: Results may be falsely increased if patient taking Biotin.                    D-DIMER, QUANTITATIVE - Normal         Narrative: According to the assay 's published package insert, a normal (<0.50 MCGFEU/mL) D-dimer result in conjunction with a non-high clinical probability assessment, excludes deep vein thrombosis (DVT) and pulmonary embolism (PE) with high sensitivity.                                    D-dimer  "values increase with age and this can make VTE exclusion of an older population difficult. To address this, the American College of [employer], based on best available evidence and recent guidelines, recommends that clinicians use age-adjusted D-dimer thresholds in patients greater than 50 years of age with: a) a low probability of PE who do not meet all Pulmonary Embolism Rule Out Criteria, or b) in those with intermediate probability of PE.                   The formula for an age-adjusted D-dimer cut-off is \"age/100\".                  For example, a 60 year old patient would have an age-adjusted cut-off of 0.60 MCGFEU/mL and an 80 year old 0.80 MCGFEU/mL.  COVID-19 AND FLU A/B, NP SWAB IN TRANSPORT MEDIA 1 HR TAT  RAINBOW DRAW         Narrative: The following orders were created for panel order [place] Draw.                  Procedure                               Abnormality         Status                                     ---------                               -----------         ------                                     Green Top (Gel)[[id number]]                                  Final result                               Lavender Top[[id number]]                                     Final result                               Red Top[[id number]]                                          Final result                               Light Blue Top[[id number]]                                   Final result                                                 Please view results for these tests on the individual orders.  COMPREHENSIVE METABOLIC PANEL         Narrative: GFR Normal >60                  Chronic Kidney Disease <60                  Kidney Failure <15                    HIGH SENSITIVITIY TROPONIN T 2HR         Narrative: High Sensitive Troponin T Reference Range:                  <14.0 ng/L- Negative Female for AMI                  <22.0 ng/L- Negative Male for AMI                  >=14 - Abnormal Female " indicating possible myocardial injury.                  >=22 - Abnormal Male indicating possible myocardial injury.                   Clinicians would have to utilize clinical acumen, EKG, Troponin, and serial changes to determine if it is an Acute Myocardial Infarction or myocardial injury due to an underlying chronic condition.                                       T3, FREE  CBC AND DIFFERENTIAL         Narrative: The following orders were created for panel order CBC & Differential.                  Procedure                               Abnormality         Status                                     ---------                               -----------         ------                                     CBC Auto Differential[[id number]]        Normal              Final result                                                 Please view results for these tests on the individual orders.  GREEN TOP  LAVENDER TOP  RED TOP  LIGHT BLUE TOP     XR Chest 1 View   Final Result    1. No acute cardiopulmonary findings.         This report was signed and finalized on 2/9/2024 3:29 PM by Cristhian Sandra.       Lab workup is unremarkable.  She had 2 negative troponins, normal D-dimer, and normal thyroid panel.  EKGs were unremarkable.  She had what appeared to be sinus tachycardia at times in the emergency department.  She has had episodes of nausea with vomiting.  On reexam she has had nausea and tells me that she began experiencing bodyaches and feeling poorly 2 to 3 days ago.  We now question if she has a viral illness.  We have ordered a flu/COVID swab however she would like to be discharged and will check her MyChart for results.  We recommend follow-up with PCP for reevaluation.  We discussed the possibility of outpatient Holter monitor given the fact that she has not had recordings in quite some time.  She reports tachycardia more frequently since the loop recorder was removed.  She states there was discussion with her  cardiologist about possibility of medication if she started experiencing tachycardia again.  We will have patient follow-up with her PCP and at their discretion order Holter monitor since there is a possibility she has a viral illness and she prefers to have the monitor recorded when she is feeling better.  Patient will be discharged home shortly in stable condition.    Patient's heart score is a 1    Problems Addressed:  Chest pain, unspecified type: acute illness or injury  Viral illness: acute illness or injury    Amount and/or Complexity of Data Reviewed  Labs: ordered. Decision-making details documented in ED Course.  Radiology: ordered. Decision-making details documented in ED Course.  ECG/medicine tests: ordered. Decision-making details documented in ED Course.    Risk  Prescription drug management.        Final diagnoses:   Chest pain, unspecified type   Viral illness   Palpitations       ED Disposition  ED Disposition       ED Disposition   Discharge    Condition   Good    Comment   --               No follow-up provider specified.       Medication List      No changes were made to your prescriptions during this visit.            Amelia Freedman, APRN  02/09/24 1028

## 2024-02-09 NOTE — Clinical Note
[facility] EMERGENCY DEPARTMENT  [address]  [facility] [address]  Phone: [phone]    Mago Gamboa was seen and treated in our emergency department on 2/9/2024.  She may return to work on 02/14/2024.         Thank you for choosing [facility].    Amelia Freedman, APRN

## 2024-02-09 NOTE — DISCHARGE INSTRUCTIONS
Push fluids; maintain fever control; f/u with pcp for re-evaluation  We also recommend outpatient holter monitor which may be ordered by your PCP at their discretion

## 2024-02-10 LAB — T3FREE SERPL-MCNC: 3.49 PG/ML (ref 2–4.4)

## 2024-02-11 LAB
QT INTERVAL: 312 MS
QT INTERVAL: 334 MS
QTC INTERVAL: 430 MS
QTC INTERVAL: 452 MS

## 2024-02-12 ENCOUNTER — OFFICE VISIT (OUTPATIENT)
Dept: INTERNAL MEDICINE | Facility: CLINIC | Age: 39
End: 2024-02-12
Payer: COMMERCIAL

## 2024-02-12 ENCOUNTER — TELEPHONE (OUTPATIENT)
Dept: CARDIOLOGY | Facility: CLINIC | Age: 39
End: 2024-02-12

## 2024-02-12 VITALS
WEIGHT: 125.8 LBS | DIASTOLIC BLOOD PRESSURE: 90 MMHG | HEIGHT: 66 IN | HEART RATE: 140 BPM | RESPIRATION RATE: 16 BRPM | OXYGEN SATURATION: 98 % | SYSTOLIC BLOOD PRESSURE: 110 MMHG | TEMPERATURE: 98.7 F | BODY MASS INDEX: 20.22 KG/M2

## 2024-02-12 DIAGNOSIS — R00.2 PALPITATIONS: ICD-10-CM

## 2024-02-12 DIAGNOSIS — R00.0 TACHYCARDIA: Primary | Chronic | ICD-10-CM

## 2024-02-12 DIAGNOSIS — R35.0 URINARY FREQUENCY: ICD-10-CM

## 2024-02-12 DIAGNOSIS — E86.0 DEHYDRATION: ICD-10-CM

## 2024-02-12 DIAGNOSIS — K31.84 GASTROPARESIS: Chronic | ICD-10-CM

## 2024-02-12 LAB
BILIRUB BLD-MCNC: ABNORMAL MG/DL
CLARITY, POC: CLEAR
COLOR UR: ABNORMAL
GLUCOSE UR STRIP-MCNC: NEGATIVE MG/DL
KETONES UR QL: ABNORMAL
LEUKOCYTE EST, POC: NEGATIVE
NITRITE UR-MCNC: NEGATIVE MG/ML
PH UR: 5.5 [PH] (ref 5–8)
PROT UR STRIP-MCNC: ABNORMAL MG/DL
RBC # UR STRIP: NEGATIVE /UL
SP GR UR: 1.03 (ref 1–1.03)
UROBILINOGEN UR QL: ABNORMAL

## 2024-02-12 RX ORDER — METOCLOPRAMIDE 5 MG/1
5 TABLET ORAL
Qty: 60 TABLET | Refills: 0 | Status: SHIPPED | OUTPATIENT
Start: 2024-02-12

## 2024-02-12 RX ORDER — SODIUM CHLORIDE 9 MG/ML
500 INJECTION, SOLUTION INTRAVENOUS ONCE
Status: COMPLETED | OUTPATIENT
Start: 2024-02-12 | End: 2024-02-12

## 2024-02-12 RX ADMIN — SODIUM CHLORIDE 500 ML/HR: 9 INJECTION, SOLUTION INTRAVENOUS at 16:15

## 2024-02-12 NOTE — TELEPHONE ENCOUNTER
"Caller: Mago Gamboa \"Shelly\"    Relationship to patient: Self    Best call back number: [phone]     Chief complaint: PT WAS HOSPITAL WITH BURNING SENSATION IN HER CHEST. HER EKG MENTION A POSSIBLE INFARCT AT UNKNOWN AGE    Type of visit: FOLLOW UP     Requested date:  ASAP    If rescheduling, when is the original appointment: N/A     Additional notes: PT IS FLEXIBLE ON TIMES AND DAYS         "

## 2024-02-14 LAB
ALBUMIN SERPL-MCNC: 4.3 G/DL (ref 3.9–4.9)
ALBUMIN/GLOB SERPL: 2.2 {RATIO} (ref 1.2–2.2)
ALP SERPL-CCNC: 51 IU/L (ref 44–121)
ALT SERPL-CCNC: 8 IU/L (ref 0–32)
AST SERPL-CCNC: 9 IU/L (ref 0–40)
BILIRUB SERPL-MCNC: 0.9 MG/DL (ref 0–1.2)
BUN SERPL-MCNC: 11 MG/DL (ref 6–24)
BUN/CREAT SERPL: 15 (ref 9–23)
CALCIUM SERPL-MCNC: 8.7 MG/DL (ref 8.7–10.2)
CHLORIDE SERPL-SCNC: 103 MMOL/L (ref 96–106)
CO2 SERPL-SCNC: 17 MMOL/L (ref 20–29)
CREAT SERPL-MCNC: 0.74 MG/DL (ref 0.57–1)
EGFRCR SERPLBLD CKD-EPI 2021: 105 ML/MIN/1.73
GLOBULIN SER CALC-MCNC: 2 G/DL (ref 1.5–4.5)
GLUCOSE SERPL-MCNC: 67 MG/DL (ref 70–99)
Lab: NORMAL
POTASSIUM SERPL-SCNC: 4.4 MMOL/L (ref 3.5–5.2)
PROT SERPL-MCNC: 6.3 G/DL (ref 6–8.5)
SODIUM SERPL-SCNC: 138 MMOL/L (ref 134–144)

## 2024-02-14 NOTE — TELEPHONE ENCOUNTER
Roel Fall MD  You42 minutes ago (10:33 AM)       Yes - I saw that one too.  Definitely no evidence on it of prior infarct.

## 2024-02-14 NOTE — TELEPHONE ENCOUNTER
Notified patient. She had another EKG at [facility] in [place]. I told her if you see anything alarming I will let her know. CORINE Ramos Martin G, MD  You15 hours ago (4:42 PM)       Both of those  reads are incorrect - there is no evidence of a prior infarct.    Advise PCP f/u

## 2024-04-16 ENCOUNTER — HOSPITAL ENCOUNTER (OUTPATIENT)
Dept: GENERAL RADIOLOGY | Facility: HOSPITAL | Age: 39
Discharge: HOME | End: 2024-04-16
Admitting: NURSE PRACTITIONER
Payer: COMMERCIAL

## 2024-04-16 DIAGNOSIS — R06.02 SHORTNESS OF BREATH: Primary | ICD-10-CM

## 2024-04-16 DIAGNOSIS — R06.02 SHORTNESS OF BREATH: ICD-10-CM

## 2024-04-16 PROCEDURE — 71046 X-RAY EXAM CHEST 2 VIEWS: CPT

## 2024-04-25 ENCOUNTER — OFFICE VISIT (OUTPATIENT)
Dept: INTERNAL MEDICINE | Facility: CLINIC | Age: 39
End: 2024-04-25
Payer: COMMERCIAL

## 2024-04-25 VITALS
HEART RATE: 92 BPM | SYSTOLIC BLOOD PRESSURE: 115 MMHG | RESPIRATION RATE: 18 BRPM | HEIGHT: 66 IN | TEMPERATURE: 98.2 F | BODY MASS INDEX: 20.73 KG/M2 | DIASTOLIC BLOOD PRESSURE: 76 MMHG | WEIGHT: 129 LBS | OXYGEN SATURATION: 99 %

## 2024-04-25 DIAGNOSIS — F41.9 ANXIETY: ICD-10-CM

## 2024-04-25 DIAGNOSIS — K21.9 GASTROESOPHAGEAL REFLUX DISEASE, UNSPECIFIED WHETHER ESOPHAGITIS PRESENT: ICD-10-CM

## 2024-04-25 DIAGNOSIS — Z79.899 ENCOUNTER FOR LONG-TERM (CURRENT) USE OF OTHER MEDICATIONS: Primary | ICD-10-CM

## 2024-04-25 RX ORDER — ALPRAZOLAM 0.5 MG/1
0.5 TABLET ORAL 2 TIMES DAILY PRN
Qty: 60 TABLET | Refills: 0 | Status: SHIPPED | OUTPATIENT
Start: 2024-04-25
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
## 2024-04-28 LAB
AMPHETAMINES UR QL SCN: NEGATIVE NG/ML
BARBITURATES UR QL SCN: NEGATIVE NG/ML
BENZODIAZ UR QL SCN: NEGATIVE NG/ML
BZE UR QL SCN: NEGATIVE NG/ML
CANNABINOIDS UR QL SCN: NEGATIVE NG/ML
CREAT UR-MCNC: 17.4 MG/DL (ref 20–300)
LABORATORY COMMENT REPORT: ABNORMAL
METHADONE UR QL SCN: NEGATIVE NG/ML
OPIATES UR QL SCN: NEGATIVE NG/ML
OXYCODONE+OXYMORPHONE UR QL SCN: NEGATIVE NG/ML
PCP UR QL: NEGATIVE NG/ML
PH UR: 5.7 [PH] (ref 4.5–8.9)
PROPOXYPH UR QL SCN: NEGATIVE NG/ML
SP GR UR: 1

## 2024-07-18 ENCOUNTER — OFFICE VISIT (OUTPATIENT)
Dept: INTERNAL MEDICINE | Facility: CLINIC | Age: 39
End: 2024-07-18
Payer: COMMERCIAL

## 2024-07-18 VITALS
WEIGHT: 119.6 LBS | DIASTOLIC BLOOD PRESSURE: 84 MMHG | OXYGEN SATURATION: 99 % | HEIGHT: 66 IN | TEMPERATURE: 98.4 F | HEART RATE: 96 BPM | SYSTOLIC BLOOD PRESSURE: 127 MMHG | RESPIRATION RATE: 16 BRPM | BODY MASS INDEX: 19.22 KG/M2

## 2024-07-18 DIAGNOSIS — F41.9 ANXIETY: ICD-10-CM

## 2024-07-18 DIAGNOSIS — N63.20 BILATERAL BREAST LUMP: Primary | ICD-10-CM

## 2024-07-18 DIAGNOSIS — N63.10 BILATERAL BREAST LUMP: Primary | ICD-10-CM

## 2024-07-18 PROCEDURE — 99213 OFFICE O/P EST LOW 20 MIN: CPT | Performed by: NURSE PRACTITIONER

## 2024-07-18 NOTE — PROGRESS NOTES
Subjective     Chief Complaint   Patient presents with    Anxiety    Breast Mass       History of Present Illness  The patient is a 37-year-old female who presents for her 3-month follow-up.    She is seeking a refill of her Xanax prescription, as she is under significant stress. After eating on Saturday night, she experienced vomiting in the middle of the night on Sunday morning, leading to a loss of appetite for the entire Sunday and Monday. On Tuesday, she consumed grilled cheese and mashed potatoes, and Wednesday, she consumed a half piece of chicken. Today, she consumed lunch and two chicken nuggets for supper.    She has numerous bilateral waxing and waning breast cysts, with the right breast being the size of a half dollar. She does not report any breast implants. Her next mammogram is scheduled for 10/2024. The mass on the left breast is more concerning to her because of the tenderness. The right mass is not tender. No nipple discharge.     Otherwise complete ROS reviewed and negative except as mentioned in the HPI.    Past Medical History:   Past Medical History:   Diagnosis Date    Abnormal ECG     Allergic 2005    Anxiety 2010    Arrhythmia     Asthma, extrinsic     Cervical dystonia 11/30/2022    Gastroparesis 12/23/2021    GERD (gastroesophageal reflux disease)     Globus sensation 6/15/2021    Headache 2002    Intractable nausea and vomiting 6/15/2021    Mild intermittent asthma without complication 6/15/2021    Tachycardia     Vocal cord dysfunction 5/4/2020     Past Surgical History:  Past Surgical History:   Procedure Laterality Date    ADENOIDECTOMY      APPENDECTOMY      CARDIAC SURGERY      loop recorder    CHOLECYSTECTOMY      COLONOSCOPY  2015    ENDOMETRIAL ABLATION      ENDOSCOPY N/A 7/26/2021    Procedure: ESOPHAGOGASTRODUODENOSCOPY WITH ANESTHESIA;  Surgeon: Keegan Pope DO;  Location: [facility] ENDOSCOPY;  Service: Gastroenterology;  Laterality: N/A;  pre GERD  post  jesus  avery BLOCK    HYSTERECTOMY      SUBTOTAL HYSTERECTOMY  2016    TONSILLECTOMY       Social History:  reports that she has never smoked. She has never been exposed to tobacco smoke. She has never used smokeless tobacco. She reports that she does not currently use alcohol. She reports that she does not use drugs.    Family History: family history includes COPD in her maternal grandmother; Cancer in her maternal grandfather; Heart disease in her maternal grandmother; No Known Problems in her father and mother; Stroke in her maternal grandfather. She was adopted.       Allergies:  Allergies   Allergen Reactions    Iodinated Contrast Media Anaphylaxis    Nortriptyline Hcl Rash    Prochlorperazine Palpitations and Shortness Of Breath    Sulfa Antibiotics Angioedema, Other (See Comments), Rash and Unknown - Low Severity     breathing difficulty    Other reaction(s): Angioedema, Other (See Comments)     Medications:  Prior to Admission medications    Medication Sig Start Date End Date Taking? Authorizing Provider   albuterol (PROVENTIL) (2.5 MG/3ML) 0.083% nebulizer solution INHALE 3 ML PO Q 4 - 6 H PRM    ProviderAlbaro MD   albuterol sulfate  (90 Base) MCG/ACT inhaler Inhale 2 puffs Every 4 (Four) Hours As Needed for Wheezing. 9/3/21   Jesusita Patterson APRN   ALPRAZolam (XANAX) 0.5 MG tablet Take 1 tablet by mouth 2 (Two) Times a Day As Needed for Anxiety. for anxiety 4/25/24   Jesusita Patterson APRN   Ascorbic Acid (DEANN-C PO) Take  by mouth.    Albaro Kenyon MD   Cholecalciferol (Vitamin D3) 50 MCG (2000 UT) capsule Take 1 capsule by mouth Daily.    Albaro Kenyon MD   cholestyramine light 4 g powder Take 1 packet by mouth 2 (Two) Times a Day. 12/8/23   Jesusita Patterson APRN   cyclobenzaprine (FLEXERIL) 5 MG tablet cyclobenzaprine 5 mg tablet   Take 1 tablet as needed by oral route at bedtime for 30 days.    Albaro Kenyon MD   dexlansoprazole (Dexilant) 60 MG  "capsule Take 1 capsule by mouth Daily.    Provider, MD Albaro   esomeprazole (nexIUM) 40 MG capsule Take 1 capsule by mouth Daily.    Provider, MD Albaro   loratadine (CLARITIN) 10 MG tablet Daily.    Provider, MD Albaro   metoclopramide (Reglan) 5 MG tablet Take 1 tablet by mouth 4 (Four) Times a Day Before Meals & at Bedtime. 2/12/24   Joey Borja APRN   Multiple Vitamins-Minerals (ZINC PO) Take  by mouth.    Provider, MD Albaro   mupirocin (BACTROBAN) 2 % ointment Apply 1 application topically to the appropriate area as directed 3 (Three) Times a Day. 12/29/21   Leonarda Schafer DO   ondansetron ODT (ZOFRAN-ODT) 8 MG disintegrating tablet Place 1 tablet on the tongue Every 8 (Eight) Hours As Needed for Nausea or Vomiting. 11/20/23   Jesusita Patterson APRN   sertraline (Zoloft) 25 MG tablet Take 0.5 tablets by mouth Daily. 9/28/23   Jesusita Patterson APRN   triamcinolone (KENALOG) 0.1 % ointment Apply 1 application  topically to the appropriate area as directed 2 (Two) Times a Day. 8/4/23   Jesusita Patterson APRN       Objective     Vital Signs: /84 (BP Location: Left arm, Patient Position: Sitting, Cuff Size: Adult)   Pulse 96   Temp 98.4 °F (36.9 °C) (Infrared)   Resp 16   Ht 167.6 cm (65.98\")   Wt 54.3 kg (119 lb 9.6 oz)   SpO2 99%   BMI 19.31 kg/m²     Physical Exam      Physical Exam  Vitals reviewed.   Constitutional:       Appearance: Normal appearance. She is well-developed.   HENT:      Head: Normocephalic and atraumatic.   Eyes:      Pupils: Pupils are equal, round, and reactive to light.   Neck:      Vascular: No JVD.   Cardiovascular:      Rate and Rhythm: Normal rate and regular rhythm.   Pulmonary:      Effort: Pulmonary effort is normal.   Chest:          Comments: Large masses upper outer quadrant on bilateral breasts. Right greater than left.    Abdominal:      General: Bowel sounds are normal.      Palpations: Abdomen is soft. "   Musculoskeletal:         General: No deformity.      Cervical back: Normal range of motion and neck supple.   Lymphadenopathy:      Cervical: No cervical adenopathy.   Skin:     General: Skin is warm and dry.   Neurological:      Mental Status: She is alert and oriented to person, place, and time.   Psychiatric:         Behavior: Behavior normal.         Thought Content: Thought content normal.         Judgment: Judgment normal.         BMI is within normal parameters. No other follow-up for BMI required.      Results Reviewed:  Glucose   Date Value Ref Range Status   02/12/2024 67 (L) 70 - 99 mg/dL Final   02/09/2024 98 65 - 99 mg/dL Final     BUN   Date Value Ref Range Status   02/12/2024 11 6 - 24 mg/dL Final   02/09/2024 8 6 - 20 mg/dL Final     Creatinine   Date Value Ref Range Status   02/12/2024 0.74 0.57 - 1.00 mg/dL Final   02/09/2024 0.70 0.57 - 1.00 mg/dL Final     Sodium   Date Value Ref Range Status   02/12/2024 138 134 - 144 mmol/L Final   02/09/2024 139 136 - 145 mmol/L Final     Potassium   Date Value Ref Range Status   02/12/2024 4.4 3.5 - 5.2 mmol/L Final   02/09/2024 4.0 3.5 - 5.2 mmol/L Final     Chloride   Date Value Ref Range Status   02/12/2024 103 96 - 106 mmol/L Final   02/09/2024 102 98 - 107 mmol/L Final     CO2   Date Value Ref Range Status   02/09/2024 24.0 22.0 - 29.0 mmol/L Final     Total CO2   Date Value Ref Range Status   02/12/2024 17 (L) 20 - 29 mmol/L Final     Calcium   Date Value Ref Range Status   02/12/2024 8.7 8.7 - 10.2 mg/dL Final   02/09/2024 9.4 8.6 - 10.5 mg/dL Final     ALT (SGPT)   Date Value Ref Range Status   02/12/2024 8 0 - 32 IU/L Final   02/09/2024 10 1 - 33 U/L Final     AST (SGOT)   Date Value Ref Range Status   02/12/2024 9 0 - 40 IU/L Final   02/09/2024 11 1 - 32 U/L Final     WBC   Date Value Ref Range Status   02/09/2024 5.80 3.40 - 10.80 10*3/mm3 Final   05/09/2023 5.1 3.4 - 10.8 x10E3/uL Final   05/24/2018 5.3 4.0 - 10.5 10*3/uL Final     Hematocrit    Date Value Ref Range Status   02/09/2024 41.2 34.0 - 46.6 % Final   05/24/2018 40.0 37.0 - 47.0 % Final     Platelets   Date Value Ref Range Status   02/09/2024 290 140 - 450 10*3/mm3 Final   05/24/2018 295 150 - 450 10*3/uL Final     Triglycerides   Date Value Ref Range Status   05/09/2023 53 0 - 149 mg/dL Final     HDL Cholesterol   Date Value Ref Range Status   05/09/2023 56 >39 mg/dL Final     LDL Chol Calc (NIH)   Date Value Ref Range Status   05/09/2023 68 0 - 99 mg/dL Final     Hemoglobin A1C   Date Value Ref Range Status   05/09/2023 5.3 4.8 - 5.6 % Final     Comment:              Prediabetes: 5.7 - 6.4           Diabetes: >6.4           Glycemic control for adults with diabetes: <7.0         Results        Assessment / Plan     Assessment/Plan:  Diagnoses and all orders for this visit:    1. Bilateral breast lump (Primary)    2. Anxiety    I have ordered mammogram and US if needed for her.   Assessment & Plan  1. Three-month follow-up.  A prescription for Xanax, 60 tablets, was provided.    2. Numerous bilateral waxing and waning breast cysts.  A mammogram is scheduled for 10/2024.      No follow-ups on file. unless patient needs to be seen sooner or acute issues arise.    Code Status: Full.   Patient or patient representative verbalized consent for the use of Ambient Listening during the visit with  MCKAYLA Campos for chart documentation. 7/19/2024  17:31 CDT  I have discussed the patient results/orders and and plan/recommendation with them at today's visit.      Signed by:    MCKAYLA Campos Date: 07/19/24

## 2024-07-19 DIAGNOSIS — N63.0 BREAST MASS IN FEMALE: Primary | ICD-10-CM

## 2024-07-25 DIAGNOSIS — N63.0 BREAST MASS IN FEMALE: Primary | ICD-10-CM

## 2024-07-31 ENCOUNTER — HOSPITAL ENCOUNTER (OUTPATIENT)
Dept: ULTRASOUND IMAGING | Facility: HOSPITAL | Age: 39
Discharge: HOME | End: 2024-07-31
Payer: COMMERCIAL

## 2024-07-31 ENCOUNTER — HOSPITAL ENCOUNTER (OUTPATIENT)
Dept: MAMMOGRAPHY | Facility: HOSPITAL | Age: 39
Discharge: HOME | End: 2024-07-31
Payer: COMMERCIAL

## 2024-07-31 PROCEDURE — 76642 ULTRASOUND BREAST LIMITED: CPT

## 2024-07-31 PROCEDURE — G0279 TOMOSYNTHESIS, MAMMO: HCPCS

## 2024-07-31 PROCEDURE — 77066 DX MAMMO INCL CAD BI: CPT

## 2024-08-08 ENCOUNTER — HOSPITAL ENCOUNTER (OUTPATIENT)
Dept: ULTRASOUND IMAGING | Facility: HOSPITAL | Age: 39
Discharge: HOME | End: 2024-08-08
Payer: COMMERCIAL

## 2024-08-08 ENCOUNTER — APPOINTMENT (OUTPATIENT)
Dept: MAMMOGRAPHY | Facility: HOSPITAL | Age: 39
End: 2024-08-08
Payer: COMMERCIAL

## 2024-08-08 DIAGNOSIS — R92.8 ABNORMAL MAMMOGRAM: ICD-10-CM

## 2024-08-08 PROCEDURE — 76942 ECHO GUIDE FOR BIOPSY: CPT

## 2024-08-08 RX ORDER — LIDOCAINE HYDROCHLORIDE 10 MG/ML
10 INJECTION, SOLUTION INFILTRATION; PERINEURAL ONCE
Status: DISPENSED | OUTPATIENT
Start: 2024-08-08

## 2024-08-08 RX ORDER — LIDOCAINE HYDROCHLORIDE AND EPINEPHRINE 10; 10 MG/ML; UG/ML
10 INJECTION, SOLUTION INFILTRATION; PERINEURAL ONCE
Status: DISPENSED | OUTPATIENT
Start: 2024-08-08

## 2024-09-03 ENCOUNTER — PATIENT MESSAGE (OUTPATIENT)
Dept: INTERNAL MEDICINE | Facility: CLINIC | Age: 39
End: 2024-09-03
Payer: COMMERCIAL

## 2024-09-24 ENCOUNTER — HOSPITAL ENCOUNTER (OUTPATIENT)
Dept: GENERAL RADIOLOGY | Facility: HOSPITAL | Age: 39
Discharge: HOME | End: 2024-09-24
Admitting: NURSE PRACTITIONER
Payer: COMMERCIAL

## 2024-09-24 DIAGNOSIS — R06.02 SHORTNESS OF BREATH: ICD-10-CM

## 2024-09-24 DIAGNOSIS — R06.02 SHORTNESS OF BREATH: Primary | ICD-10-CM

## 2024-09-24 DIAGNOSIS — F41.9 ANXIETY: ICD-10-CM

## 2024-09-24 PROCEDURE — 71046 X-RAY EXAM CHEST 2 VIEWS: CPT

## 2024-09-24 RX ORDER — ALPRAZOLAM 0.5 MG
0.5 TABLET ORAL 2 TIMES DAILY PRN
Qty: 60 TABLET | Refills: 0 | Status: SHIPPED | OUTPATIENT
Start: 2024-09-24

## 2024-09-27 DIAGNOSIS — Z00.00 ROUTINE GENERAL MEDICAL EXAMINATION AT A HEALTH CARE FACILITY: Primary | ICD-10-CM

## 2024-09-27 DIAGNOSIS — E55.9 VITAMIN D DEFICIENCY: ICD-10-CM

## 2024-09-30 DIAGNOSIS — R53.83 OTHER FATIGUE: Primary | ICD-10-CM

## 2024-10-01 ENCOUNTER — LAB (OUTPATIENT)
Dept: LAB | Facility: HOSPITAL | Age: 39
End: 2024-10-01
Payer: COMMERCIAL

## 2024-10-01 ENCOUNTER — TRANSCRIBE ORDERS (OUTPATIENT)
Dept: ADMINISTRATIVE | Facility: HOSPITAL | Age: 39
End: 2024-10-01
Payer: COMMERCIAL

## 2024-10-01 DIAGNOSIS — R53.83 TIREDNESS: Primary | ICD-10-CM

## 2024-10-01 DIAGNOSIS — R53.83 OTHER FATIGUE: ICD-10-CM

## 2024-10-01 DIAGNOSIS — E55.9 VITAMIN D DEFICIENCY: ICD-10-CM

## 2024-10-01 DIAGNOSIS — R53.83 TIREDNESS: ICD-10-CM

## 2024-10-01 DIAGNOSIS — Z00.00 ROUTINE GENERAL MEDICAL EXAMINATION AT A HEALTH CARE FACILITY: ICD-10-CM

## 2024-10-01 LAB
ALBUMIN SERPL-MCNC: 4.6 G/DL (ref 3.5–5)
ALBUMIN/GLOB SERPL: 1.6 G/DL (ref 1.1–2.5)
ALP SERPL-CCNC: 55 U/L (ref 24–120)
ALT SERPL W P-5'-P-CCNC: 10 U/L (ref 0–35)
ANION GAP SERPL CALCULATED.3IONS-SCNC: 14 MMOL/L (ref 4–13)
AST SERPL-CCNC: 13 U/L (ref 7–45)
AUTO MIXED CELLS #: 0.5 10*3/MM3 (ref 0.1–2.6)
AUTO MIXED CELLS %: 8.7 % (ref 0.1–24)
BILIRUB SERPL-MCNC: 0.9 MG/DL (ref 0.1–1)
BUN SERPL-MCNC: 6 MG/DL (ref 5–21)
BUN/CREAT SERPL: 8.8
CALCIUM SPEC-SCNC: 9.4 MG/DL (ref 8.6–10.5)
CHLORIDE SERPL-SCNC: 102 MMOL/L (ref 98–110)
CHOLEST SERPL-MCNC: 141 MG/DL (ref 130–200)
CO2 SERPL-SCNC: 23 MMOL/L (ref 24–31)
CREAT SERPL-MCNC: 0.68 MG/DL (ref 0.5–1.4)
EGFRCR SERPLBLD CKD-EPI 2021: 113.1 ML/MIN/1.73
ERYTHROCYTE [DISTWIDTH] IN BLOOD BY AUTOMATED COUNT: 12.5 % (ref 12.3–15.4)
GLOBULIN UR ELPH-MCNC: 2.9 GM/DL
GLUCOSE SERPL-MCNC: 95 MG/DL (ref 70–100)
HBA1C MFR BLD: 5.3 % (ref 4.8–5.9)
HCT VFR BLD AUTO: 42.6 % (ref 34–46.6)
HDLC SERPL-MCNC: 70 MG/DL
HGB BLD-MCNC: 13.9 G/DL (ref 12–15.9)
LDLC SERPL CALC-MCNC: 59 MG/DL (ref 0–99)
LDLC/HDLC SERPL: 0.85 {RATIO}
LYMPHOCYTES # BLD AUTO: 1.7 10*3/MM3 (ref 0.7–3.1)
LYMPHOCYTES NFR BLD AUTO: 27.4 % (ref 19.6–45.3)
MAGNESIUM SERPL-MCNC: 2 MG/DL (ref 1.6–2.6)
MCH RBC QN AUTO: 29.1 PG (ref 26.6–33)
MCHC RBC AUTO-ENTMCNC: 32.6 G/DL (ref 31.5–35.7)
MCV RBC AUTO: 89.1 FL (ref 79–97)
NEUTROPHILS NFR BLD AUTO: 4 10*3/MM3 (ref 1.7–7)
NEUTROPHILS NFR BLD AUTO: 63.9 % (ref 42.7–76)
PLATELET # BLD AUTO: 312 10*3/MM3 (ref 140–450)
PMV BLD AUTO: 10.2 FL (ref 6–12)
POTASSIUM SERPL-SCNC: 4.1 MMOL/L (ref 3.5–5.3)
PROT SERPL-MCNC: 7.5 G/DL (ref 6.3–8.7)
RBC # BLD AUTO: 4.78 10*6/MM3 (ref 3.77–5.28)
SODIUM SERPL-SCNC: 139 MMOL/L (ref 135–145)
TRIGL SERPL-MCNC: 58 MG/DL (ref 0–149)
VLDLC SERPL-MCNC: 12 MG/DL (ref 5–40)
WBC NRBC COR # BLD AUTO: 6.2 10*3/MM3 (ref 3.4–10.8)

## 2024-10-01 PROCEDURE — 80050 GENERAL HEALTH PANEL: CPT

## 2024-10-01 PROCEDURE — 83735 ASSAY OF MAGNESIUM: CPT

## 2024-10-01 PROCEDURE — 83036 HEMOGLOBIN GLYCOSYLATED A1C: CPT

## 2024-10-01 PROCEDURE — 84207 ASSAY OF VITAMIN B-6: CPT

## 2024-10-01 PROCEDURE — 84630 ASSAY OF ZINC: CPT

## 2024-10-01 PROCEDURE — 83540 ASSAY OF IRON: CPT

## 2024-10-01 PROCEDURE — 36415 COLL VENOUS BLD VENIPUNCTURE: CPT

## 2024-10-01 PROCEDURE — 80061 LIPID PANEL: CPT

## 2024-10-01 PROCEDURE — 84425 ASSAY OF VITAMIN B-1: CPT

## 2024-10-01 PROCEDURE — 82728 ASSAY OF FERRITIN: CPT

## 2024-10-01 PROCEDURE — 82306 VITAMIN D 25 HYDROXY: CPT

## 2024-10-01 PROCEDURE — 82607 VITAMIN B-12: CPT

## 2024-10-01 PROCEDURE — 84591 ASSAY OF NOS VITAMIN: CPT

## 2024-10-01 PROCEDURE — 84439 ASSAY OF FREE THYROXINE: CPT

## 2024-10-01 PROCEDURE — 84466 ASSAY OF TRANSFERRIN: CPT

## 2024-10-02 LAB
25(OH)D3 SERPL-MCNC: 42.5 NG/ML (ref 30–100)
FERRITIN SERPL-MCNC: 70.4 NG/ML (ref 13–150)
IRON 24H UR-MRATE: 98 MCG/DL (ref 37–145)
IRON SATN MFR SERPL: 26 % (ref 20–50)
T4 FREE SERPL-MCNC: 1.37 NG/DL (ref 0.92–1.68)
TIBC SERPL-MCNC: 380 MCG/DL (ref 298–536)
TRANSFERRIN SERPL-MCNC: 255 MG/DL (ref 200–360)
TSH SERPL DL<=0.05 MIU/L-ACNC: 1.58 UIU/ML (ref 0.27–4.2)
VIT B12 BLD-MCNC: 347 PG/ML (ref 211–946)

## 2024-10-04 LAB — ZINC BLD-MCNC: 389 UG/DL (ref 440–860)

## 2024-10-06 LAB — VIT B1 BLD-SCNC: 91.9 NMOL/L (ref 66.5–200)

## 2024-10-07 LAB — PYRIDOXAL PHOS SERPL-MCNC: 8.1 UG/L (ref 3.4–65.2)

## 2024-10-08 LAB
NIACIN SERPL-MCNC: <5 NG/ML (ref 0–5)
NICOTINAMIDE SERPL-MCNC: 13.8 NG/ML (ref 5.2–72.1)

## 2024-10-14 ENCOUNTER — OFFICE VISIT (OUTPATIENT)
Dept: INTERNAL MEDICINE | Facility: CLINIC | Age: 39
End: 2024-10-14
Payer: COMMERCIAL

## 2024-10-14 VITALS
WEIGHT: 121 LBS | BODY MASS INDEX: 19.44 KG/M2 | OXYGEN SATURATION: 99 % | DIASTOLIC BLOOD PRESSURE: 81 MMHG | TEMPERATURE: 98.9 F | RESPIRATION RATE: 16 BRPM | HEIGHT: 66 IN | SYSTOLIC BLOOD PRESSURE: 124 MMHG | HEART RATE: 78 BPM

## 2024-10-14 DIAGNOSIS — E60 ZINC DEFICIENCY: ICD-10-CM

## 2024-10-14 DIAGNOSIS — Z00.00 ROUTINE GENERAL MEDICAL EXAMINATION AT A HEALTH CARE FACILITY: Primary | ICD-10-CM

## 2024-10-14 DIAGNOSIS — N63.0 BREAST MASS IN FEMALE: ICD-10-CM

## 2024-10-14 LAB
METHYLMALONATE SERPL-SCNC: NORMAL NMOL/L
VIT B12 SERPL-MCNC: 346 PG/ML (ref 232–1245)

## 2024-10-14 PROCEDURE — 99396 PREV VISIT EST AGE 40-64: CPT | Performed by: NURSE PRACTITIONER

## 2024-10-14 NOTE — PROGRESS NOTES
Subjective     Chief Complaint   Patient presents with    Annual Exam       History of Present Illness  The patient is a 38-year-old female who presents for evaluation of multiple medical concerns.    She underwent a Pap smear on 08/26/2020, performed by Dr. Cecil Moody at [facility] in [place], [place]. She has not received the results yet. A biopsy was conducted due to the presence of cysts, which were aspirated on the left side. An ultrasound is scheduled for 01/2025 to monitor any changes in the size of the cysts on the right side.    She experiences shortness of breath and chest tightness, which she attributes to anxiety. These symptoms occur every five months and last for a week. She has been managing these episodes with Xanax but has noticed that her shortness of breath worsens the day after taking the medication. She has been under significant stress for the past year and a half, even though she has had fewer responsibilities since graduating school in 05/2024. She has a history of stress and anxiety, which has worsened since the COVID-19 pandemic. She takes zinc daily and uses an inhaler as needed. She stopped taking Zoloft due to an allergic reaction and has been using Zofran instead. She has tried acupuncture, which she believes helped lower her heart rate. She also practices self-care techniques such as massages, Epsom salt baths, and aromatherapy with essential oils.    She has had COVID-19 four times.    FAMILY HISTORY  Her biological father had a heart attack.    Otherwise complete ROS reviewed and negative except as mentioned in the HPI.    Past Medical History:   Past Medical History:   Diagnosis Date    Abnormal ECG     Allergic 2005    Anxiety 2010    Arrhythmia     Asthma, extrinsic     Cervical dystonia 11/30/2022    Gastroparesis 12/23/2021    GERD (gastroesophageal reflux disease)     Globus sensation 6/15/2021    Headache 2002    Intractable nausea and vomiting  6/15/2021    Mild intermittent asthma without complication 6/15/2021    Tachycardia     Vocal cord dysfunction 5/4/2020     Past Surgical History:  Past Surgical History:   Procedure Laterality Date    ADENOIDECTOMY      APPENDECTOMY      CARDIAC SURGERY      loop recorder    CHOLECYSTECTOMY      COLONOSCOPY  2015    ENDOMETRIAL ABLATION      ENDOSCOPY N/A 7/26/2021    Procedure: ESOPHAGOGASTRODUODENOSCOPY WITH ANESTHESIA;  Surgeon: Keegan Pope DO;  Location: [facility] ENDOSCOPY;  Service: Gastroenterology;  Laterality: N/A;  pre GERD  post  jesusita BLOCK    HYSTERECTOMY      SUBTOTAL HYSTERECTOMY  2016    TONSILLECTOMY      US GUIDED CYST ASPIRATION BREAST N/A 8/8/2024     Social History:  reports that she has never smoked. She has never been exposed to tobacco smoke. She has never used smokeless tobacco. She reports that she does not currently use alcohol. She reports that she does not use drugs.    Family History: family history includes COPD in her maternal grandmother; Cancer in her maternal grandfather; Heart disease in her maternal grandmother; No Known Problems in her father and mother; Stroke in her maternal grandfather. She was adopted.       Allergies:  Allergies   Allergen Reactions    Iodinated Contrast Media Anaphylaxis    Nortriptyline Hcl Rash    Prochlorperazine Palpitations and Shortness Of Breath    Sulfa Antibiotics Angioedema, Other (See Comments), Rash and Unknown - Low Severity     breathing difficulty    Other reaction(s): Angioedema, Other (See Comments)     Medications:  Prior to Admission medications    Medication Sig Start Date End Date Taking? Authorizing Provider   albuterol (PROVENTIL) (2.5 MG/3ML) 0.083% nebulizer solution INHALE 3 ML PO Q 4 - 6 H PRM   Yes Provider, MD Albaro   albuterol sulfate  (90 Base) MCG/ACT inhaler Inhale 2 puffs Every 4 (Four) Hours As Needed for Wheezing. 9/3/21  Yes Jesusita Patterson APRN   ALPRAZolam (XANAX) 0.5 MG tablet Take  1 tablet by mouth 2 (Two) Times a Day As Needed for Anxiety. for anxiety 9/24/24  Yes Jesusita Patterson APRN   loratadine (CLARITIN) 10 MG tablet As Needed.   Yes Albaro Kenyon MD   Multiple Vitamins-Minerals (ZINC PO) Take  by mouth.   Yes Albaro Kenyon MD   ondansetron ODT (ZOFRAN-ODT) 8 MG disintegrating tablet Place 1 tablet on the tongue Every 8 (Eight) Hours As Needed for Nausea or Vomiting. 11/20/23  Yes Jesusita Patterson APRN   Ascorbic Acid (DEANN-C PO) Take  by mouth.  Patient not taking: Reported on 10/14/2024    Albaro Kenyon MD   Cholecalciferol (Vitamin D3) 50 MCG (2000 UT) capsule Take 1 capsule by mouth Daily.  Patient not taking: Reported on 10/14/2024    Albaro Kenyon MD   cholestyramine light 4 g powder Take 1 packet by mouth 2 (Two) Times a Day.  Patient not taking: Reported on 10/14/2024 12/8/23   Jesusita Patterson APRN   cyclobenzaprine (FLEXERIL) 5 MG tablet cyclobenzaprine 5 mg tablet   Take 1 tablet as needed by oral route at bedtime for 30 days.  Patient not taking: Reported on 10/14/2024    Albaro Kenyon MD   dexlansoprazole (Dexilant) 60 MG capsule Take 1 capsule by mouth Daily.  Patient not taking: Reported on 10/14/2024    Albaro Kenyon MD   esomeprazole (nexIUM) 40 MG capsule Take 1 capsule by mouth Daily.  Patient not taking: Reported on 10/14/2024    Albaro Kenyon MD   metoclopramide (Reglan) 5 MG tablet Take 1 tablet by mouth 4 (Four) Times a Day Before Meals & at Bedtime.  Patient not taking: Reported on 10/14/2024 2/12/24   Joey Borja APRN   mupirocin (BACTROBAN) 2 % ointment Apply 1 application topically to the appropriate area as directed 3 (Three) Times a Day.  Patient not taking: Reported on 10/14/2024 12/29/21   Leonarda Schafer DO   sertraline (Zoloft) 25 MG tablet Take 0.5 tablets by mouth Daily.  Patient not taking: Reported on 10/14/2024 9/28/23   Jesusita Patterson APRN  "  triamcinolone (KENALOG) 0.1 % ointment Apply 1 application  topically to the appropriate area as directed 2 (Two) Times a Day.  Patient not taking: Reported on 10/14/2024 8/4/23   Jesusita Patterson APRN       Objective     Vital Signs: /81 (BP Location: Left arm, Patient Position: Sitting, Cuff Size: Adult)   Pulse 78   Temp 98.9 °F (37.2 °C) (Infrared)   Resp 16   Ht 167.6 cm (66\")   Wt 54.9 kg (121 lb)   SpO2 99%   BMI 19.53 kg/m²     Physical Exam    Physical Exam  Vitals reviewed.   Constitutional:       Appearance: She is well-developed.   HENT:      Head: Normocephalic and atraumatic.   Eyes:      Pupils: Pupils are equal, round, and reactive to light.   Neck:      Vascular: No JVD.   Cardiovascular:      Rate and Rhythm: Normal rate and regular rhythm.   Pulmonary:      Effort: Pulmonary effort is normal.   Abdominal:      General: Bowel sounds are normal.      Palpations: Abdomen is soft.   Musculoskeletal:         General: No deformity.      Cervical back: Normal range of motion and neck supple.   Lymphadenopathy:      Cervical: No cervical adenopathy.   Skin:     General: Skin is warm and dry.   Neurological:      Mental Status: She is alert and oriented to person, place, and time.   Psychiatric:         Behavior: Behavior normal.         Thought Content: Thought content normal.         Judgment: Judgment normal.     BMI is within normal parameters. No other follow-up for BMI required.    Results Reviewed:  Glucose   Date Value Ref Range Status   10/01/2024 95 70 - 100 mg/dL Final     BUN   Date Value Ref Range Status   10/01/2024 6 5 - 21 mg/dL Final     Creatinine   Date Value Ref Range Status   10/01/2024 0.68 0.50 - 1.40 mg/dL Final     Sodium   Date Value Ref Range Status   10/01/2024 139 135 - 145 mmol/L Final     Potassium   Date Value Ref Range Status   10/01/2024 4.1 3.5 - 5.3 mmol/L Final     Chloride   Date Value Ref Range Status   10/01/2024 102 98 - 110 mmol/L Final "     CO2   Date Value Ref Range Status   10/01/2024 23.0 (L) 24.0 - 31.0 mmol/L Final     Calcium   Date Value Ref Range Status   10/01/2024 9.4 8.6 - 10.5 mg/dL Final     ALT (SGPT)   Date Value Ref Range Status   10/01/2024 10 0 - 35 U/L Final     AST (SGOT)   Date Value Ref Range Status   10/01/2024 13 7 - 45 U/L Final     WBC   Date Value Ref Range Status   10/01/2024 6.20 3.40 - 10.80 10*3/mm3 Final   05/09/2023 5.1 3.4 - 10.8 x10E3/uL Final   05/24/2018 5.3 4.0 - 10.5 10*3/uL Final     Hematocrit   Date Value Ref Range Status   10/01/2024 42.6 34.0 - 46.6 % Final   05/24/2018 40.0 37.0 - 47.0 % Final     Platelets   Date Value Ref Range Status   10/01/2024 312 140 - 450 10*3/mm3 Final   05/24/2018 295 150 - 450 10*3/uL Final     Total Cholesterol   Date Value Ref Range Status   10/01/2024 141 130 - 200 mg/dL Final     Triglycerides   Date Value Ref Range Status   10/01/2024 58 0 - 149 mg/dL Final     HDL Cholesterol   Date Value Ref Range Status   10/01/2024 70 >=50 mg/dL Final     LDL Cholesterol    Date Value Ref Range Status   10/01/2024 59 0 - 99 mg/dL Final     LDL Chol Calc (NIH)   Date Value Ref Range Status   05/09/2023 68 0 - 99 mg/dL Final     LDL/HDL Ratio   Date Value Ref Range Status   10/01/2024 0.85  Final     Hemoglobin A1C   Date Value Ref Range Status   10/01/2024 5.3 4.8 - 5.9 % Final       Results  Laboratory Studies  CO2 level improved from 17 to 23. Zinc level was abnormal. Iron, ferritin, magnesium, all B vitamins were normal. Cholesterol was normal. Methylmalonic acid was normal.    Imaging  Chest x-ray was normal.      Assessment / Plan     Assessment/Plan:  Diagnoses and all orders for this visit:    1. Routine general medical examination at a health care facility (Primary)    2. Zinc deficiency    3. Breast mass in female  -     US Breast Right Limited; Future      Assessment & Plan  1. Annual Exam  All other tests, including iron, ferritin, magnesium, and B vitamins, are within  normal ranges. Her cholesterol levels are also normal. The only abnormal finding was a zinc deficiency. Her chest x-ray showed no abnormalities. Methylmalonic acid levels are normal. It is unlikely that Xanax is causing her shortness of breath. Her bowel movements have improved since starting zinc supplementation. A consultation with a counselor was recommended to help manage her panic attacks.    2. Zinc deficiency.  Zinc levels were found to be abnormal. She is currently taking zinc supplements daily.    3. Shortness of breath.  Episodes of shortness of breath have been reported, occurring periodically and lasting for a few days. These episodes heighten her anxiety. A chest x-ray showed no abnormalities. She was advised to monitor her symptoms and use her inhaler if needed.    4. Panic attacks.  She experiences panic attacks, particularly during periods of shortness of breath. Xanax has been used during these episodes, but she has not taken it recently. She was advised to take Xanax during a normal state to see if it causes shortness of breath. A consultation with a counselor was recommended to help manage her panic attacks.    5. Health Maintenance.  A mammogram and an ultrasound for the right breast are scheduled for January 2025 to monitor the cyst.    Follow-up  Return in 6 months for follow up.      No follow-ups on file. unless patient needs to be seen sooner or acute issues arise.    Code Status: Full.   Patient or patient representative verbalized consent for the use of Ambient Listening during the visit with  MCKAYLA Campos for chart documentation. 10/14/2024  16:08 CDT  I have discussed the patient results/orders and and plan/recommendation with them at today's visit.      Signed by:    MCKAYLA Campos Date: 10/14/24

## 2024-11-27 DIAGNOSIS — R05.9 COUGH: ICD-10-CM

## 2024-11-27 RX ORDER — ALBUTEROL SULFATE 90 UG/1
2 INHALANT RESPIRATORY (INHALATION) EVERY 4 HOURS PRN
Qty: 18 G | Refills: 3 | Status: SHIPPED | OUTPATIENT
Start: 2024-11-27

## 2024-11-27 NOTE — TELEPHONE ENCOUNTER
Rx Refill Note  Requested Prescriptions     Pending Prescriptions Disp Refills    albuterol sulfate  (90 Base) MCG/ACT inhaler 18 g 3     Sig: Inhale 2 puffs Every 4 (Four) Hours As Needed for Wheezing.      Last office visit with prescribing clinician: 10/14/2024   Last telemedicine visit with prescribing clinician: Visit date not found   Next office visit with prescribing clinician: 1/20/2025                         Would you like a call back once the refill request has been completed: [] Yes [] No    If the office needs to give you a call back, can they leave a voicemail: [] Yes [] No    Rosemarie Borja RN  11/27/24, 10:41 CST

## 2025-01-13 ENCOUNTER — HOSPITAL ENCOUNTER (OUTPATIENT)
Dept: ULTRASOUND IMAGING | Facility: HOSPITAL | Age: 40
Discharge: HOME | End: 2025-01-13
Admitting: NURSE PRACTITIONER
Payer: COMMERCIAL

## 2025-01-13 DIAGNOSIS — N63.0 BREAST MASS IN FEMALE: ICD-10-CM

## 2025-01-13 PROCEDURE — 76642 ULTRASOUND BREAST LIMITED: CPT

## 2025-01-20 ENCOUNTER — OFFICE VISIT (OUTPATIENT)
Dept: INTERNAL MEDICINE | Facility: CLINIC | Age: 40
End: 2025-01-20
Payer: COMMERCIAL

## 2025-01-20 VITALS
WEIGHT: 118 LBS | DIASTOLIC BLOOD PRESSURE: 77 MMHG | TEMPERATURE: 98.9 F | SYSTOLIC BLOOD PRESSURE: 116 MMHG | OXYGEN SATURATION: 99 % | HEART RATE: 88 BPM | HEIGHT: 66 IN | BODY MASS INDEX: 18.96 KG/M2

## 2025-01-20 DIAGNOSIS — B37.0 ORAL CANDIDIASIS: ICD-10-CM

## 2025-01-20 DIAGNOSIS — R10.10 PAIN OF UPPER ABDOMEN: ICD-10-CM

## 2025-01-20 DIAGNOSIS — F41.9 ANXIETY: Primary | ICD-10-CM

## 2025-01-20 PROCEDURE — 99214 OFFICE O/P EST MOD 30 MIN: CPT | Performed by: NURSE PRACTITIONER

## 2025-01-20 RX ORDER — NYSTATIN 100000 [USP'U]/ML
500000 SUSPENSION ORAL 4 TIMES DAILY
Qty: 240 ML | Refills: 1 | Status: SHIPPED | OUTPATIENT
Start: 2025-01-20

## 2025-01-20 RX ORDER — MULTIVIT WITH MINERALS/LUTEIN
250 TABLET ORAL DAILY
COMMUNITY

## 2025-01-20 RX ORDER — MULTIPLE VITAMINS W/ MINERALS TAB 9MG-400MCG
1 TAB ORAL DAILY
COMMUNITY

## 2025-01-20 NOTE — PROGRESS NOTES
Subjective     Chief Complaint   Patient presents with    Breast Mass     Follow up       History of Present Illness  The patient is a 39-year-old female who presents for evaluation of anxiety, suspected thrush, and gastrointestinal discomfort.    She has been experiencing intermittent episodes of gagging since Thursday, which she describes as a sensation in her esophagus rather than nausea. She reports that these episodes are not associated with any other symptoms of illness. She has been using Zofran to manage these symptoms, which provides temporary relief. Despite the gagging episodes, she continues to engage in activities such as painting. She reports no recent illness in her household contacts. She also mentions that she has been unable to eat when her stomach is upset but has noticed a pattern of waxing and waning symptoms. She recalls consuming Mexican food on Thursday, which may have contributed to her current symptoms.    She has been under significant stress due to her 's health issues, specifically bilateral avascular necrosis. She has been using Xanax sparingly, with the last dose taken in September 2024. She has been attempting to manage her anxiety through non-pharmacological means, including the use of essential oils, massage therapy, and detox baths. She acknowledges a tendency towards overthinking and expresses a desire to seek psychiatric care, preferably from a provider outside her local area.    Supplemental Information  She is scheduled for a follow-up mammogram in 6 months.    MEDICATIONS  Current: Xanax, Zofran    IMMUNIZATIONS  She received an influenza vaccine at work on 11/06/2024.          Otherwise complete ROS reviewed and negative except as mentioned in the HPI.    Past Medical History:   Past Medical History:   Diagnosis Date    Abnormal ECG     Allergic 2005    Anxiety 2010    Arrhythmia     Asthma, extrinsic     Cervical dystonia 11/30/2022    Gastroparesis 12/23/2021     GERD (gastroesophageal reflux disease)     Globus sensation 6/15/2021    Headache 2002    Intractable nausea and vomiting 6/15/2021    Mild intermittent asthma without complication 6/15/2021    Tachycardia     Vocal cord dysfunction 5/4/2020     Past Surgical History:  Past Surgical History:   Procedure Laterality Date    ADENOIDECTOMY      APPENDECTOMY      CARDIAC SURGERY      loop recorder    CHOLECYSTECTOMY      COLONOSCOPY  2015    ENDOMETRIAL ABLATION      ENDOSCOPY N/A 7/26/2021    Procedure: ESOPHAGOGASTRODUODENOSCOPY WITH ANESTHESIA;  Surgeon: Keegan Pope DO;  Location: [facility] ENDOSCOPY;  Service: Gastroenterology;  Laterality: N/A;  pre GERD  post  jesusita BLOCK    HYSTERECTOMY      SUBTOTAL HYSTERECTOMY  2016    TONSILLECTOMY      US GUIDED CYST ASPIRATION BREAST N/A 8/8/2024     Social History:  reports that she has never smoked. She has never been exposed to tobacco smoke. She has never used smokeless tobacco. She reports that she does not currently use alcohol. She reports that she does not use drugs.    Family History: family history includes COPD in her maternal grandmother; Cancer in her maternal grandfather; Heart disease in her maternal grandmother; No Known Problems in her father and mother; Stroke in her maternal grandfather. She was adopted.       Allergies:  Allergies   Allergen Reactions    Iodinated Contrast Media Anaphylaxis    Nortriptyline Hcl Rash    Prochlorperazine Palpitations and Shortness Of Breath    Sulfa Antibiotics Angioedema, Other (See Comments), Rash and Unknown - Low Severity     breathing difficulty    Other reaction(s): Angioedema, Other (See Comments)     Medications:  Prior to Admission medications    Medication Sig Start Date End Date Taking? Authorizing Provider   ALPRAZolam (XANAX) 0.5 MG tablet Take 1 tablet by mouth 2 (Two) Times a Day As Needed for Anxiety. for anxiety 9/24/24  Yes Jesusita Patterson APRN   Multiple Vitamins-Minerals (ZINC  "PO) Take  by mouth.   Yes ProviderAlbaro MD   multivitamin with minerals tablet tablet Take 1 tablet by mouth Daily.   Yes Albaro Kenyon MD   ondansetron ODT (ZOFRAN-ODT) 8 MG disintegrating tablet Place 1 tablet on the tongue Every 8 (Eight) Hours As Needed for Nausea or Vomiting. 11/20/23  Yes Jesusita Patterson APRN   vitamin C (ASCORBIC ACID) 250 MG tablet Take 1 tablet by mouth Daily.   Yes Albaro Kenyon MD   albuterol (PROVENTIL) (2.5 MG/3ML) 0.083% nebulizer solution INHALE 3 ML PO Q 4 - 6 H PRM  Patient not taking: Reported on 1/20/2025    Albaro Kenyon MD   albuterol sulfate  (90 Base) MCG/ACT inhaler Inhale 2 puffs Every 4 (Four) Hours As Needed for Wheezing. 11/27/24   Jesusita Patterson APRN   cholestyramine light 4 g powder Take 1 packet by mouth 2 (Two) Times a Day.  Patient not taking: Reported on 1/20/2025 12/8/23   Jesusita Patterson APRN       Objective     Vital Signs: /77 (BP Location: Left arm, Patient Position: Sitting, Cuff Size: Adult)   Pulse 88   Temp 98.9 °F (37.2 °C) (Temporal)   Ht 167.6 cm (65.98\")   Wt 53.5 kg (118 lb)   SpO2 99%   BMI 19.05 kg/m²     Physical Exam  Oral exam revealed mild thrush.  Physical Exam  Vitals reviewed.   Constitutional:       Appearance: Normal appearance. She is well-developed.   HENT:      Head: Normocephalic and atraumatic.   Eyes:      Pupils: Pupils are equal, round, and reactive to light.   Neck:      Vascular: No JVD.   Cardiovascular:      Rate and Rhythm: Normal rate and regular rhythm.   Pulmonary:      Effort: Pulmonary effort is normal.      Breath sounds: Normal breath sounds.   Abdominal:      General: Bowel sounds are normal.      Palpations: Abdomen is soft.   Musculoskeletal:         General: No deformity.      Cervical back: Normal range of motion and neck supple.   Lymphadenopathy:      Cervical: No cervical adenopathy.   Skin:     General: Skin is warm and dry. "   Neurological:      General: No focal deficit present.      Mental Status: She is alert and oriented to person, place, and time.   Psychiatric:         Behavior: Behavior normal.         Thought Content: Thought content normal.         Judgment: Judgment normal.       BMI is within normal parameters. No other follow-up for BMI required.    Results Reviewed:  Glucose   Date Value Ref Range Status   10/01/2024 95 70 - 100 mg/dL Final     BUN   Date Value Ref Range Status   10/01/2024 6 5 - 21 mg/dL Final     Creatinine   Date Value Ref Range Status   10/01/2024 0.68 0.50 - 1.40 mg/dL Final     Sodium   Date Value Ref Range Status   10/01/2024 139 135 - 145 mmol/L Final     Potassium   Date Value Ref Range Status   10/01/2024 4.1 3.5 - 5.3 mmol/L Final     Chloride   Date Value Ref Range Status   10/01/2024 102 98 - 110 mmol/L Final     CO2   Date Value Ref Range Status   10/01/2024 23.0 (L) 24.0 - 31.0 mmol/L Final     Calcium   Date Value Ref Range Status   10/01/2024 9.4 8.6 - 10.5 mg/dL Final     ALT (SGPT)   Date Value Ref Range Status   10/01/2024 10 0 - 35 U/L Final     AST (SGOT)   Date Value Ref Range Status   10/01/2024 13 7 - 45 U/L Final     WBC   Date Value Ref Range Status   10/01/2024 6.20 3.40 - 10.80 10*3/mm3 Final   05/09/2023 5.1 3.4 - 10.8 x10E3/uL Final   05/24/2018 5.3 4.0 - 10.5 10*3/uL Final     Hematocrit   Date Value Ref Range Status   10/01/2024 42.6 34.0 - 46.6 % Final   05/24/2018 40.0 37.0 - 47.0 % Final     Platelets   Date Value Ref Range Status   10/01/2024 312 140 - 450 10*3/mm3 Final   05/24/2018 295 150 - 450 10*3/uL Final     Total Cholesterol   Date Value Ref Range Status   10/01/2024 141 130 - 200 mg/dL Final     Triglycerides   Date Value Ref Range Status   10/01/2024 58 0 - 149 mg/dL Final     HDL Cholesterol   Date Value Ref Range Status   10/01/2024 70 >=50 mg/dL Final     LDL Cholesterol    Date Value Ref Range Status   10/01/2024 59 0 - 99 mg/dL Final     LDL Chol Calc  ([facility])   Date Value Ref Range Status   05/09/2023 68 0 - 99 mg/dL Final     LDL/HDL Ratio   Date Value Ref Range Status   10/01/2024 0.85  Final     Hemoglobin A1C   Date Value Ref Range Status   10/01/2024 5.3 4.8 - 5.9 % Final       Results        Assessment / Plan     Assessment/Plan:  Diagnoses and all orders for this visit:    1. Anxiety (Primary)  -     ToxASSURE Select 13 (MW) - Urine, Clean Catch    2. Pain of upper abdomen    3. Oral candidiasis  -     nystatin (MYCOSTATIN) 100,000 unit/mL suspension; Swish and swallow 5 mL 4 (Four) Times a Day.  Dispense: 240 mL; Refill: 1      Assessment & Plan  1. Anxiety.  She has been managing her anxiety with Xanax, taken infrequently. The last prescription was filled in September 2024, and she still has some left. She is also using essential oils, massage therapy, and detox baths to manage her symptoms. She is advised to continue these non-pharmacological interventions. A referral to Dr. Borrego for psychiatric evaluation and potential EMDR therapy will be made. She will complete the controlled substance agreement during this visit.    2. Suspected thrush.  She presents with symptoms suggestive of thrush, including a sensation in the esophagus and gag reflex. A prescription for nystatin has been issued to manage this condition.    3. Gastrointestinal discomfort.  She reports intermittent episodes of gagging and a sensation in the esophagus since Thursday. She has been using Zofran, which provides temporary relief.        Return in about 3 months (around 4/20/2025). unless patient needs to be seen sooner or acute issues arise.    Code Status: Full.   Patient or patient representative verbalized consent for the use of Ambient Listening during the visit with  MCKAYLA Campos for chart documentation. 1/20/2025  11:08 CST  I have discussed the patient results/orders and and plan/recommendation with them at today's visit.      Signed by:    Jesusita Del Rio  MCKAYLA Patterson Date: 01/20/25

## 2025-01-30 LAB — DRUGS UR: NORMAL

## 2025-02-28 ENCOUNTER — OFFICE VISIT (OUTPATIENT)
Dept: INTERNAL MEDICINE | Facility: CLINIC | Age: 40
End: 2025-02-28
Payer: COMMERCIAL

## 2025-02-28 VITALS
WEIGHT: 114 LBS | RESPIRATION RATE: 20 BRPM | BODY MASS INDEX: 18.32 KG/M2 | SYSTOLIC BLOOD PRESSURE: 109 MMHG | OXYGEN SATURATION: 100 % | DIASTOLIC BLOOD PRESSURE: 75 MMHG | HEIGHT: 66 IN | TEMPERATURE: 98.6 F | HEART RATE: 87 BPM

## 2025-02-28 DIAGNOSIS — R20.2 NUMBNESS AND TINGLING OF RIGHT LOWER EXTREMITY: Primary | ICD-10-CM

## 2025-02-28 DIAGNOSIS — R20.0 NUMBNESS AND TINGLING OF RIGHT LOWER EXTREMITY: Primary | ICD-10-CM

## 2025-02-28 PROCEDURE — 99213 OFFICE O/P EST LOW 20 MIN: CPT | Performed by: NURSE PRACTITIONER

## 2025-02-28 NOTE — PROGRESS NOTES
Subjective     Chief Complaint   Patient presents with    Numbness    Tingling    Lower Extremity Issue       History of Present Illness  The patient is a 39-year-old female who presents for evaluation of right knee pain.    She reports experiencing a sensation akin to pins and needles in her right knee, which she describes as tingling. This symptom has been present for approximately one week and is more pronounced at night. She first noticed the symptom while wearing joggers with cuffs but has since changed her attire without any improvement. She does not experience any associated back pain or swelling. She also does not report any symptoms of restless leg syndrome such as twitching. She recalls a previous orthopedic consultation due to leg swelling, during which an ultrasound was performed. She has not observed any redness, swelling, fever, or chills. She expresses concern about the possibility of a clot leading to a pulmonary embolism. She has a scheduled massage therapy session tomorrow and has been attempting meditation. She has been advised to limit her use of Facebook.    She acknowledges recent weight loss, which she attributes to stress rather than intentional dieting. She has been consuming protein shakes and is considering incorporating yogurt smoothies into her diet.      Patient's PMR from outside medical facility reviewed and noted.  Otherwise complete ROS reviewed and negative except as mentioned in the HPI.    Past Medical History:   Past Medical History:   Diagnosis Date    Abnormal ECG     Allergic 2005    Anxiety 2010    Arrhythmia     Asthma, extrinsic     Cervical dystonia 11/30/2022    Gastroparesis 12/23/2021    GERD (gastroesophageal reflux disease)     Globus sensation 6/15/2021    Headache 2002    Intractable nausea and vomiting 6/15/2021    Mild intermittent asthma without complication 6/15/2021    Tachycardia     Vocal cord dysfunction 5/4/2020     Past Surgical History:  Past  Surgical History:   Procedure Laterality Date    ADENOIDECTOMY      APPENDECTOMY      CARDIAC SURGERY      loop recorder    CHOLECYSTECTOMY      COLONOSCOPY  2015    ENDOMETRIAL ABLATION      ENDOSCOPY N/A 7/26/2021    Procedure: ESOPHAGOGASTRODUODENOSCOPY WITH ANESTHESIA;  Surgeon: Keegan Pope DO;  Location: [facility] ENDOSCOPY;  Service: Gastroenterology;  Laterality: N/A;  pre GERD  post  jesusita BLOCK    HYSTERECTOMY      SUBTOTAL HYSTERECTOMY  2016    TONSILLECTOMY      US GUIDED CYST ASPIRATION BREAST N/A 8/8/2024     Social History:  reports that she has never smoked. She has never been exposed to tobacco smoke. She has never used smokeless tobacco. She reports that she does not currently use alcohol. She reports that she does not use drugs.    Family History: family history includes COPD in her maternal grandmother; Cancer in her maternal grandfather; Heart disease in her maternal grandmother; No Known Problems in her father and mother; Stroke in her maternal grandfather. She was adopted.       Allergies:  Allergies   Allergen Reactions    Iodinated Contrast Media Anaphylaxis    Nortriptyline Hcl Rash    Prochlorperazine Palpitations and Shortness Of Breath    Sulfa Antibiotics Angioedema, Other (See Comments), Rash and Unknown - Low Severity     breathing difficulty    Other reaction(s): Angioedema, Other (See Comments)     Medications:  Prior to Admission medications    Medication Sig Start Date End Date Taking? Authorizing Provider   albuterol sulfate  (90 Base) MCG/ACT inhaler Inhale 2 puffs Every 4 (Four) Hours As Needed for Wheezing. 11/27/24   Jesusita Patterson APRN   ALPRAZolam (XANAX) 0.5 MG tablet Take 1 tablet by mouth 2 (Two) Times a Day As Needed for Anxiety. for anxiety 9/24/24   Jesusita Patterson APRN   Multiple Vitamins-Minerals (ZINC PO) Take  by mouth.    Provider, MD Albaro   multivitamin with minerals tablet tablet Take 1 tablet by mouth Daily.     "ProviderAlbaro MD   nystatin (MYCOSTATIN) 100,000 unit/mL suspension Swish and swallow 5 mL 4 (Four) Times a Day. 1/20/25   Jesusita Patterson APRN   ondansetron ODT (ZOFRAN-ODT) 8 MG disintegrating tablet Place 1 tablet on the tongue Every 8 (Eight) Hours As Needed for Nausea or Vomiting. 11/20/23   Jesusita Patterson APRN   vitamin C (ASCORBIC ACID) 250 MG tablet Take 1 tablet by mouth Daily.    ProviderAlbaro MD   albuterol (PROVENTIL) (2.5 MG/3ML) 0.083% nebulizer solution INHALE 3 ML PO Q 4 - 6 H PRM  Patient not taking: Reported on 1/20/2025 2/28/25  ProviderAlbaro MD   cholestyramine light 4 g powder Take 1 packet by mouth 2 (Two) Times a Day.  Patient not taking: Reported on 1/20/2025 12/8/23 2/28/25  Jesusita Patterson APRN       Objective     Vital Signs: /75   Pulse 87   Temp 98.6 °F (37 °C)   Resp 20   Ht 167.6 cm (65.98\")   Wt 51.7 kg (114 lb)   SpO2 100%   BMI 18.41 kg/m²     Physical Exam    Physical Exam  Vitals reviewed.   Constitutional:       Appearance: She is well-developed.      Comments: thin   HENT:      Head: Normocephalic and atraumatic.   Eyes:      Pupils: Pupils are equal, round, and reactive to light.   Neck:      Vascular: No JVD.   Cardiovascular:      Rate and Rhythm: Normal rate and regular rhythm.   Pulmonary:      Effort: Pulmonary effort is normal.      Breath sounds: Normal breath sounds.   Abdominal:      General: Bowel sounds are normal.      Palpations: Abdomen is soft.   Musculoskeletal:         General: No swelling, tenderness, deformity or signs of injury.      Cervical back: Normal range of motion and neck supple.   Lymphadenopathy:      Cervical: No cervical adenopathy.   Skin:     General: Skin is warm and dry.   Neurological:      Mental Status: She is alert and oriented to person, place, and time.   Psychiatric:         Behavior: Behavior normal.         Thought Content: Thought content normal.         Judgment: " Judgment normal.           BMI is below normal parameters (malnutrition). Recommendations: encouraged caloric dense foods.       Results Reviewed:  Glucose   Date Value Ref Range Status   10/01/2024 95 70 - 100 mg/dL Final     BUN   Date Value Ref Range Status   10/01/2024 6 5 - 21 mg/dL Final     Creatinine   Date Value Ref Range Status   10/01/2024 0.68 0.50 - 1.40 mg/dL Final     Sodium   Date Value Ref Range Status   10/01/2024 139 135 - 145 mmol/L Final     Potassium   Date Value Ref Range Status   10/01/2024 4.1 3.5 - 5.3 mmol/L Final     Chloride   Date Value Ref Range Status   10/01/2024 102 98 - 110 mmol/L Final     CO2   Date Value Ref Range Status   10/01/2024 23.0 (L) 24.0 - 31.0 mmol/L Final     Calcium   Date Value Ref Range Status   10/01/2024 9.4 8.6 - 10.5 mg/dL Final     ALT (SGPT)   Date Value Ref Range Status   10/01/2024 10 0 - 35 U/L Final     AST (SGOT)   Date Value Ref Range Status   10/01/2024 13 7 - 45 U/L Final     WBC   Date Value Ref Range Status   10/01/2024 6.20 3.40 - 10.80 10*3/mm3 Final   05/09/2023 5.1 3.4 - 10.8 x10E3/uL Final   05/24/2018 5.3 4.0 - 10.5 10*3/uL Final     Hematocrit   Date Value Ref Range Status   10/01/2024 42.6 34.0 - 46.6 % Final   05/24/2018 40.0 37.0 - 47.0 % Final     Platelets   Date Value Ref Range Status   10/01/2024 312 140 - 450 10*3/mm3 Final   05/24/2018 295 150 - 450 10*3/uL Final     Total Cholesterol   Date Value Ref Range Status   10/01/2024 141 130 - 200 mg/dL Final     Triglycerides   Date Value Ref Range Status   10/01/2024 58 0 - 149 mg/dL Final     HDL Cholesterol   Date Value Ref Range Status   10/01/2024 70 >=50 mg/dL Final     LDL Cholesterol    Date Value Ref Range Status   10/01/2024 59 0 - 99 mg/dL Final     LDL Chol Calc (NIH)   Date Value Ref Range Status   05/09/2023 68 0 - 99 mg/dL Final     LDL/HDL Ratio   Date Value Ref Range Status   10/01/2024 0.85  Final     Hemoglobin A1C   Date Value Ref Range Status   10/01/2024 5.3 4.8 -  5.9 % Final       Results        Assessment / Plan     Assessment/Plan:  Diagnoses and all orders for this visit:    1. Numbness and tingling of right lower extremity (Primary)  -     CBC & Differential  -     Comprehensive Metabolic Panel  -     Magnesium  -     TSH  -     D-dimer, Quantitative        Assessment & Plan  1. Right knee pain.  The patient reports a tingling sensation in the right knee, extending down to the calf, persisting for about a week. There is no associated swelling, redness, fever, or chills. The patient recalls a previous episode in 2022 when a cyst was identified and treated. The current symptoms do not suggest a clot. Blood work will be ordered to check thyroid function, magnesium, and calcium levels. An ultrasound will be performed to rule out the presence of a clot.    2. Weight loss.  The patient has been experiencing unintentional weight loss, which may be related to stress. She consumes protein shakes and is considering adding yogurt smoothies to her diet. Blood work will be ordered to check thyroid function, magnesium, and calcium levels to determine if there is an underlying cause for the weight loss.          No follow-ups on file. unless patient needs to be seen sooner or acute issues arise.    Code Status: Full.   Patient or patient representative verbalized consent for the use of Ambient Listening during the visit with  MCKAYLA Campos for chart documentation. 2/28/2025  12:54 CST  I have discussed the patient results/orders and and plan/recommendation with them at today's visit.      Signed by:    MCKAYLA Campos Date: 02/28/25

## 2025-03-01 LAB
ALBUMIN SERPL-MCNC: 4.6 G/DL (ref 3.9–4.9)
ALP SERPL-CCNC: 59 IU/L (ref 44–121)
ALT SERPL-CCNC: 8 IU/L (ref 0–32)
AST SERPL-CCNC: 10 IU/L (ref 0–40)
BASOPHILS # BLD AUTO: 0 X10E3/UL (ref 0–0.2)
BASOPHILS NFR BLD AUTO: 1 %
BILIRUB SERPL-MCNC: 0.8 MG/DL (ref 0–1.2)
BUN SERPL-MCNC: 7 MG/DL (ref 6–24)
BUN/CREAT SERPL: 10 (ref 9–23)
CALCIUM SERPL-MCNC: 9.8 MG/DL (ref 8.7–10.2)
CHLORIDE SERPL-SCNC: 102 MMOL/L (ref 96–106)
CO2 SERPL-SCNC: 21 MMOL/L (ref 20–29)
CREAT SERPL-MCNC: 0.7 MG/DL (ref 0.57–1)
D DIMER PPP FEU-MCNC: 0.28 MG/L FEU (ref 0–0.49)
EGFRCR SERPLBLD CKD-EPI 2021: 111 ML/MIN/1.73
EOSINOPHIL # BLD AUTO: 0 X10E3/UL (ref 0–0.4)
EOSINOPHIL NFR BLD AUTO: 1 %
ERYTHROCYTE [DISTWIDTH] IN BLOOD BY AUTOMATED COUNT: 11.9 % (ref 11.7–15.4)
GLOBULIN SER CALC-MCNC: 2.4 G/DL (ref 1.5–4.5)
GLUCOSE SERPL-MCNC: 86 MG/DL (ref 70–99)
HCT VFR BLD AUTO: 42 % (ref 34–46.6)
HGB BLD-MCNC: 13.9 G/DL (ref 11.1–15.9)
IMM GRANULOCYTES # BLD AUTO: 0 X10E3/UL (ref 0–0.1)
IMM GRANULOCYTES NFR BLD AUTO: 0 %
LYMPHOCYTES # BLD AUTO: 1.1 X10E3/UL (ref 0.7–3.1)
LYMPHOCYTES NFR BLD AUTO: 27 %
MAGNESIUM SERPL-MCNC: 2.2 MG/DL (ref 1.6–2.3)
MCH RBC QN AUTO: 29.4 PG (ref 26.6–33)
MCHC RBC AUTO-ENTMCNC: 33.1 G/DL (ref 31.5–35.7)
MCV RBC AUTO: 89 FL (ref 79–97)
MONOCYTES # BLD AUTO: 0.4 X10E3/UL (ref 0.1–0.9)
MONOCYTES NFR BLD AUTO: 9 %
NEUTROPHILS # BLD AUTO: 2.4 X10E3/UL (ref 1.4–7)
NEUTROPHILS NFR BLD AUTO: 62 %
PLATELET # BLD AUTO: 288 X10E3/UL (ref 150–450)
POTASSIUM SERPL-SCNC: 4.8 MMOL/L (ref 3.5–5.2)
PROT SERPL-MCNC: 7 G/DL (ref 6–8.5)
RBC # BLD AUTO: 4.72 X10E6/UL (ref 3.77–5.28)
SODIUM SERPL-SCNC: 138 MMOL/L (ref 134–144)
TSH SERPL DL<=0.005 MIU/L-ACNC: 1.11 UIU/ML (ref 0.45–4.5)
WBC # BLD AUTO: 3.9 X10E3/UL (ref 3.4–10.8)

## 2025-04-09 ENCOUNTER — PATIENT MESSAGE (OUTPATIENT)
Dept: INTERNAL MEDICINE | Facility: CLINIC | Age: 40
End: 2025-04-09
Payer: COMMERCIAL

## 2025-04-09 NOTE — LETTER
April 10, 2025     Patient: Mago Gamboa   YOB: 1985   Date of Visit: 4/9/2025       To Whom It May Concern:    It is my medical opinion that Mago Gamboa may be excused from work dating Monday 4/7/2025-Wednesday 4/9/2025.            Sincerely,        MCKAYLA Campos    CC: No Recipients

## 2025-04-09 NOTE — LETTER
April 10, 2025     Patient: Mago Gamboa   YOB: 1985   Date of Visit: 4/9/2025       To Whom It May Concern:    It is my medical opinion that Mago Gamboa may be excused from work dating Monday 4/7/2025-Wednesday 4/8/2025.            Sincerely,        MCKAYLA Campos    CC: No Recipients

## 2025-04-21 ENCOUNTER — TELEMEDICINE (OUTPATIENT)
Dept: INTERNAL MEDICINE | Facility: CLINIC | Age: 40
End: 2025-04-21
Payer: COMMERCIAL

## 2025-04-21 DIAGNOSIS — R11.10 RECURRENT VOMITING: ICD-10-CM

## 2025-04-21 DIAGNOSIS — F41.9 ANXIETY: Primary | ICD-10-CM

## 2025-04-21 PROCEDURE — 99213 OFFICE O/P EST LOW 20 MIN: CPT | Performed by: NURSE PRACTITIONER

## 2025-04-21 NOTE — PROGRESS NOTES
Subjective     Chief Complaint   Patient presents with    Anxiety       History of Present Illness  The patient is a 39-year-old female who presents via virtual visit for evaluation of anxiety.    Significant stress and anxiety have been experienced, attributed to marital issues and the challenges associated with raising her child. The , currently residing with her, is a source of additional stress and anxiety. He is looking for somewhere else to go, as his presence exacerbates her anxiety. Xanax has been avoided, even during periods of heightened anxiety.  Non-pharmacological means such as massages, meditation, and maintaining a clean environment at home have been attempted to manage anxiety, although massages have not been possible due to current circumstances.  She has some family stressors with her family her  and her child. She has had nausea and vomiting which is recurrent for her.     Otherwise complete ROS reviewed and negative except as mentioned in the HPI.    Past Medical History:   Past Medical History:   Diagnosis Date    Abnormal ECG     Allergic 2005    Anxiety 2010    Arrhythmia     Asthma, extrinsic     Cervical dystonia 11/30/2022    Gastroparesis 12/23/2021    GERD (gastroesophageal reflux disease)     Globus sensation 6/15/2021    Headache 2002    Intractable nausea and vomiting 6/15/2021    Mild intermittent asthma without complication 6/15/2021    Tachycardia     Vocal cord dysfunction 5/4/2020     Past Surgical History:  Past Surgical History:   Procedure Laterality Date    ADENOIDECTOMY      APPENDECTOMY      CARDIAC SURGERY      loop recorder    CHOLECYSTECTOMY      COLONOSCOPY  2015    ENDOMETRIAL ABLATION      ENDOSCOPY N/A 7/26/2021    Procedure: ESOPHAGOGASTRODUODENOSCOPY WITH ANESTHESIA;  Surgeon: Keegan Pope DO;  Location: [facility] ENDOSCOPY;  Service: Gastroenterology;  Laterality: N/A;  pre GERD  post  jesus varela APRJOSE ALBERTO    HYSTERECTOMY      SUBTOTAL  HYSTERECTOMY  2016    TONSILLECTOMY      US GUIDED CYST ASPIRATION BREAST N/A 8/8/2024     Social History:  reports that she has never smoked. She has never been exposed to tobacco smoke. She has never used smokeless tobacco. She reports that she does not currently use alcohol. She reports that she does not use drugs.    Family History: family history includes COPD in her maternal grandmother; Cancer in her maternal grandfather; Heart disease in her maternal grandmother; No Known Problems in her father and mother; Stroke in her maternal grandfather. She was adopted.       Allergies:  Allergies   Allergen Reactions    Iodinated Contrast Media Anaphylaxis    Nortriptyline Hcl Rash    Prochlorperazine Palpitations and Shortness Of Breath    Sulfa Antibiotics Angioedema, Other (See Comments), Rash and Unknown - Low Severity     breathing difficulty    Other reaction(s): Angioedema, Other (See Comments)     Medications:  Prior to Admission medications    Medication Sig Start Date End Date Taking? Authorizing Provider   albuterol sulfate  (90 Base) MCG/ACT inhaler Inhale 2 puffs Every 4 (Four) Hours As Needed for Wheezing. 11/27/24   Jesusita Patterson APRN   ALPRAZolam (XANAX) 0.5 MG tablet Take 1 tablet by mouth 2 (Two) Times a Day As Needed for Anxiety. for anxiety 9/24/24   Jesusita Patterson APRN   Multiple Vitamins-Minerals (ZINC PO) Take  by mouth.    ProviderAlbaro MD   multivitamin with minerals tablet tablet Take 1 tablet by mouth Daily.    ProviderAlbaro MD   nystatin (MYCOSTATIN) 100,000 unit/mL suspension Swish and swallow 5 mL 4 (Four) Times a Day. 1/20/25   Jesusita Patterson APRN   ondansetron ODT (ZOFRAN-ODT) 8 MG disintegrating tablet Place 1 tablet on the tongue Every 8 (Eight) Hours As Needed for Nausea or Vomiting. 11/20/23   Jesusita Patterson APRN   vitamin C (ASCORBIC ACID) 250 MG tablet Take 1 tablet by mouth Daily.    ProviderAlbaro MD        Objective     Vital Signs: There were no vitals taken for this visit.    Physical Exam    Physical Exam   Constitutional: She appears well-developed and well-nourished.   Eyes: Pupils are equal, round, and reactive to light. Conjunctivae and EOM are normal.   Neck: Neck normal appearance.  Pulmonary/Chest: Effort normal.   Neurological: She is alert.   Psychiatric: She has a normal mood and affect.      Results Reviewed:  Glucose   Date Value Ref Range Status   02/28/2025 86 70 - 99 mg/dL Final   10/01/2024 95 70 - 100 mg/dL Final     BUN   Date Value Ref Range Status   02/28/2025 7 6 - 24 mg/dL Final   10/01/2024 6 5 - 21 mg/dL Final     Creatinine   Date Value Ref Range Status   02/28/2025 0.70 0.57 - 1.00 mg/dL Final   10/01/2024 0.68 0.50 - 1.40 mg/dL Final     Sodium   Date Value Ref Range Status   02/28/2025 138 134 - 144 mmol/L Final   10/01/2024 139 135 - 145 mmol/L Final     Potassium   Date Value Ref Range Status   02/28/2025 4.8 3.5 - 5.2 mmol/L Final   10/01/2024 4.1 3.5 - 5.3 mmol/L Final     Chloride   Date Value Ref Range Status   02/28/2025 102 96 - 106 mmol/L Final   10/01/2024 102 98 - 110 mmol/L Final     CO2   Date Value Ref Range Status   10/01/2024 23.0 (L) 24.0 - 31.0 mmol/L Final     Total CO2   Date Value Ref Range Status   02/28/2025 21 20 - 29 mmol/L Final     Calcium   Date Value Ref Range Status   02/28/2025 9.8 8.7 - 10.2 mg/dL Final   10/01/2024 9.4 8.6 - 10.5 mg/dL Final     ALT (SGPT)   Date Value Ref Range Status   02/28/2025 8 0 - 32 IU/L Final   10/01/2024 10 0 - 35 U/L Final     AST (SGOT)   Date Value Ref Range Status   02/28/2025 10 0 - 40 IU/L Final   10/01/2024 13 7 - 45 U/L Final     WBC   Date Value Ref Range Status   02/28/2025 3.9 3.4 - 10.8 x10E3/uL Final   05/24/2018 5.3 4.0 - 10.5 10*3/uL Final     Hematocrit   Date Value Ref Range Status   02/28/2025 42.0 34.0 - 46.6 % Final   10/01/2024 42.6 34.0 - 46.6 % Final   05/24/2018 40.0 37.0 - 47.0 % Final      Platelets   Date Value Ref Range Status   02/28/2025 288 150 - 450 x10E3/uL Final   10/01/2024 312 140 - 450 10*3/mm3 Final   05/24/2018 295 150 - 450 10*3/uL Final     Total Cholesterol   Date Value Ref Range Status   10/01/2024 141 130 - 200 mg/dL Final     Triglycerides   Date Value Ref Range Status   10/01/2024 58 0 - 149 mg/dL Final     HDL Cholesterol   Date Value Ref Range Status   10/01/2024 70 >=50 mg/dL Final     LDL Cholesterol    Date Value Ref Range Status   10/01/2024 59 0 - 99 mg/dL Final     LDL Chol Calc (NIH)   Date Value Ref Range Status   05/09/2023 68 0 - 99 mg/dL Final     LDL/HDL Ratio   Date Value Ref Range Status   10/01/2024 0.85  Final     Hemoglobin A1C   Date Value Ref Range Status   10/01/2024 5.3 4.8 - 5.9 % Final       Results        Assessment / Plan     Assessment/Plan:  Diagnoses and all orders for this visit:    1. Anxiety (Primary)    2. Recurrent vomiting        Assessment & Plan  1. Anxiety.  - Anxiety appears to be exacerbated by familial stressors, including her 's presence at home.  - Weight is stable at approximately 114 lbs, consistent with previous measurements in February.  - Discussed the impact of her 's presence on her anxiety and the challenges of managing her anxiety while her child is undergoing detox.  - Encouraged to continue non-pharmacological interventions such as meditation and keeping busy with cleaning. Advised to consider resuming Xanax as needed for severe anxiety episodes.      No follow-ups on file. unless patient needs to be seen sooner or acute issues arise.    Code Status: Full  Patient or patient representative verbalized consent for the use of Ambient Listening during the visit with  MCKAYLA Campos for chart documentation. 4/21/2025  13:01 CDT  I have discussed the patient results/orders and and plan/recommendation with them at today's visit.      Signed by:    MCKAYLA Campos Date: 04/21/25

## 2025-05-01 DIAGNOSIS — R11.2 INTRACTABLE VOMITING WITH NAUSEA: ICD-10-CM

## 2025-05-01 RX ORDER — ONDANSETRON 8 MG/1
8 TABLET, ORALLY DISINTEGRATING ORAL EVERY 8 HOURS PRN
Qty: 90 TABLET | Refills: 5 | Status: SHIPPED | OUTPATIENT
Start: 2025-05-01

## 2025-05-16 DIAGNOSIS — F41.9 ANXIETY: ICD-10-CM

## 2025-05-16 RX ORDER — ALPRAZOLAM 0.5 MG
0.5 TABLET ORAL 2 TIMES DAILY PRN
Qty: 60 TABLET | Refills: 0 | Status: SHIPPED | OUTPATIENT
Start: 2025-05-16

## 2025-05-19 ENCOUNTER — OFFICE VISIT (OUTPATIENT)
Dept: INTERNAL MEDICINE | Facility: CLINIC | Age: 40
End: 2025-05-19
Payer: COMMERCIAL

## 2025-05-19 VITALS
RESPIRATION RATE: 20 BRPM | TEMPERATURE: 98.4 F | WEIGHT: 121 LBS | SYSTOLIC BLOOD PRESSURE: 125 MMHG | HEIGHT: 66 IN | HEART RATE: 73 BPM | BODY MASS INDEX: 19.44 KG/M2 | DIASTOLIC BLOOD PRESSURE: 79 MMHG | OXYGEN SATURATION: 100 %

## 2025-05-19 DIAGNOSIS — R10.2 PELVIC PAIN: Primary | ICD-10-CM

## 2025-05-19 PROCEDURE — 99213 OFFICE O/P EST LOW 20 MIN: CPT | Performed by: NURSE PRACTITIONER

## 2025-05-19 NOTE — PROGRESS NOTES
Subjective     Chief Complaint   Patient presents with    Abdominal Pain     Ovary pain for 2-3 weeks       History of Present Illness  The patient is a 39-year-old female who presents for evaluation of left-sided abdominal pain.    She has been experiencing intermittent left-sided abdominal pain for the past 2 to 3 weeks, which she suspects may be related to her ovary. The pain intensified last Thursday,On Sunday, she experienced a sensation akin to her internal organs descending into her vagina while bathing in hot water with Epsom salt. She describes the discomfort as similar to menstrual cramps, with associated pressure and discomfort. She reports no dysuria or dyspareunia but notes that sexual intercourse on Saturday seemed to exacerbate the pain. She also reports a white discharge over the weekend but does not endorse any malodorous discharge. She has a history of partial hysterectomy and does not recall any previous diagnosis of ovarian cysts.    PAST SURGICAL HISTORY:  Partial hysterectomy    Otherwise complete ROS reviewed and negative except as mentioned in the HPI.    Past Medical History:   Past Medical History:   Diagnosis Date    Abnormal ECG     Allergic 2005    Anxiety 2010    Arrhythmia     Asthma, extrinsic     Cervical dystonia 11/30/2022    Gastroparesis 12/23/2021    GERD (gastroesophageal reflux disease)     Globus sensation 6/15/2021    Headache 2002    Intractable nausea and vomiting 6/15/2021    Mild intermittent asthma without complication 6/15/2021    Tachycardia     Vocal cord dysfunction 5/4/2020     Past Surgical History:  Past Surgical History:   Procedure Laterality Date    ADENOIDECTOMY      APPENDECTOMY      CARDIAC SURGERY      loop recorder    CHOLECYSTECTOMY      COLONOSCOPY  2015    ENDOMETRIAL ABLATION      ENDOSCOPY N/A 7/26/2021    Procedure: ESOPHAGOGASTRODUODENOSCOPY WITH ANESTHESIA;  Surgeon: Keegan Pope DO;  Location: [facility] ENDOSCOPY;  Service:  Gastroenterology;  Laterality: N/A;  pre GERD  post  jesusita BLOCK    HYSTERECTOMY      SUBTOTAL HYSTERECTOMY  2016    TONSILLECTOMY      US GUIDED CYST ASPIRATION BREAST N/A 8/8/2024     Social History:  reports that she has never smoked. She has never been exposed to tobacco smoke. She has never used smokeless tobacco. She reports that she does not currently use alcohol. She reports that she does not use drugs.    Family History: family history includes COPD in her maternal grandmother; Cancer in her maternal grandfather; Heart disease in her maternal grandmother; No Known Problems in her father and mother; Stroke in her maternal grandfather. She was adopted.       Allergies:  Allergies   Allergen Reactions    Iodinated Contrast Media Anaphylaxis    Nortriptyline Hcl Rash    Prochlorperazine Palpitations and Shortness Of Breath    Sulfa Antibiotics Angioedema, Other (See Comments), Rash and Unknown - Low Severity     breathing difficulty    Other reaction(s): Angioedema, Other (See Comments)     Medications:  Prior to Admission medications    Medication Sig Start Date End Date Taking? Authorizing Provider   albuterol sulfate  (90 Base) MCG/ACT inhaler Inhale 2 puffs Every 4 (Four) Hours As Needed for Wheezing. 11/27/24   Jesusita Patterson APRN   ALPRAZolam (XANAX) 0.5 MG tablet Take 1 tablet by mouth 2 (Two) Times a Day As Needed for Anxiety. for anxiety 5/16/25   Jesusita Patterson APRN   Multiple Vitamins-Minerals (ZINC PO) Take  by mouth.    Provider, MD Albaro   multivitamin with minerals tablet tablet Take 1 tablet by mouth Daily.    Provider, MD Albaro   nystatin (MYCOSTATIN) 100,000 unit/mL suspension Swish and swallow 5 mL 4 (Four) Times a Day. 1/20/25   Jesusita Patterson APRN   ondansetron ODT (ZOFRAN-ODT) 8 MG disintegrating tablet Place 1 tablet on the tongue Every 8 (Eight) Hours As Needed for Nausea or Vomiting. 5/1/25   Jesusita Patterson APRN  "  vitamin C (ASCORBIC ACID) 250 MG tablet Take 1 tablet by mouth Daily.    Provider, MD Albaro       Objective     Vital Signs: /79 (BP Location: Right arm, Patient Position: Sitting, Cuff Size: Adult)   Pulse 73   Temp 98.4 °F (36.9 °C) (Temporal)   Resp 20   Ht 167.6 cm (65.98\")   Wt 54.9 kg (121 lb)   SpO2 100%   BMI 19.54 kg/m²     Physical Exam    Physical Exam  Vitals reviewed.   Constitutional:       Appearance: Normal appearance. She is well-developed.   HENT:      Head: Normocephalic and atraumatic.      Mouth/Throat:      Pharynx: No posterior oropharyngeal erythema.   Eyes:      Pupils: Pupils are equal, round, and reactive to light.   Neck:      Vascular: No JVD.   Cardiovascular:      Rate and Rhythm: Normal rate and regular rhythm.   Pulmonary:      Effort: Pulmonary effort is normal.      Breath sounds: Normal breath sounds.   Abdominal:      General: Bowel sounds are normal.      Palpations: Abdomen is soft.      Tenderness: There is abdominal tenderness (Left lower abdomen, more towards groin).   Musculoskeletal:         General: No deformity.      Cervical back: Normal range of motion and neck supple.   Lymphadenopathy:      Cervical: No cervical adenopathy.   Skin:     General: Skin is warm and dry.   Neurological:      General: No focal deficit present.      Mental Status: She is alert and oriented to person, place, and time. Mental status is at baseline.   Psychiatric:         Behavior: Behavior normal.         Thought Content: Thought content normal.         Judgment: Judgment normal.       BMI is within normal parameters. No other follow-up for BMI required.    Results Reviewed:  Glucose   Date Value Ref Range Status   02/28/2025 86 70 - 99 mg/dL Final   10/01/2024 95 70 - 100 mg/dL Final     BUN   Date Value Ref Range Status   02/28/2025 7 6 - 24 mg/dL Final   10/01/2024 6 5 - 21 mg/dL Final     Creatinine   Date Value Ref Range Status   02/28/2025 0.70 0.57 - 1.00 mg/dL " Final   10/01/2024 0.68 0.50 - 1.40 mg/dL Final     Sodium   Date Value Ref Range Status   02/28/2025 138 134 - 144 mmol/L Final   10/01/2024 139 135 - 145 mmol/L Final     Potassium   Date Value Ref Range Status   02/28/2025 4.8 3.5 - 5.2 mmol/L Final   10/01/2024 4.1 3.5 - 5.3 mmol/L Final     Chloride   Date Value Ref Range Status   02/28/2025 102 96 - 106 mmol/L Final   10/01/2024 102 98 - 110 mmol/L Final     CO2   Date Value Ref Range Status   10/01/2024 23.0 (L) 24.0 - 31.0 mmol/L Final     Total CO2   Date Value Ref Range Status   02/28/2025 21 20 - 29 mmol/L Final     Calcium   Date Value Ref Range Status   02/28/2025 9.8 8.7 - 10.2 mg/dL Final   10/01/2024 9.4 8.6 - 10.5 mg/dL Final     ALT (SGPT)   Date Value Ref Range Status   02/28/2025 8 0 - 32 IU/L Final   10/01/2024 10 0 - 35 U/L Final     AST (SGOT)   Date Value Ref Range Status   02/28/2025 10 0 - 40 IU/L Final   10/01/2024 13 7 - 45 U/L Final     WBC   Date Value Ref Range Status   02/28/2025 3.9 3.4 - 10.8 x10E3/uL Final   05/24/2018 5.3 4.0 - 10.5 10*3/uL Final     Hematocrit   Date Value Ref Range Status   02/28/2025 42.0 34.0 - 46.6 % Final   10/01/2024 42.6 34.0 - 46.6 % Final   05/24/2018 40.0 37.0 - 47.0 % Final     Platelets   Date Value Ref Range Status   02/28/2025 288 150 - 450 x10E3/uL Final   10/01/2024 312 140 - 450 10*3/mm3 Final   05/24/2018 295 150 - 450 10*3/uL Final     Total Cholesterol   Date Value Ref Range Status   10/01/2024 141 130 - 200 mg/dL Final     Triglycerides   Date Value Ref Range Status   10/01/2024 58 0 - 149 mg/dL Final     HDL Cholesterol   Date Value Ref Range Status   10/01/2024 70 >=50 mg/dL Final     LDL Cholesterol    Date Value Ref Range Status   10/01/2024 59 0 - 99 mg/dL Final     LDL Chol Calc ([facility])   Date Value Ref Range Status   05/09/2023 68 0 - 99 mg/dL Final     LDL/HDL Ratio   Date Value Ref Range Status   10/01/2024 0.85  Final     Hemoglobin A1C   Date Value Ref Range Status   10/01/2024 5.3  4.8 - 5.9 % Final       Results        Assessment / Plan     Assessment/Plan:  Diagnoses and all orders for this visit:    1. Pelvic pain (Primary)  -     Cancel: US Pelvis Complete      Assessment & Plan  1. Left-sided abdominal pain.  - Reports experiencing off-and-on left-sided abdominal pain for the past 2 to 3 weeks.  - Pain intensified last Thursday and again on Saturday, with a sensation on Sunday that her insides were going to fall out.  - History of partial hysterectomy; no abnormal discharge, burning during urination, or pain during sex, although pain worsened after sexual activity on Saturday.  - Pelvic ultrasound will be conducted to further investigate the cause of the pain. If inconclusive, a transvaginal ultrasound may be performed.      No follow-ups on file. unless patient needs to be seen sooner or acute issues arise.    Code Status: Full    Patient or patient representative verbalized consent for the use of Ambient Listening during the visit with  MCKAYLA Campos for chart documentation. 5/20/2025  14:54 CDT    I have discussed the patient results/orders and and plan/recommendation with them at today's visit.      Signed by:    MCKAYLA Campos Date: 05/20/25

## 2025-05-20 DIAGNOSIS — R10.2 PELVIC PAIN: Primary | ICD-10-CM

## 2025-05-21 ENCOUNTER — HOSPITAL ENCOUNTER (OUTPATIENT)
Dept: CT IMAGING | Facility: HOSPITAL | Age: 40
Discharge: HOME | End: 2025-05-21
Admitting: NURSE PRACTITIONER
Payer: COMMERCIAL

## 2025-05-21 DIAGNOSIS — R10.2 PELVIC PAIN: ICD-10-CM

## 2025-05-21 PROCEDURE — 74176 CT ABD & PELVIS W/O CONTRAST: CPT

## 2025-07-10 ENCOUNTER — TRANSCRIBE ORDERS (OUTPATIENT)
Dept: ADMINISTRATIVE | Facility: HOSPITAL | Age: 40
End: 2025-07-10
Payer: COMMERCIAL

## 2025-07-10 DIAGNOSIS — Z12.31 ENCOUNTER FOR SCREENING MAMMOGRAM FOR BREAST CANCER: Primary | ICD-10-CM

## 2025-07-22 ENCOUNTER — OFFICE VISIT (OUTPATIENT)
Dept: INTERNAL MEDICINE | Facility: CLINIC | Age: 40
End: 2025-07-22
Payer: COMMERCIAL

## 2025-07-22 VITALS
SYSTOLIC BLOOD PRESSURE: 112 MMHG | BODY MASS INDEX: 19.29 KG/M2 | TEMPERATURE: 99.3 F | HEART RATE: 123 BPM | HEIGHT: 66 IN | OXYGEN SATURATION: 99 % | DIASTOLIC BLOOD PRESSURE: 77 MMHG | WEIGHT: 120 LBS

## 2025-07-22 DIAGNOSIS — Z23 NEED FOR TETANUS, DIPHTHERIA, AND ACELLULAR PERTUSSIS (TDAP) VACCINE: ICD-10-CM

## 2025-07-22 DIAGNOSIS — F41.9 ANXIETY: Primary | ICD-10-CM

## 2025-07-22 DIAGNOSIS — Z79.899 LONG-TERM USE OF HIGH-RISK MEDICATION: ICD-10-CM

## 2025-07-22 PROCEDURE — 90471 IMMUNIZATION ADMIN: CPT | Performed by: NURSE PRACTITIONER

## 2025-07-22 PROCEDURE — 99214 OFFICE O/P EST MOD 30 MIN: CPT | Performed by: NURSE PRACTITIONER

## 2025-07-22 PROCEDURE — 90715 TDAP VACCINE 7 YRS/> IM: CPT | Performed by: NURSE PRACTITIONER

## 2025-07-22 NOTE — PROGRESS NOTES
Subjective     Chief Complaint   Patient presents with   • Anxiety     3 month F/U for medication check       History of Present Illness  The patient is a 39-year-old female who presents for a Tdap vaccine today.    She has been experiencing anxiety related to the upcoming Tdap vaccine, which has led her to increase her Xanax intake. She reports feeling nauseous and shaky due to her heightened anxiety. She expresses concern about potential tetanus infection if she does not receive the vaccine and also worries about exposing her grandchild to whooping cough. She has no history of adverse reactions to vaccines. She has been taking Xanax, with an increased dosage in the past few weeks.    She does have a lot of extraneous stressors going on her life.  It is also led to an increase in her Xanax.  She does tell me last week she think she took 3 whole pills total.  She still does not need a refill however.  She is due for urine drug screen today.    Otherwise complete ROS reviewed and negative except as mentioned in the HPI.    Past Medical History:   Past Medical History:   Diagnosis Date   • Abnormal ECG    • Allergic 2005   • Anxiety 2010   • Arrhythmia    • Asthma, extrinsic    • Cervical dystonia 11/30/2022   • Gastroparesis 12/23/2021   • GERD (gastroesophageal reflux disease)    • Globus sensation 6/15/2021   • Headache 2002   • Intractable nausea and vomiting 6/15/2021   • Mild intermittent asthma without complication 6/15/2021   • Tachycardia    • Vocal cord dysfunction 5/4/2020     Past Surgical History:  Past Surgical History:   Procedure Laterality Date   • ADENOIDECTOMY     • APPENDECTOMY     • CARDIAC SURGERY      loop recorder   • CHOLECYSTECTOMY     • COLONOSCOPY  2015   • ENDOMETRIAL ABLATION     • ENDOSCOPY N/A 7/26/2021    Procedure: ESOPHAGOGASTRODUODENOSCOPY WITH ANESTHESIA;  Surgeon: Keegan Pope DO;  Location: [facility] ENDOSCOPY;  Service: Gastroenterology;  Laterality: N/A;  pre  GERD  post  jesusita BLOCK   • HYSTERECTOMY     • SUBTOTAL HYSTERECTOMY  2016   • TONSILLECTOMY     • US GUIDED CYST ASPIRATION BREAST N/A 8/8/2024     Social History:  reports that she has never smoked. She has never been exposed to tobacco smoke. She has never used smokeless tobacco. She reports that she does not currently use alcohol. She reports that she does not use drugs.    Family History: family history includes COPD in her maternal grandmother; Cancer in her maternal grandfather; Heart disease in her maternal grandmother; No Known Problems in her father and mother; Stroke in her maternal grandfather. She was adopted.       Allergies:  Allergies   Allergen Reactions   • Iodinated Contrast Media Anaphylaxis   • Nortriptyline Hcl Rash   • Prochlorperazine Palpitations and Shortness Of Breath   • Sulfa Antibiotics Angioedema, Other (See Comments), Rash and Unknown - Low Severity     breathing difficulty    Other reaction(s): Angioedema, Other (See Comments)     Medications:  Prior to Admission medications    Medication Sig Start Date End Date Taking? Authorizing Provider   albuterol sulfate  (90 Base) MCG/ACT inhaler Inhale 2 puffs Every 4 (Four) Hours As Needed for Wheezing. 11/27/24  Yes Jesusita Patterson APRN   ALPRAZolam (XANAX) 0.5 MG tablet Take 1 tablet by mouth 2 (Two) Times a Day As Needed for Anxiety. for anxiety 5/16/25  Yes Jesusita Patterson APRN   Multiple Vitamins-Minerals (ZINC PO) Take  by mouth.   Yes Albaro Kenyon MD   vitamin C (ASCORBIC ACID) 250 MG tablet Take 1 tablet by mouth Daily.   Yes Albaro Kenyon MD   multivitamin with minerals tablet tablet Take 1 tablet by mouth Daily.  Patient not taking: Reported on 7/22/2025    ProviderAlbaro MD   nystatin (MYCOSTATIN) 100,000 unit/mL suspension Swish and swallow 5 mL 4 (Four) Times a Day.  Patient not taking: Reported on 7/22/2025 1/20/25   Jesusita Patterson APRN   ondansetron ODT  "(ZOFRAN-ODT) 8 MG disintegrating tablet Place 1 tablet on the tongue Every 8 (Eight) Hours As Needed for Nausea or Vomiting.  Patient not taking: Reported on 7/22/2025 5/1/25   Jesusita Patterson APRN       Objective     Vital Signs: /77   Pulse (!) 123   Temp 99.3 °F (37.4 °C)   Ht 167.6 cm (65.98\")   Wt 54.4 kg (120 lb)   SpO2 99%   BMI 19.38 kg/m²     Physical Exam    Physical Exam  Vitals reviewed.   Constitutional:       Appearance: Normal appearance. She is well-developed.   HENT:      Head: Normocephalic and atraumatic.   Eyes:      Pupils: Pupils are equal, round, and reactive to light.   Neck:      Vascular: No JVD.   Cardiovascular:      Rate and Rhythm: Normal rate and regular rhythm.   Pulmonary:      Effort: Pulmonary effort is normal.      Breath sounds: Normal breath sounds.   Abdominal:      General: Bowel sounds are normal.      Palpations: Abdomen is soft.   Musculoskeletal:         General: No deformity.      Cervical back: Normal range of motion and neck supple.   Lymphadenopathy:      Cervical: No cervical adenopathy.   Skin:     General: Skin is warm and dry.   Neurological:      General: No focal deficit present.      Mental Status: She is alert and oriented to person, place, and time.   Psychiatric:         Behavior: Behavior normal.         Thought Content: Thought content normal.         Judgment: Judgment normal.     BMI is within normal parameters. No other follow-up for BMI required.      Results Reviewed:  Glucose   Date Value Ref Range Status   02/28/2025 86 70 - 99 mg/dL Final   10/01/2024 95 70 - 100 mg/dL Final     BUN   Date Value Ref Range Status   02/28/2025 7 6 - 24 mg/dL Final   10/01/2024 6 5 - 21 mg/dL Final     Creatinine   Date Value Ref Range Status   02/28/2025 0.70 0.57 - 1.00 mg/dL Final   10/01/2024 0.68 0.50 - 1.40 mg/dL Final     Sodium   Date Value Ref Range Status   02/28/2025 138 134 - 144 mmol/L Final   10/01/2024 139 135 - 145 mmol/L Final "     Potassium   Date Value Ref Range Status   02/28/2025 4.8 3.5 - 5.2 mmol/L Final   10/01/2024 4.1 3.5 - 5.3 mmol/L Final     Chloride   Date Value Ref Range Status   02/28/2025 102 96 - 106 mmol/L Final   10/01/2024 102 98 - 110 mmol/L Final     CO2   Date Value Ref Range Status   10/01/2024 23.0 (L) 24.0 - 31.0 mmol/L Final     Total CO2   Date Value Ref Range Status   02/28/2025 21 20 - 29 mmol/L Final     Calcium   Date Value Ref Range Status   02/28/2025 9.8 8.7 - 10.2 mg/dL Final   10/01/2024 9.4 8.6 - 10.5 mg/dL Final     ALT (SGPT)   Date Value Ref Range Status   02/28/2025 8 0 - 32 IU/L Final   10/01/2024 10 0 - 35 U/L Final     AST (SGOT)   Date Value Ref Range Status   02/28/2025 10 0 - 40 IU/L Final   10/01/2024 13 7 - 45 U/L Final     WBC   Date Value Ref Range Status   02/28/2025 3.9 3.4 - 10.8 x10E3/uL Final   05/24/2018 5.3 4.0 - 10.5 10*3/uL Final     Hematocrit   Date Value Ref Range Status   02/28/2025 42.0 34.0 - 46.6 % Final   10/01/2024 42.6 34.0 - 46.6 % Final   05/24/2018 40.0 37.0 - 47.0 % Final     Platelets   Date Value Ref Range Status   02/28/2025 288 150 - 450 x10E3/uL Final   10/01/2024 312 140 - 450 10*3/mm3 Final   05/24/2018 295 150 - 450 10*3/uL Final     Total Cholesterol   Date Value Ref Range Status   10/01/2024 141 130 - 200 mg/dL Final     Triglycerides   Date Value Ref Range Status   10/01/2024 58 0 - 149 mg/dL Final     HDL Cholesterol   Date Value Ref Range Status   10/01/2024 70 >=50 mg/dL Final     LDL Cholesterol    Date Value Ref Range Status   10/01/2024 59 0 - 99 mg/dL Final     LDL Chol Calc ([facility])   Date Value Ref Range Status   05/09/2023 68 0 - 99 mg/dL Final     LDL/HDL Ratio   Date Value Ref Range Status   10/01/2024 0.85  Final     Hemoglobin A1C   Date Value Ref Range Status   10/01/2024 5.3 4.8 - 5.9 % Final       Results        Assessment / Plan     Assessment/Plan:  Diagnoses and all orders for this visit:    1. Anxiety (Primary)    2. Need for tetanus,  diphtheria, and acellular pertussis (Tdap) vaccine  -     Tdap Vaccine Greater Than or Equal To 6yo IM    3. Long-term use of high-risk medication  -     ToxASSURE Select 13 (MW) - Urine, Clean Catch        Assessment & Plan  1. Anxiety.  - Reports increased anxiety over the past few weeks, leading to more frequent use of Xanax.  - Taking Xanax as prescribed but more frequently than usual, especially in the last week.  - Experiences significant anxiety related to receiving vaccinations, including nausea and shaking.  - Advised to continue using Xanax as needed but to monitor usage closely. Further evaluation and adjustment of medication regimen may be necessary if anxiety persists or worsens.    2. Health Maintenance.  - Received a Tdap vaccine today to prevent tetanus and whooping cough, especially to protect her grandchild.  - Informed that the next Tdap vaccine would be needed in 10 years unless she sustains a cut or injury that requires an earlier booster.      No follow-ups on file. unless patient needs to be seen sooner or acute issues arise.    Code Status: Full    Patient or patient representative verbalized consent for the use of Ambient Listening during the visit with  MCKAYLA Campos for chart documentation. 7/22/2025  16:27 CDT  I have discussed the patient results/orders and and plan/recommendation with them at today's visit.      Signed by:    MCKAYLA Campos Date: 07/22/25

## 2025-07-22 NOTE — LETTER
[facility]  Vaccine Consent Form    Patient Name:  Mago Gamboa  Patient :  1985     Vaccine(s) Ordered    Tdap Vaccine Greater Than or Equal To 6yo IM        Screening Checklist  The following questions should be completed prior to vaccination. If you answer “yes” to any question, it does not necessarily mean you should not be vaccinated. It just means we may need to clarify or ask more questions. If a question is unclear, please ask your healthcare provider to explain it.    Yes No   Any fever or moderate to severe illness today (mild illness and/or antibiotic treatment are not contraindications)?     Do you have a history of a serious reaction to any previous vaccinations, such as anaphylaxis, encephalopathy within 7 days, Guillain-Nesconset syndrome within 6 weeks, seizure?     Have you received any live vaccine(s) (e.g MMR, MARIOLA) or any other vaccines in the last month (to ensure duplicate doses aren't given)?     Do you have an anaphylactic allergy to latex (DTaP, DTaP-IPV, Hep A, Hep B, MenB, RV, Td, Tdap), baker’s yeast (Hep B, HPV), polysorbates (RSV, nirsevimab, PCV 20 and 21, Rotavirrus, Tdap, Shingrix), or gelatin (MARIOLA, MMR)?     Do you have an anaphylactic allergy to neomycin (Rabies, MARIOLA, MMR, IPV, Hep A), polymyxin B (IPV), or streptomycin (IPV)?      Any cancer, leukemia, AIDS, or other immune system disorder? (MARIOLA, MMR, RV)     Do you have a parent, brother, or sister with an immune system problem (if immune competence of vaccine recipient clinically verified, can proceed)? (MMR, MARIOLA)     Any recent steroid treatments for >2 weeks, chemotherapy, or radiation treatment? (MARIOLA, MMR)     Have you received antibody-containing blood transfusions or IVIG in the past 11 months (recommended interval is dependent on product)? (MMR, MARIOLA)     Have you taken antiviral drugs (acyclovir, famciclovir, valacyclovir for MARIOLA) in the last 24 or 48 hours, respectively?      Are you pregnant or planning to  "become pregnant within 1 month? (MARIOLA, MMR, HPV, IPV, MenB, Abrexvy; For Hep B- refer to Engerix-B; For RSV - Abrysvo is indicated for 32-36 weeks of pregnancy from September to January)     For infants, have you ever been told your child has had intussusception or a medical emergency involving obstruction of the intestine (Rotavirus)? If not for an infant, can skip this question.         *Ordering Physicians/APC should be consulted if \"yes\" is checked by the patient or guardian above.  I have received, read, and understand the Vaccine Information Statement (VIS) for each vaccine ordered.  I have considered my or my child's health status as well as the health status of my close contacts.  I have taken the opportunity to discuss my vaccine questions with my or my child's health care provider.   I have requested that the ordered vaccine(s) be given to me or my child.  I understand the benefits and risks of the vaccines.  I understand that I should remain in the clinic for 15 minutes after receiving the vaccine(s).  _________________________________________________________  Signature of Patient or Parent/Legal Guardian ____________________  Date     "

## 2025-07-30 LAB — DRUGS UR: NORMAL

## 2025-08-07 ENCOUNTER — HOSPITAL ENCOUNTER (OUTPATIENT)
Dept: MAMMOGRAPHY | Facility: HOSPITAL | Age: 40
Discharge: HOME | End: 2025-08-07
Admitting: NURSE PRACTITIONER
Payer: COMMERCIAL

## 2025-08-07 DIAGNOSIS — Z12.31 ENCOUNTER FOR SCREENING MAMMOGRAM FOR BREAST CANCER: ICD-10-CM

## 2025-08-07 LAB
NCCN CRITERIA FLAG: NORMAL
TYRER CUZICK SCORE: 7.9

## 2025-08-07 PROCEDURE — 77063 BREAST TOMOSYNTHESIS BI: CPT

## 2025-08-07 PROCEDURE — 77067 SCR MAMMO BI INCL CAD: CPT

## (undated) DEVICE — FRCP BX RADJAW4 NDL 2.8 240 STD OG

## (undated) DEVICE — CUFF,BP,DISP,1 TUBE,ADULT,HP: Brand: MEDLINE

## (undated) DEVICE — TBG SMPL FLTR LINE NASL 02/C02 A/ BX/100

## (undated) DEVICE — Device: Brand: DEFENDO AIR/WATER/SUCTION AND BIOPSY VALVE

## (undated) DEVICE — CONMED SCOPE SAVER BITE BLOCK, 20X27 MM: Brand: SCOPE SAVER

## (undated) DEVICE — THE CHANNEL CLEANING BRUSH IS A NYLON FLEXI BRUSH ATTACHED TO A FLEXIBLE PLASTIC SHEATH DESIGNED TO SAFELY REMOVE DEBRIS FROM FLEXIBLE ENDOSCOPES.

## (undated) DEVICE — YANKAUER,BULB TIP WITH VENT: Brand: ARGYLE

## (undated) DEVICE — SENSR O2 OXIMAX FNGR A/ 18IN NONSTR